# Patient Record
Sex: MALE | Race: BLACK OR AFRICAN AMERICAN | Employment: FULL TIME | URBAN - METROPOLITAN AREA
[De-identification: names, ages, dates, MRNs, and addresses within clinical notes are randomized per-mention and may not be internally consistent; named-entity substitution may affect disease eponyms.]

---

## 2017-01-05 ENCOUNTER — OFFICE VISIT (OUTPATIENT)
Dept: INTERNAL MEDICINE CLINIC | Age: 52
End: 2017-01-05

## 2017-01-05 VITALS
HEIGHT: 67 IN | WEIGHT: 263.8 LBS | BODY MASS INDEX: 41.4 KG/M2 | DIASTOLIC BLOOD PRESSURE: 82 MMHG | HEART RATE: 98 BPM | SYSTOLIC BLOOD PRESSURE: 132 MMHG | OXYGEN SATURATION: 98 % | TEMPERATURE: 99 F

## 2017-01-05 DIAGNOSIS — G47.9 SLEEP DISORDER: ICD-10-CM

## 2017-01-05 DIAGNOSIS — E11.9 TYPE 2 DIABETES MELLITUS WITHOUT COMPLICATION, WITHOUT LONG-TERM CURRENT USE OF INSULIN (HCC): Primary | ICD-10-CM

## 2017-01-05 DIAGNOSIS — I10 ESSENTIAL HYPERTENSION: ICD-10-CM

## 2017-01-05 DIAGNOSIS — E78.00 PURE HYPERCHOLESTEROLEMIA: ICD-10-CM

## 2017-01-05 LAB
ALBUMIN SERPL-MCNC: 4.3 G/DL (ref 3.4–5)
ANION GAP SERPL CALCULATED.3IONS-SCNC: 15 MMOL/L (ref 3–16)
BILIRUBIN URINE: NEGATIVE
BLOOD, URINE: NEGATIVE
BUN BLDV-MCNC: 13 MG/DL (ref 7–20)
CALCIUM SERPL-MCNC: 9.5 MG/DL (ref 8.3–10.6)
CHLORIDE BLD-SCNC: 98 MMOL/L (ref 99–110)
CLARITY: CLEAR
CO2: 23 MMOL/L (ref 21–32)
COLOR: YELLOW
CREAT SERPL-MCNC: 0.9 MG/DL (ref 0.9–1.3)
GFR AFRICAN AMERICAN: >60
GFR NON-AFRICAN AMERICAN: >60
GLUCOSE BLD-MCNC: 97 MG/DL (ref 70–99)
GLUCOSE URINE: >=1000 MG/DL
KETONES, URINE: NEGATIVE MG/DL
LEUKOCYTE ESTERASE, URINE: NEGATIVE
MICROSCOPIC EXAMINATION: ABNORMAL
NITRITE, URINE: NEGATIVE
PH UA: 6
PHOSPHORUS: 4.5 MG/DL (ref 2.5–4.9)
POTASSIUM SERPL-SCNC: 4.4 MMOL/L (ref 3.5–5.1)
PROTEIN UA: NEGATIVE MG/DL
SODIUM BLD-SCNC: 136 MMOL/L (ref 136–145)
SPECIFIC GRAVITY UA: >1.03
URINE TYPE: ABNORMAL
UROBILINOGEN, URINE: 0.2 E.U./DL

## 2017-01-05 PROCEDURE — 99214 OFFICE O/P EST MOD 30 MIN: CPT | Performed by: INTERNAL MEDICINE

## 2017-01-05 RX ORDER — AMLODIPINE BESYLATE 10 MG/1
10 TABLET ORAL DAILY
Qty: 30 TABLET | Refills: 5 | Status: SHIPPED | OUTPATIENT
Start: 2017-01-05 | End: 2017-09-10 | Stop reason: SDUPTHER

## 2017-01-05 RX ORDER — SIMVASTATIN 20 MG
20 TABLET ORAL EVERY EVENING
Qty: 30 TABLET | Refills: 5 | Status: SHIPPED | OUTPATIENT
Start: 2017-01-05 | End: 2020-04-09 | Stop reason: CLARIF

## 2017-01-05 RX ORDER — LOSARTAN POTASSIUM 100 MG/1
100 TABLET ORAL DAILY
Qty: 30 TABLET | Refills: 5 | Status: SHIPPED | OUTPATIENT
Start: 2017-01-05 | End: 2017-09-23 | Stop reason: SDUPTHER

## 2017-01-05 ASSESSMENT — ENCOUNTER SYMPTOMS
RESPIRATORY NEGATIVE: 1
GASTROINTESTINAL NEGATIVE: 1
EYES NEGATIVE: 1

## 2017-01-06 LAB
ESTIMATED AVERAGE GLUCOSE: 188.6 MG/DL
HBA1C MFR BLD: 8.2 %

## 2017-02-28 RX ORDER — CITALOPRAM 20 MG/1
TABLET ORAL
Qty: 30 TABLET | Refills: 2 | Status: SHIPPED | OUTPATIENT
Start: 2017-02-28 | End: 2021-01-08

## 2017-03-06 ENCOUNTER — OFFICE VISIT (OUTPATIENT)
Dept: INTERNAL MEDICINE CLINIC | Age: 52
End: 2017-03-06

## 2017-03-06 VITALS
BODY MASS INDEX: 42.38 KG/M2 | TEMPERATURE: 98.7 F | SYSTOLIC BLOOD PRESSURE: 112 MMHG | WEIGHT: 270 LBS | HEART RATE: 93 BPM | OXYGEN SATURATION: 98 % | HEIGHT: 67 IN | DIASTOLIC BLOOD PRESSURE: 70 MMHG

## 2017-03-06 DIAGNOSIS — E78.00 PURE HYPERCHOLESTEROLEMIA: ICD-10-CM

## 2017-03-06 DIAGNOSIS — I10 ESSENTIAL HYPERTENSION: Primary | ICD-10-CM

## 2017-03-06 DIAGNOSIS — E11.9 TYPE 2 DIABETES MELLITUS WITHOUT COMPLICATION, WITHOUT LONG-TERM CURRENT USE OF INSULIN (HCC): ICD-10-CM

## 2017-03-06 LAB
BILIRUBIN URINE: NEGATIVE
BLOOD, URINE: NEGATIVE
CLARITY: CLEAR
COLOR: YELLOW
GLUCOSE BLD-MCNC: 138 MG/DL
GLUCOSE URINE: >=1000 MG/DL
HBA1C MFR BLD: 7.4 %
KETONES, URINE: NEGATIVE MG/DL
LEUKOCYTE ESTERASE, URINE: NEGATIVE
MICROSCOPIC EXAMINATION: ABNORMAL
NITRITE, URINE: NEGATIVE
PH UA: 6.5
PROTEIN UA: NEGATIVE MG/DL
SPECIFIC GRAVITY UA: >1.03
URINE TYPE: ABNORMAL
UROBILINOGEN, URINE: 0.2 E.U./DL

## 2017-03-06 PROCEDURE — 99214 OFFICE O/P EST MOD 30 MIN: CPT | Performed by: INTERNAL MEDICINE

## 2017-03-06 PROCEDURE — 83036 HEMOGLOBIN GLYCOSYLATED A1C: CPT | Performed by: INTERNAL MEDICINE

## 2017-03-06 PROCEDURE — 82962 GLUCOSE BLOOD TEST: CPT | Performed by: INTERNAL MEDICINE

## 2017-03-06 ASSESSMENT — ENCOUNTER SYMPTOMS
GASTROINTESTINAL NEGATIVE: 1
EYES NEGATIVE: 1
RESPIRATORY NEGATIVE: 1

## 2017-03-07 LAB
A/G RATIO: 1.3 (ref 1.1–2.2)
ALBUMIN SERPL-MCNC: 4.4 G/DL (ref 3.4–5)
ALP BLD-CCNC: 121 U/L (ref 40–129)
ALT SERPL-CCNC: 29 U/L (ref 10–40)
ANION GAP SERPL CALCULATED.3IONS-SCNC: 16 MMOL/L (ref 3–16)
AST SERPL-CCNC: 21 U/L (ref 15–37)
BILIRUB SERPL-MCNC: 0.3 MG/DL (ref 0–1)
BUN BLDV-MCNC: 13 MG/DL (ref 7–20)
CALCIUM SERPL-MCNC: 9.1 MG/DL (ref 8.3–10.6)
CHLORIDE BLD-SCNC: 97 MMOL/L (ref 99–110)
CO2: 23 MMOL/L (ref 21–32)
CREAT SERPL-MCNC: 0.8 MG/DL (ref 0.9–1.3)
ESTIMATED AVERAGE GLUCOSE: 177.2 MG/DL
GFR AFRICAN AMERICAN: >60
GFR NON-AFRICAN AMERICAN: >60
GLOBULIN: 3.5 G/DL
GLUCOSE BLD-MCNC: 128 MG/DL (ref 70–99)
HBA1C MFR BLD: 7.8 %
POTASSIUM SERPL-SCNC: 4.5 MMOL/L (ref 3.5–5.1)
SODIUM BLD-SCNC: 136 MMOL/L (ref 136–145)
TOTAL PROTEIN: 7.9 G/DL (ref 6.4–8.2)

## 2017-05-04 ENCOUNTER — OFFICE VISIT (OUTPATIENT)
Dept: INTERNAL MEDICINE CLINIC | Age: 52
End: 2017-05-04

## 2017-05-04 VITALS
HEIGHT: 68 IN | OXYGEN SATURATION: 98 % | TEMPERATURE: 98.4 F | HEART RATE: 89 BPM | WEIGHT: 267.7 LBS | SYSTOLIC BLOOD PRESSURE: 140 MMHG | BODY MASS INDEX: 40.57 KG/M2 | DIASTOLIC BLOOD PRESSURE: 88 MMHG

## 2017-05-04 DIAGNOSIS — I10 ESSENTIAL HYPERTENSION: Primary | ICD-10-CM

## 2017-05-04 DIAGNOSIS — E11.9 TYPE 2 DIABETES MELLITUS WITHOUT COMPLICATION, WITHOUT LONG-TERM CURRENT USE OF INSULIN (HCC): ICD-10-CM

## 2017-05-04 DIAGNOSIS — E78.00 PURE HYPERCHOLESTEROLEMIA: ICD-10-CM

## 2017-05-04 LAB
BILIRUBIN URINE: NEGATIVE
BLOOD, URINE: NEGATIVE
CLARITY: CLEAR
COLOR: YELLOW
GLUCOSE BLD-MCNC: 108 MG/DL
GLUCOSE URINE: >=1000 MG/DL
KETONES, URINE: NEGATIVE MG/DL
LEUKOCYTE ESTERASE, URINE: NEGATIVE
MICROSCOPIC EXAMINATION: ABNORMAL
NITRITE, URINE: NEGATIVE
PH UA: 6
PROTEIN UA: NEGATIVE MG/DL
SPECIFIC GRAVITY UA: >1.03
URINE TYPE: ABNORMAL
UROBILINOGEN, URINE: 1 E.U./DL

## 2017-05-04 PROCEDURE — 99214 OFFICE O/P EST MOD 30 MIN: CPT | Performed by: INTERNAL MEDICINE

## 2017-05-04 PROCEDURE — 82962 GLUCOSE BLOOD TEST: CPT | Performed by: INTERNAL MEDICINE

## 2017-05-04 ASSESSMENT — PATIENT HEALTH QUESTIONNAIRE - PHQ9
SUM OF ALL RESPONSES TO PHQ9 QUESTIONS 1 & 2: 0
SUM OF ALL RESPONSES TO PHQ QUESTIONS 1-9: 0
1. LITTLE INTEREST OR PLEASURE IN DOING THINGS: 0
2. FEELING DOWN, DEPRESSED OR HOPELESS: 0

## 2017-05-05 LAB
ALBUMIN SERPL-MCNC: 4.6 G/DL (ref 3.4–5)
ANION GAP SERPL CALCULATED.3IONS-SCNC: 15 MMOL/L (ref 3–16)
BUN BLDV-MCNC: 11 MG/DL (ref 7–20)
CALCIUM SERPL-MCNC: 9.2 MG/DL (ref 8.3–10.6)
CHLORIDE BLD-SCNC: 99 MMOL/L (ref 99–110)
CO2: 25 MMOL/L (ref 21–32)
CREAT SERPL-MCNC: 0.9 MG/DL (ref 0.9–1.3)
ESTIMATED AVERAGE GLUCOSE: 180 MG/DL
GFR AFRICAN AMERICAN: >60
GFR NON-AFRICAN AMERICAN: >60
GLUCOSE BLD-MCNC: 80 MG/DL (ref 70–99)
HBA1C MFR BLD: 7.9 %
PHOSPHORUS: 4.1 MG/DL (ref 2.5–4.9)
POTASSIUM SERPL-SCNC: 4.4 MMOL/L (ref 3.5–5.1)
SODIUM BLD-SCNC: 139 MMOL/L (ref 136–145)

## 2017-08-14 ENCOUNTER — OFFICE VISIT (OUTPATIENT)
Dept: INTERNAL MEDICINE CLINIC | Age: 52
End: 2017-08-14

## 2017-08-14 VITALS
BODY MASS INDEX: 38.95 KG/M2 | DIASTOLIC BLOOD PRESSURE: 82 MMHG | HEART RATE: 81 BPM | OXYGEN SATURATION: 98 % | HEIGHT: 68 IN | WEIGHT: 257 LBS | SYSTOLIC BLOOD PRESSURE: 138 MMHG | TEMPERATURE: 98.6 F

## 2017-08-14 DIAGNOSIS — E11.9 TYPE 2 DIABETES MELLITUS WITHOUT COMPLICATION, WITHOUT LONG-TERM CURRENT USE OF INSULIN (HCC): Primary | ICD-10-CM

## 2017-08-14 DIAGNOSIS — J06.9 URTI (ACUTE UPPER RESPIRATORY INFECTION): ICD-10-CM

## 2017-08-14 DIAGNOSIS — I10 ESSENTIAL HYPERTENSION: ICD-10-CM

## 2017-08-14 DIAGNOSIS — E78.00 PURE HYPERCHOLESTEROLEMIA: ICD-10-CM

## 2017-08-14 LAB — GLUCOSE BLD-MCNC: 81 MG/DL

## 2017-08-14 PROCEDURE — 82962 GLUCOSE BLOOD TEST: CPT | Performed by: INTERNAL MEDICINE

## 2017-08-14 PROCEDURE — 99214 OFFICE O/P EST MOD 30 MIN: CPT | Performed by: INTERNAL MEDICINE

## 2017-08-14 RX ORDER — AZITHROMYCIN 250 MG/1
TABLET, FILM COATED ORAL
Qty: 1 PACKET | Refills: 0 | Status: SHIPPED | OUTPATIENT
Start: 2017-08-14 | End: 2017-08-24

## 2017-08-14 ASSESSMENT — PATIENT HEALTH QUESTIONNAIRE - PHQ9
2. FEELING DOWN, DEPRESSED OR HOPELESS: 0
1. LITTLE INTEREST OR PLEASURE IN DOING THINGS: 0
SUM OF ALL RESPONSES TO PHQ9 QUESTIONS 1 & 2: 0
SUM OF ALL RESPONSES TO PHQ QUESTIONS 1-9: 0

## 2017-08-14 ASSESSMENT — ENCOUNTER SYMPTOMS
RESPIRATORY NEGATIVE: 1
EYES NEGATIVE: 1
GASTROINTESTINAL NEGATIVE: 1

## 2017-08-15 LAB
ALBUMIN SERPL-MCNC: 4.6 G/DL (ref 3.4–5)
ANION GAP SERPL CALCULATED.3IONS-SCNC: 17 MMOL/L (ref 3–16)
BILIRUBIN URINE: NEGATIVE
BLOOD, URINE: ABNORMAL
BUN BLDV-MCNC: 10 MG/DL (ref 7–20)
CALCIUM SERPL-MCNC: 9.5 MG/DL (ref 8.3–10.6)
CHLORIDE BLD-SCNC: 97 MMOL/L (ref 99–110)
CLARITY: CLEAR
CO2: 25 MMOL/L (ref 21–32)
COLOR: YELLOW
CREAT SERPL-MCNC: 0.9 MG/DL (ref 0.9–1.3)
EPITHELIAL CELLS, UA: 0 /HPF (ref 0–5)
ESTIMATED AVERAGE GLUCOSE: 139.9 MG/DL
GFR AFRICAN AMERICAN: >60
GFR NON-AFRICAN AMERICAN: >60
GLUCOSE BLD-MCNC: 67 MG/DL (ref 70–99)
GLUCOSE URINE: >=1000 MG/DL
HBA1C MFR BLD: 6.5 %
HYALINE CASTS: 0 /LPF (ref 0–8)
KETONES, URINE: NEGATIVE MG/DL
LEUKOCYTE ESTERASE, URINE: NEGATIVE
MICROSCOPIC EXAMINATION: YES
NITRITE, URINE: NEGATIVE
PH UA: 6
PHOSPHORUS: 4.3 MG/DL (ref 2.5–4.9)
POTASSIUM SERPL-SCNC: 4.4 MMOL/L (ref 3.5–5.1)
PROTEIN UA: NEGATIVE MG/DL
RBC UA: 4 /HPF (ref 0–4)
SODIUM BLD-SCNC: 139 MMOL/L (ref 136–145)
SPECIFIC GRAVITY UA: 1.02
URINE TYPE: ABNORMAL
UROBILINOGEN, URINE: 0.2 E.U./DL
WBC UA: 1 /HPF (ref 0–5)

## 2017-09-13 RX ORDER — AMLODIPINE BESYLATE 10 MG/1
TABLET ORAL
Qty: 30 TABLET | Refills: 5 | Status: SHIPPED | OUTPATIENT
Start: 2017-09-13 | End: 2018-06-10 | Stop reason: SDUPTHER

## 2017-09-25 RX ORDER — LOSARTAN POTASSIUM 100 MG/1
TABLET ORAL
Qty: 30 TABLET | Refills: 5 | Status: SHIPPED | OUTPATIENT
Start: 2017-09-25 | End: 2017-10-20 | Stop reason: SDUPTHER

## 2017-10-17 RX ORDER — CANAGLIFLOZIN AND METFORMIN HYDROCHLORIDE 150; 500 MG/1; MG/1
TABLET, FILM COATED ORAL
Qty: 60 TABLET | Refills: 3 | Status: SHIPPED | OUTPATIENT
Start: 2017-10-17 | End: 2018-02-09 | Stop reason: SDUPTHER

## 2017-10-20 RX ORDER — LOSARTAN POTASSIUM 100 MG/1
TABLET ORAL
Qty: 90 TABLET | Refills: 1 | Status: SHIPPED | OUTPATIENT
Start: 2017-10-20 | End: 2018-07-24

## 2017-11-14 ENCOUNTER — OFFICE VISIT (OUTPATIENT)
Dept: INTERNAL MEDICINE CLINIC | Age: 52
End: 2017-11-14

## 2017-11-14 VITALS
HEART RATE: 96 BPM | HEIGHT: 68 IN | SYSTOLIC BLOOD PRESSURE: 104 MMHG | OXYGEN SATURATION: 97 % | DIASTOLIC BLOOD PRESSURE: 80 MMHG | BODY MASS INDEX: 40.32 KG/M2 | WEIGHT: 266 LBS

## 2017-11-14 DIAGNOSIS — E78.00 PURE HYPERCHOLESTEROLEMIA: ICD-10-CM

## 2017-11-14 DIAGNOSIS — R53.82 CHRONIC FATIGUE: ICD-10-CM

## 2017-11-14 DIAGNOSIS — I10 ESSENTIAL HYPERTENSION: ICD-10-CM

## 2017-11-14 DIAGNOSIS — R40.0 DAYTIME SLEEPINESS: ICD-10-CM

## 2017-11-14 DIAGNOSIS — Z83.3 FAMILY HISTORY OF DIABETES MELLITUS: ICD-10-CM

## 2017-11-14 DIAGNOSIS — E11.9 TYPE 2 DIABETES MELLITUS WITHOUT COMPLICATION, WITHOUT LONG-TERM CURRENT USE OF INSULIN (HCC): Primary | ICD-10-CM

## 2017-11-14 LAB
GLUCOSE BLD-MCNC: 105 MG/DL
HBA1C MFR BLD: 6.9 %

## 2017-11-14 PROCEDURE — 83036 HEMOGLOBIN GLYCOSYLATED A1C: CPT | Performed by: INTERNAL MEDICINE

## 2017-11-14 PROCEDURE — 82962 GLUCOSE BLOOD TEST: CPT | Performed by: INTERNAL MEDICINE

## 2017-11-14 PROCEDURE — 99214 OFFICE O/P EST MOD 30 MIN: CPT | Performed by: INTERNAL MEDICINE

## 2017-11-14 ASSESSMENT — ENCOUNTER SYMPTOMS
GASTROINTESTINAL NEGATIVE: 1
RESPIRATORY NEGATIVE: 1
EYES NEGATIVE: 1

## 2017-11-14 NOTE — PROGRESS NOTES
Subjective:      Patient ID: Rachael Finn is a 47 y.o. male. Here for diabetes, hypertension, hyperlipidemia. Takes meds daily        Review of Systems   Constitutional: Negative. HENT: Negative. Eyes: Negative. Respiratory: Negative. Cardiovascular: Negative. Gastrointestinal: Negative. Genitourinary: Negative. Musculoskeletal: Negative. Skin: Negative. Neurological: Negative. Psychiatric/Behavioral: Negative. Objective:   Physical Exam   Constitutional: He is oriented to person, place, and time. He appears well-developed and well-nourished. HENT:   Head: Normocephalic and atraumatic. Left Ear: External ear normal.   Eyes: Conjunctivae and EOM are normal. Pupils are equal, round, and reactive to light. Neck: Normal range of motion. Neck supple. No thyromegaly present. Cardiovascular: Normal rate, regular rhythm and normal heart sounds. No murmur heard. Pulmonary/Chest: Effort normal and breath sounds normal. No respiratory distress. He has no wheezes. He has no rales. Abdominal: Soft. Bowel sounds are normal.   Musculoskeletal: Normal range of motion. Neurological: He is alert and oriented to person, place, and time. Skin: Skin is warm and dry. Psychiatric: He has a normal mood and affect.        Assessment:     diabetes improved    Hypertension  Stable    Hyperlipidemia  On diet    sleep disorder persists    Daytime sleepiness   Plan:     continue meds    Refills    Needs sleep study    Refills    Return in 2 months

## 2018-01-22 ENCOUNTER — OFFICE VISIT (OUTPATIENT)
Dept: INTERNAL MEDICINE CLINIC | Age: 53
End: 2018-01-22

## 2018-01-22 VITALS
DIASTOLIC BLOOD PRESSURE: 86 MMHG | WEIGHT: 268 LBS | OXYGEN SATURATION: 98 % | HEIGHT: 68 IN | BODY MASS INDEX: 40.62 KG/M2 | SYSTOLIC BLOOD PRESSURE: 120 MMHG | HEART RATE: 90 BPM

## 2018-01-22 DIAGNOSIS — I10 ESSENTIAL HYPERTENSION: ICD-10-CM

## 2018-01-22 DIAGNOSIS — E78.00 PURE HYPERCHOLESTEROLEMIA: ICD-10-CM

## 2018-01-22 DIAGNOSIS — E11.9 TYPE 2 DIABETES MELLITUS WITHOUT COMPLICATION, WITHOUT LONG-TERM CURRENT USE OF INSULIN (HCC): Primary | ICD-10-CM

## 2018-01-22 LAB
ALBUMIN SERPL-MCNC: 4.6 G/DL (ref 3.4–5)
ANION GAP SERPL CALCULATED.3IONS-SCNC: 16 MMOL/L (ref 3–16)
BUN BLDV-MCNC: 14 MG/DL (ref 7–20)
CALCIUM SERPL-MCNC: 9.4 MG/DL (ref 8.3–10.6)
CHLORIDE BLD-SCNC: 98 MMOL/L (ref 99–110)
CO2: 22 MMOL/L (ref 21–32)
CREAT SERPL-MCNC: 0.9 MG/DL (ref 0.9–1.3)
EPITHELIAL CELLS, UA: 0 /HPF (ref 0–5)
GFR AFRICAN AMERICAN: >60
GFR NON-AFRICAN AMERICAN: >60
GLUCOSE BLD-MCNC: 101 MG/DL (ref 70–99)
GLUCOSE BLD-MCNC: 122 MG/DL
HYALINE CASTS: 0 /LPF (ref 0–8)
PHOSPHORUS: 4.2 MG/DL (ref 2.5–4.9)
POTASSIUM SERPL-SCNC: 4.5 MMOL/L (ref 3.5–5.1)
RBC UA: 3 /HPF (ref 0–4)
SODIUM BLD-SCNC: 136 MMOL/L (ref 136–145)
WBC UA: 0 /HPF (ref 0–5)

## 2018-01-22 PROCEDURE — 99214 OFFICE O/P EST MOD 30 MIN: CPT | Performed by: INTERNAL MEDICINE

## 2018-01-22 PROCEDURE — 82962 GLUCOSE BLOOD TEST: CPT | Performed by: INTERNAL MEDICINE

## 2018-01-22 ASSESSMENT — ENCOUNTER SYMPTOMS
GASTROINTESTINAL NEGATIVE: 1
EYES NEGATIVE: 1
RESPIRATORY NEGATIVE: 1

## 2018-01-23 LAB
ESTIMATED AVERAGE GLUCOSE: 168.6 MG/DL
HBA1C MFR BLD: 7.5 %

## 2018-02-13 RX ORDER — CANAGLIFLOZIN AND METFORMIN HYDROCHLORIDE 150; 500 MG/1; MG/1
TABLET, FILM COATED ORAL
Qty: 60 TABLET | Refills: 3 | Status: SHIPPED | OUTPATIENT
Start: 2018-02-13 | End: 2018-04-23 | Stop reason: SDUPTHER

## 2018-03-05 RX ORDER — LOSARTAN POTASSIUM 100 MG/1
TABLET ORAL
Qty: 30 TABLET | Refills: 5 | Status: SHIPPED | OUTPATIENT
Start: 2018-03-05 | End: 2018-09-28 | Stop reason: SDUPTHER

## 2018-04-17 ENCOUNTER — TELEPHONE (OUTPATIENT)
Dept: INTERNAL MEDICINE CLINIC | Age: 53
End: 2018-04-17

## 2018-04-23 ENCOUNTER — OFFICE VISIT (OUTPATIENT)
Dept: INTERNAL MEDICINE CLINIC | Age: 53
End: 2018-04-23

## 2018-04-23 VITALS
OXYGEN SATURATION: 98 % | WEIGHT: 262 LBS | DIASTOLIC BLOOD PRESSURE: 80 MMHG | HEIGHT: 68 IN | HEART RATE: 93 BPM | BODY MASS INDEX: 39.71 KG/M2 | SYSTOLIC BLOOD PRESSURE: 120 MMHG

## 2018-04-23 DIAGNOSIS — E11.9 TYPE 2 DIABETES MELLITUS WITHOUT COMPLICATION, WITHOUT LONG-TERM CURRENT USE OF INSULIN (HCC): Primary | ICD-10-CM

## 2018-04-23 DIAGNOSIS — Z13.220 SCREENING FOR HYPERLIPIDEMIA: ICD-10-CM

## 2018-04-23 DIAGNOSIS — M79.674 PAIN OF TOE OF RIGHT FOOT: ICD-10-CM

## 2018-04-23 DIAGNOSIS — E78.00 PURE HYPERCHOLESTEROLEMIA: ICD-10-CM

## 2018-04-23 DIAGNOSIS — I10 ESSENTIAL HYPERTENSION: ICD-10-CM

## 2018-04-23 LAB — GLUCOSE BLD-MCNC: 94 MG/DL

## 2018-04-23 PROCEDURE — 96372 THER/PROPH/DIAG INJ SC/IM: CPT | Performed by: INTERNAL MEDICINE

## 2018-04-23 PROCEDURE — 99214 OFFICE O/P EST MOD 30 MIN: CPT | Performed by: INTERNAL MEDICINE

## 2018-04-23 PROCEDURE — 82962 GLUCOSE BLOOD TEST: CPT | Performed by: INTERNAL MEDICINE

## 2018-04-23 RX ORDER — GABAPENTIN 300 MG/1
300 CAPSULE ORAL 3 TIMES DAILY
Qty: 90 CAPSULE | Refills: 3 | Status: SHIPPED | OUTPATIENT
Start: 2018-04-23 | End: 2018-06-27 | Stop reason: SDUPTHER

## 2018-04-23 ASSESSMENT — ENCOUNTER SYMPTOMS
EYES NEGATIVE: 1
GASTROINTESTINAL NEGATIVE: 1
RESPIRATORY NEGATIVE: 1

## 2018-04-24 LAB
ALBUMIN SERPL-MCNC: 4.5 G/DL (ref 3.4–5)
ANION GAP SERPL CALCULATED.3IONS-SCNC: 18 MMOL/L (ref 3–16)
BUN BLDV-MCNC: 15 MG/DL (ref 7–20)
CALCIUM SERPL-MCNC: 9.5 MG/DL (ref 8.3–10.6)
CHLORIDE BLD-SCNC: 99 MMOL/L (ref 99–110)
CHOLESTEROL, TOTAL: 216 MG/DL (ref 0–199)
CO2: 22 MMOL/L (ref 21–32)
CREAT SERPL-MCNC: 0.8 MG/DL (ref 0.9–1.3)
CREATININE URINE: 73.5 MG/DL (ref 39–259)
ESTIMATED AVERAGE GLUCOSE: 165.7 MG/DL
GFR AFRICAN AMERICAN: >60
GFR NON-AFRICAN AMERICAN: >60
GLUCOSE BLD-MCNC: 92 MG/DL (ref 70–99)
HBA1C MFR BLD: 7.4 %
HDLC SERPL-MCNC: 46 MG/DL (ref 40–60)
LDL CHOLESTEROL CALCULATED: 151 MG/DL
MICROALBUMIN UR-MCNC: <1.2 MG/DL
MICROALBUMIN/CREAT UR-RTO: NORMAL MG/G (ref 0–30)
PHOSPHORUS: 3.8 MG/DL (ref 2.5–4.9)
POTASSIUM SERPL-SCNC: 4.7 MMOL/L (ref 3.5–5.1)
SODIUM BLD-SCNC: 139 MMOL/L (ref 136–145)
TRIGL SERPL-MCNC: 94 MG/DL (ref 0–150)
URIC ACID, SERUM: 4.4 MG/DL (ref 3.5–7.2)
VLDLC SERPL CALC-MCNC: 19 MG/DL

## 2018-05-24 ENCOUNTER — TELEPHONE (OUTPATIENT)
Dept: INTERNAL MEDICINE CLINIC | Age: 53
End: 2018-05-24

## 2018-06-12 RX ORDER — AMLODIPINE BESYLATE 10 MG/1
TABLET ORAL
Qty: 30 TABLET | Refills: 5 | Status: SHIPPED | OUTPATIENT
Start: 2018-06-12 | End: 2018-12-22 | Stop reason: SDUPTHER

## 2018-06-28 RX ORDER — GABAPENTIN 300 MG/1
300 CAPSULE ORAL 3 TIMES DAILY
Qty: 90 CAPSULE | Refills: 5 | Status: SHIPPED | OUTPATIENT
Start: 2018-06-28 | End: 2018-09-18

## 2018-07-24 ENCOUNTER — OFFICE VISIT (OUTPATIENT)
Dept: INTERNAL MEDICINE CLINIC | Age: 53
End: 2018-07-24

## 2018-07-24 VITALS
DIASTOLIC BLOOD PRESSURE: 80 MMHG | WEIGHT: 260 LBS | HEART RATE: 103 BPM | SYSTOLIC BLOOD PRESSURE: 120 MMHG | BODY MASS INDEX: 39.4 KG/M2 | HEIGHT: 68 IN | OXYGEN SATURATION: 99 %

## 2018-07-24 DIAGNOSIS — E11.9 TYPE 2 DIABETES MELLITUS WITHOUT COMPLICATION, WITHOUT LONG-TERM CURRENT USE OF INSULIN (HCC): Primary | ICD-10-CM

## 2018-07-24 DIAGNOSIS — I10 ESSENTIAL HYPERTENSION: ICD-10-CM

## 2018-07-24 DIAGNOSIS — E78.00 PURE HYPERCHOLESTEROLEMIA: ICD-10-CM

## 2018-07-24 LAB — GLUCOSE BLD-MCNC: 225 MG/DL

## 2018-07-24 PROCEDURE — 99214 OFFICE O/P EST MOD 30 MIN: CPT | Performed by: INTERNAL MEDICINE

## 2018-07-24 PROCEDURE — 82962 GLUCOSE BLOOD TEST: CPT | Performed by: INTERNAL MEDICINE

## 2018-07-24 ASSESSMENT — PATIENT HEALTH QUESTIONNAIRE - PHQ9
SUM OF ALL RESPONSES TO PHQ QUESTIONS 1-9: 0
2. FEELING DOWN, DEPRESSED OR HOPELESS: 0
SUM OF ALL RESPONSES TO PHQ9 QUESTIONS 1 & 2: 0
1. LITTLE INTEREST OR PLEASURE IN DOING THINGS: 0

## 2018-07-24 ASSESSMENT — ENCOUNTER SYMPTOMS
RESPIRATORY NEGATIVE: 1
GASTROINTESTINAL NEGATIVE: 1
EYES NEGATIVE: 1

## 2018-07-24 NOTE — PROGRESS NOTES
Ear: External ear normal.   Eyes: Conjunctivae and EOM are normal. Pupils are equal, round, and reactive to light. Neck: Normal range of motion. Neck supple. No thyromegaly present. Cardiovascular: Normal rate, regular rhythm and normal heart sounds. No murmur heard. Pulmonary/Chest: Effort normal and breath sounds normal. No respiratory distress. He has no wheezes. He has no rales. Abdominal: Soft. Bowel sounds are normal.   Musculoskeletal: Normal range of motion. Neurological: He is alert and oriented to person, place, and time. Skin: Skin is warm and dry. Psychiatric: He has a normal mood and affect. Current Outpatient Prescriptions on File Prior to Visit   Medication Sig Dispense Refill    gabapentin (NEURONTIN) 300 MG capsule Take 1 capsule by mouth 3 times daily for 30 days. . 90 capsule 5    amLODIPine (NORVASC) 10 MG tablet TAKE 1 TABLET BY MOUTH EVERY DAY 30 tablet 5    canagliflozin-metformin HCl (INVOKAMET) 150-500 MG Take 1 tablet by mouth every morning (before breakfast) 60 tablet 3    losartan (COZAAR) 100 MG tablet TAKE 1 TABLET BY MOUTH DAILY 30 tablet 5    canagliflozin-metformin HCl (INVOKAMET) 150-500 MG Take 1 tablet by mouth 2 times daily (with meals) 60 tablet 3    canagliflozin-metformin HCl (INVOKAMET) 150-500 MG Take 1 tablet by mouth 2 times daily (with meals) 60 tablet 3    sitaGLIPtin-metFORMIN (JANUMET XR)  MG TB24 per extended release tablet Take 1 tablet by mouth daily 30 tablet 5    citalopram (CELEXA) 20 MG tablet TAKE 1 TABLET BY MOUTH DAILY 30 tablet 2    simvastatin (ZOCOR) 20 MG tablet Take 1 tablet by mouth every evening 30 tablet 5    canagliflozin-metformin HCl (INVOKAMET) 150-500 MG Take 1 tablet by mouth 2 times daily (with meals) 60 tablet 3     No current facility-administered medications on file prior to visit.         Assessment:      Diabetes  Stable     Hypertension  Stable    Hyperlipidemia  On diet      Plan:      Continue meds

## 2018-09-18 ENCOUNTER — OFFICE VISIT (OUTPATIENT)
Dept: INTERNAL MEDICINE CLINIC | Age: 53
End: 2018-09-18

## 2018-09-18 VITALS
WEIGHT: 261 LBS | DIASTOLIC BLOOD PRESSURE: 74 MMHG | HEART RATE: 101 BPM | OXYGEN SATURATION: 98 % | SYSTOLIC BLOOD PRESSURE: 120 MMHG | BODY MASS INDEX: 39.56 KG/M2 | HEIGHT: 68 IN

## 2018-09-18 DIAGNOSIS — E78.00 PURE HYPERCHOLESTEROLEMIA: ICD-10-CM

## 2018-09-18 DIAGNOSIS — I10 ESSENTIAL HYPERTENSION: ICD-10-CM

## 2018-09-18 DIAGNOSIS — Z12.5 PROSTATE CANCER SCREENING: ICD-10-CM

## 2018-09-18 DIAGNOSIS — E11.9 TYPE 2 DIABETES MELLITUS WITHOUT COMPLICATION, WITHOUT LONG-TERM CURRENT USE OF INSULIN (HCC): Primary | ICD-10-CM

## 2018-09-18 LAB
GLUCOSE BLD-MCNC: 113 MG/DL
HBA1C MFR BLD: 6.8 %

## 2018-09-18 PROCEDURE — 83036 HEMOGLOBIN GLYCOSYLATED A1C: CPT | Performed by: INTERNAL MEDICINE

## 2018-09-18 PROCEDURE — 99214 OFFICE O/P EST MOD 30 MIN: CPT | Performed by: INTERNAL MEDICINE

## 2018-09-18 PROCEDURE — 82962 GLUCOSE BLOOD TEST: CPT | Performed by: INTERNAL MEDICINE

## 2018-09-18 RX ORDER — GABAPENTIN 300 MG/1
300 CAPSULE ORAL NIGHTLY
COMMUNITY
Start: 2018-09-12 | End: 2020-09-29 | Stop reason: ALTCHOICE

## 2018-09-18 ASSESSMENT — PATIENT HEALTH QUESTIONNAIRE - PHQ9
1. LITTLE INTEREST OR PLEASURE IN DOING THINGS: 0
SUM OF ALL RESPONSES TO PHQ QUESTIONS 1-9: 0
SUM OF ALL RESPONSES TO PHQ9 QUESTIONS 1 & 2: 0
2. FEELING DOWN, DEPRESSED OR HOPELESS: 0
SUM OF ALL RESPONSES TO PHQ QUESTIONS 1-9: 0

## 2018-09-25 ASSESSMENT — ENCOUNTER SYMPTOMS
GASTROINTESTINAL NEGATIVE: 1
RESPIRATORY NEGATIVE: 1
EYES NEGATIVE: 1

## 2018-09-28 RX ORDER — LOSARTAN POTASSIUM 100 MG/1
TABLET ORAL
Qty: 30 TABLET | Refills: 5 | Status: SHIPPED | OUTPATIENT
Start: 2018-09-28 | End: 2019-04-10 | Stop reason: SDUPTHER

## 2018-10-10 RX ORDER — CANAGLIFLOZIN AND METFORMIN HYDROCHLORIDE 150; 500 MG/1; MG/1
TABLET, FILM COATED ORAL
Qty: 60 TABLET | Refills: 3 | Status: SHIPPED | OUTPATIENT
Start: 2018-10-10 | End: 2020-04-09 | Stop reason: CLARIF

## 2018-12-26 RX ORDER — AMLODIPINE BESYLATE 10 MG/1
TABLET ORAL
Qty: 30 TABLET | Refills: 5 | Status: SHIPPED | OUTPATIENT
Start: 2018-12-26 | End: 2019-06-20 | Stop reason: SDUPTHER

## 2019-04-10 RX ORDER — LOSARTAN POTASSIUM 100 MG/1
TABLET ORAL
Qty: 30 TABLET | Refills: 5 | Status: SHIPPED | OUTPATIENT
Start: 2019-04-10 | End: 2019-10-12 | Stop reason: SDUPTHER

## 2019-06-20 RX ORDER — AMLODIPINE BESYLATE 10 MG/1
TABLET ORAL
Qty: 30 TABLET | Refills: 4 | Status: SHIPPED | OUTPATIENT
Start: 2019-06-20 | End: 2019-09-16 | Stop reason: SDUPTHER

## 2019-06-20 NOTE — TELEPHONE ENCOUNTER
Last OV: 09/18/18 no upcoming  Last refill: metformin-01/25/19                   Amlodipine-12/26/18    meds pended    Requested Prescriptions     Pending Prescriptions Disp Refills    metFORMIN (GLUCOPHAGE) 500 MG tablet [Pharmacy Med Name: METFORMIN  MG TABLET] 60 tablet 3     Sig: TAKE 1 TABLET BY MOUTH TWICE A DAY WITH MEALS    amLODIPine (NORVASC) 10 MG tablet [Pharmacy Med Name: AMLODIPINE BESYLATE 10 MG TAB] 30 tablet 4     Sig: TAKE 1 TABLET BY MOUTH EVERY DAY

## 2019-09-17 RX ORDER — AMLODIPINE BESYLATE 10 MG/1
TABLET ORAL
Qty: 90 TABLET | Refills: 3 | Status: SHIPPED | OUTPATIENT
Start: 2019-09-17 | End: 2020-09-24

## 2019-10-15 RX ORDER — LOSARTAN POTASSIUM 100 MG/1
TABLET ORAL
Qty: 90 TABLET | Refills: 1 | Status: SHIPPED | OUTPATIENT
Start: 2019-10-15 | End: 2020-09-23

## 2020-03-31 ENCOUNTER — TELEPHONE (OUTPATIENT)
Dept: PRIMARY CARE CLINIC | Age: 55
End: 2020-03-31

## 2020-03-31 NOTE — TELEPHONE ENCOUNTER
I CALLED PT, HAD TO LEAVE A MESSAGE,  READ HIM DR CARTWRIGHT'S RESPONSE BELOW. OFFERED HIM OPTIONS.   WILL AWAIT HIS RETURN CALL

## 2020-04-09 ENCOUNTER — VIRTUAL VISIT (OUTPATIENT)
Dept: PRIMARY CARE CLINIC | Age: 55
End: 2020-04-09

## 2020-04-09 PROCEDURE — 99441 PR PHYS/QHP TELEPHONE EVALUATION 5-10 MIN: CPT | Performed by: FAMILY MEDICINE

## 2020-04-09 SDOH — HEALTH STABILITY: MENTAL HEALTH: HOW MANY STANDARD DRINKS CONTAINING ALCOHOL DO YOU HAVE ON A TYPICAL DAY?: 1 OR 2

## 2020-04-09 SDOH — HEALTH STABILITY: MENTAL HEALTH: HOW OFTEN DO YOU HAVE A DRINK CONTAINING ALCOHOL?: MONTHLY OR LESS

## 2020-04-25 DIAGNOSIS — E11.9 TYPE 2 DIABETES MELLITUS WITHOUT COMPLICATION, WITHOUT LONG-TERM CURRENT USE OF INSULIN (HCC): ICD-10-CM

## 2020-04-25 DIAGNOSIS — E78.00 PURE HYPERCHOLESTEROLEMIA: ICD-10-CM

## 2020-04-25 LAB
A/G RATIO: 1 (ref 1.1–2.2)
ALBUMIN SERPL-MCNC: 4.2 G/DL (ref 3.4–5)
ALP BLD-CCNC: 131 U/L (ref 40–129)
ALT SERPL-CCNC: 25 U/L (ref 10–40)
ANION GAP SERPL CALCULATED.3IONS-SCNC: 12 MMOL/L (ref 3–16)
AST SERPL-CCNC: 20 U/L (ref 15–37)
BASOPHILS ABSOLUTE: 0.1 K/UL (ref 0–0.2)
BASOPHILS RELATIVE PERCENT: 1.1 %
BILIRUB SERPL-MCNC: 0.4 MG/DL (ref 0–1)
BUN BLDV-MCNC: 9 MG/DL (ref 7–20)
CALCIUM SERPL-MCNC: 9.6 MG/DL (ref 8.3–10.6)
CHLORIDE BLD-SCNC: 99 MMOL/L (ref 99–110)
CHOLESTEROL, TOTAL: 195 MG/DL (ref 0–199)
CO2: 24 MMOL/L (ref 21–32)
CREAT SERPL-MCNC: 0.8 MG/DL (ref 0.9–1.3)
CREATININE URINE: 133.6 MG/DL (ref 39–259)
EOSINOPHILS ABSOLUTE: 0.1 K/UL (ref 0–0.6)
EOSINOPHILS RELATIVE PERCENT: 1.7 %
GFR AFRICAN AMERICAN: >60
GFR NON-AFRICAN AMERICAN: >60
GLOBULIN: 4.2 G/DL
GLUCOSE BLD-MCNC: 331 MG/DL (ref 70–99)
HCT VFR BLD CALC: 42.8 % (ref 40.5–52.5)
HDLC SERPL-MCNC: 46 MG/DL (ref 40–60)
HEMOGLOBIN: 13.8 G/DL (ref 13.5–17.5)
LDL CHOLESTEROL CALCULATED: 134 MG/DL
LYMPHOCYTES ABSOLUTE: 2.5 K/UL (ref 1–5.1)
LYMPHOCYTES RELATIVE PERCENT: 30.6 %
MCH RBC QN AUTO: 25.6 PG (ref 26–34)
MCHC RBC AUTO-ENTMCNC: 32.3 G/DL (ref 31–36)
MCV RBC AUTO: 79.3 FL (ref 80–100)
MICROALBUMIN UR-MCNC: 1.6 MG/DL
MICROALBUMIN/CREAT UR-RTO: 12 MG/G (ref 0–30)
MONOCYTES ABSOLUTE: 0.7 K/UL (ref 0–1.3)
MONOCYTES RELATIVE PERCENT: 9 %
NEUTROPHILS ABSOLUTE: 4.7 K/UL (ref 1.7–7.7)
NEUTROPHILS RELATIVE PERCENT: 57.6 %
PDW BLD-RTO: 14.8 % (ref 12.4–15.4)
PLATELET # BLD: 352 K/UL (ref 135–450)
PMV BLD AUTO: 9.9 FL (ref 5–10.5)
POTASSIUM SERPL-SCNC: 4.6 MMOL/L (ref 3.5–5.1)
RBC # BLD: 5.4 M/UL (ref 4.2–5.9)
SODIUM BLD-SCNC: 135 MMOL/L (ref 136–145)
TOTAL PROTEIN: 8.4 G/DL (ref 6.4–8.2)
TRIGL SERPL-MCNC: 73 MG/DL (ref 0–150)
VLDLC SERPL CALC-MCNC: 15 MG/DL
WBC # BLD: 8.1 K/UL (ref 4–11)

## 2020-04-26 LAB
ESTIMATED AVERAGE GLUCOSE: 292 MG/DL
HBA1C MFR BLD: 11.8 %

## 2020-04-28 RX ORDER — PRAVASTATIN SODIUM 20 MG
20 TABLET ORAL DAILY
Qty: 90 TABLET | Refills: 1 | Status: SHIPPED | OUTPATIENT
Start: 2020-04-28 | End: 2020-09-29

## 2020-04-28 RX ORDER — GLYBURIDE 5 MG/1
5 TABLET ORAL
Qty: 90 TABLET | Refills: 1 | Status: SHIPPED | OUTPATIENT
Start: 2020-04-28 | End: 2020-09-29

## 2020-04-28 NOTE — TELEPHONE ENCOUNTER
Patient wants to know if should keep taking the amlodipine and losartan with other medication. Please advise patient on the matter.

## 2020-05-14 ENCOUNTER — TELEPHONE (OUTPATIENT)
Dept: PRIMARY CARE CLINIC | Age: 55
End: 2020-05-14

## 2020-09-23 ENCOUNTER — NURSE TRIAGE (OUTPATIENT)
Dept: OTHER | Facility: CLINIC | Age: 55
End: 2020-09-23

## 2020-09-23 ENCOUNTER — TELEPHONE (OUTPATIENT)
Dept: PRIMARY CARE CLINIC | Age: 55
End: 2020-09-23

## 2020-09-23 RX ORDER — LOSARTAN POTASSIUM 100 MG/1
TABLET ORAL
Qty: 90 TABLET | Refills: 1 | Status: SHIPPED | OUTPATIENT
Start: 2020-09-23 | End: 2021-03-17

## 2020-09-23 NOTE — TELEPHONE ENCOUNTER
----- Message from Vy Farheen sent at 9/23/2020  9:32 AM EDT -----  Subject: Message to Provider    QUESTIONS  Information for Provider? Patient called about numbness   severe tingling   and right shoulder pains. When I tried to connect him to RN Triage the   call disconnected because he was working. He wants to be seen as soon as   possible.  ---------------------------------------------------------------------------  --------------  CALL BACK INFO  What is the best way for the office to contact you? OK to leave message on   voicemail  Preferred Call Back Phone Number? [phone]  ---------------------------------------------------------------------------  --------------  SCRIPT ANSWERS  Relationship to Patient?  Self

## 2020-09-23 NOTE — TELEPHONE ENCOUNTER
Reason for Disposition   Numbness in a leg or foot (i.e., loss of sensation)    Answer Assessment - Initial Assessment Questions  1. ONSET: \"When did the pain start? \"       Month ago  2. LOCATION: \"Where is the pain located? \"       Both legs and right big toe  3. PAIN: \"How bad is the pain? \"    (Scale 1-10; or mild, moderate, severe)    -  MILD (1-3): doesn't interfere with normal activities     -  MODERATE (4-7): interferes with normal activities (e.g., work or school) or awakens from sleep, limping     -  SEVERE (8-10): excruciating pain, unable to do any normal activities, unable to walk      moderate  4. WORK OR EXERCISE: \"Has there been any recent work or exercise that involved this part of the body? \"       no  5. CAUSE: \"What do you think is causing the leg pain? \"      Unsure, diabetic  6. OTHER SYMPTOMS: \"Do you have any other symptoms? \" (e.g., chest pain, back pain, breathing difficulty, swelling, rash, fever, numbness, weakness)      no  7. PREGNANCY: \"Is there any chance you are pregnant? \" \"When was your last menstrual period? \"      na    Protocols used: LEG PAIN-ADULT-[place]    Patient called pre-service center [facility] with red flag complaint. Brief description of triage: leg numbness, tingling when squatting. Triage indicates for patient to be seen today in office    Care advice provided, patient verbalizes understanding; denies any other questions or concerns; instructed to call back for any new or worsening symptoms. Writer provided warm transfer to [facility] for appointment scheduling. Please do not respond to the triage nurse through this encounter. Any subsequent communication should be directly with the patient.

## 2020-09-24 RX ORDER — AMLODIPINE BESYLATE 10 MG/1
TABLET ORAL
Qty: 90 TABLET | Refills: 3 | Status: SHIPPED | OUTPATIENT
Start: 2020-09-24 | End: 2021-06-14 | Stop reason: SDUPTHER

## 2020-09-29 ENCOUNTER — OFFICE VISIT (OUTPATIENT)
Dept: PRIMARY CARE CLINIC | Age: 55
End: 2020-09-29
Payer: COMMERCIAL

## 2020-09-29 VITALS
OXYGEN SATURATION: 99 % | WEIGHT: 253.8 LBS | BODY MASS INDEX: 39.83 KG/M2 | HEART RATE: 95 BPM | SYSTOLIC BLOOD PRESSURE: 124 MMHG | TEMPERATURE: 97.2 F | DIASTOLIC BLOOD PRESSURE: 70 MMHG | HEIGHT: 67 IN

## 2020-09-29 LAB — HBA1C MFR BLD: 8.7 %

## 2020-09-29 PROCEDURE — 83036 HEMOGLOBIN GLYCOSYLATED A1C: CPT | Performed by: FAMILY MEDICINE

## 2020-09-29 PROCEDURE — 99214 OFFICE O/P EST MOD 30 MIN: CPT | Performed by: FAMILY MEDICINE

## 2020-09-29 RX ORDER — PRAVASTATIN SODIUM 20 MG
20 TABLET ORAL EVERY OTHER DAY
Qty: 45 TABLET | Refills: 1 | Status: SHIPPED | OUTPATIENT
Start: 2020-09-29 | End: 2020-10-20

## 2020-09-29 RX ORDER — GLYBURIDE 5 MG/1
5 TABLET ORAL 2 TIMES DAILY WITH MEALS
Qty: 180 TABLET | Refills: 1 | Status: SHIPPED | OUTPATIENT
Start: 2020-09-29 | End: 2020-10-20

## 2020-09-29 RX ORDER — NAPROXEN 250 MG/1
250 TABLET ORAL 2 TIMES DAILY WITH MEALS
Qty: 60 TABLET | Refills: 1 | Status: SHIPPED | OUTPATIENT
Start: 2020-09-29 | End: 2020-12-18

## 2020-09-29 ASSESSMENT — ENCOUNTER SYMPTOMS
ABDOMINAL PAIN: 0
SHORTNESS OF BREATH: 0
DIARRHEA: 0
NAUSEA: 0
VOMITING: 0
COUGH: 0

## 2020-09-29 NOTE — PROGRESS NOTES
[address] [facility] [address] PRIMARY CARE  [address] [place] [address]  Dept: [phone]  Dept Fax: [phone]     9/29/2020      Nicoalysha Arleth   1965     Chief Complaint   Patient presents with    Numbness     right foot,  x 1-2 months       HPI  Pt comes in today for numbness right foot, primarily the big toe. States it comes and goes. At times, he has no feeling whatsoever. No wounds/sores. Started about a month ago. No injuries. No change since onset. Worse with walking, some pain associated. Improved with warm water soaks. Has some pain in both legs, no weakness. Has been on gabapentin in 2018, but states he no longer takes it. Is unsure of when he stopped it. States he is concerned the leg pains are related to the statin he is on. Started in July. Has only been seen once before for virtual visit in April. A1c at that time was 11%. Metformin was increased and glyburide was added. He reports compliance. Does not check BS. Has been working on diet, lost about 15-18 lbs. Also c/o right shoulder pain/stiffness. No injury. Started about a month ago. 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 Last colonoscopy done 10 years ago. Due for repeat. Prior to Visit Medications    Medication Sig Taking?  Authorizing Provider   amLODIPine (NORVASC) 10 MG tablet TAKE 1 TABLET BY MOUTH EVERY DAY Yes Tiffany García, DO   losartan (COZAAR) 100 MG tablet TAKE 1 TABLET BY MOUTH EVERY DAY Yes Beth Ross, DO   metFORMIN (GLUCOPHAGE) 1000 MG tablet Take 1 tablet by mouth 2 times daily (with meals) Yes Tiffany García, DO   pravastatin (PRAVACHOL) 20 MG tablet Take 1 tablet by mouth daily Yes Tiffany García, DO   glyBURIDE (DIABETA) 5 MG tablet Take 1 tablet by mouth daily (with breakfast) Yes Tiffany García, DO   alprostadil, Vasodilator, (CAVERJECT) 20 MCG SOLR intracavernosal injection 20 mcg by Intracavernosal route Yes Historical Provider, MD   citalopram (CELEXA) 20 MG tablet TAKE 1 TABLET BY MOUTH DAILY Yes FLAVIO Renteria MD        Past Medical History:   Diagnosis Date    Depression     Erectile dysfunction     HLD (hyperlipidemia)     Hypertension     [facility]         Social History     Tobacco Use    Smoking status: Never Smoker    Smokeless tobacco: Never Used   Substance Use Topics    Alcohol use: Yes     Frequency: Monthly or less     Drinks per session: 1 or 2     Binge frequency: Never    Drug use: No        Past Surgical History:   Procedure Laterality Date    MOUTH SURGERY          Allergies   Allergen Reactions    Fish-Derived Products Hives and Shortness Of Breath    Other Hives and Shortness Of Breath        Family History   Problem Relation Age of Onset    Heart Failure Mother     Heart Disease Father         Patient's past medical history, surgical history, family history, medications, and allergies  were all reviewed and updated as appropriate today. Review of Systems   Constitutional: Negative for chills, fever and unexpected weight change. Respiratory: Negative for cough and shortness of breath. Cardiovascular: Negative for chest pain, palpitations and leg swelling. Gastrointestinal: Negative for abdominal pain, diarrhea, nausea and vomiting. Musculoskeletal: Positive for arthralgias and myalgias. Neurological: Positive for numbness. Negative for dizziness, syncope, weakness and headaches. /70 (Cuff Size: Medium Adult)   Pulse 95   Temp 97.2 °F (36.2 °C) (Oral)   Ht 5' 7\" (1.702 m)   Wt 253 lb 12.8 oz (115.1 kg)   SpO2 99% Comment: room air  BMI 39.75 kg/m²      Physical Exam  Vitals signs reviewed. Constitutional:       General: He is not in acute distress. Appearance: He is well-developed. HENT:      Head: Normocephalic and atraumatic. Eyes:      Pupils: Pupils are equal, round, and reactive to light.    Neck: Musculoskeletal: Neck supple. Thyroid: No thyromegaly. Cardiovascular:      Rate and Rhythm: Normal rate and regular rhythm. Heart sounds: No murmur. Pulmonary:      Effort: Pulmonary effort is normal. No respiratory distress. Breath sounds: Normal breath sounds. Abdominal:      General: Bowel sounds are normal. There is no distension. Palpations: Abdomen is soft. Tenderness: There is no abdominal tenderness. Musculoskeletal:      Comments: Right shoulder: no deformity on exam, NTTP, + globally reduced ROM ~ 50%   Feet:      Comments: BL DM foot exam: Diminished sensation to right hallux, otherwise sensation normal bilaterally, pulses 2+, nails normal in appearance, no wounds, + dry skin without skin breakdown or significant callus  Lymphadenopathy:      Cervical: No cervical adenopathy. Skin:     General: Skin is warm and dry. Capillary Refill: Capillary refill takes less than 2 seconds. Neurological:      Mental Status: He is alert and oriented to person, place, and time. Assessment:  Encounter Diagnoses   Name Primary?  Type 2 diabetes mellitus without complication, without long-term current use of insulin (HCC) Yes    Essential hypertension     Pure hypercholesterolemia     Myalgia due to statin     Adhesive capsulitis of right shoulder     Screening for colon cancer        Plan:    - A1c improved to 8.7% from >11%. Doing much better with this. I suspect the numbness in right foot is 2/2 diabetic neuropathy. Declines going back on the gabapentin. Will continue to work on sugar control. Glyburide increased to BID.   - Decreased dose of pravastatin to QOD. Monitor response with respect to the myalgias. - Right shoulder exercises provided. Start naproxen BID with meals. PT referral if no improvement. - Colonoscopy referral.   - Flu vaccine declined.    - Encouraged to schedule eye exam.     New Prescriptions    NAPROXEN (NAPROSYN) 250 MG TABLET    Take 1 tablet by mouth 2 times daily (with meals)        Orders Placed This Encounter   Procedures   Karel Glass MD, Gastroenterology, [facility] DIABETES FOOT EXAM        Return in about 3 months (around 12/29/2020) for Follow up DMII.           [address], DO

## 2020-09-29 NOTE — LETTER
[address] [address]  Phone: [phone]  Fax: [phone] [place], [place]        September 29, 2020     Patient: Veriot Lynn   YOB: 1965   Date of Visit: 9/29/2020       To Whom It May Concern:      Verito Lynn was in my office today for his medical appointment. If you have any questions or concerns, please don't hesitate to call.     Sincerely,        [address], DO

## 2020-10-20 RX ORDER — PRAVASTATIN SODIUM 20 MG
TABLET ORAL
Qty: 30 TABLET | Refills: 5 | Status: SHIPPED | OUTPATIENT
Start: 2020-10-20 | End: 2021-03-02 | Stop reason: DRUGHIGH

## 2020-10-20 RX ORDER — GLYBURIDE 5 MG/1
TABLET ORAL
Qty: 30 TABLET | Refills: 5 | Status: SHIPPED | OUTPATIENT
Start: 2020-10-20 | End: 2021-02-04

## 2020-12-18 RX ORDER — NAPROXEN 250 MG/1
TABLET ORAL
Qty: 60 TABLET | Refills: 1 | Status: SHIPPED | OUTPATIENT
Start: 2020-12-18 | End: 2021-02-15

## 2021-01-08 ENCOUNTER — OFFICE VISIT (OUTPATIENT)
Dept: PRIMARY CARE CLINIC | Age: 56
End: 2021-01-08
Payer: COMMERCIAL

## 2021-01-08 VITALS
OXYGEN SATURATION: 99 % | DIASTOLIC BLOOD PRESSURE: 72 MMHG | BODY MASS INDEX: 40.12 KG/M2 | WEIGHT: 255.6 LBS | TEMPERATURE: 97.3 F | SYSTOLIC BLOOD PRESSURE: 138 MMHG | HEIGHT: 67 IN | HEART RATE: 103 BPM

## 2021-01-08 DIAGNOSIS — Z13.31 POSITIVE DEPRESSION SCREENING: ICD-10-CM

## 2021-01-08 DIAGNOSIS — I10 ESSENTIAL HYPERTENSION: ICD-10-CM

## 2021-01-08 DIAGNOSIS — F32.A DEPRESSIVE DISORDER: ICD-10-CM

## 2021-01-08 DIAGNOSIS — E11.9 TYPE 2 DIABETES MELLITUS WITHOUT COMPLICATION, WITHOUT LONG-TERM CURRENT USE OF INSULIN (HCC): Primary | ICD-10-CM

## 2021-01-08 DIAGNOSIS — Z12.11 SCREENING FOR COLON CANCER: ICD-10-CM

## 2021-01-08 DIAGNOSIS — E78.00 PURE HYPERCHOLESTEROLEMIA: ICD-10-CM

## 2021-01-08 DIAGNOSIS — G89.29 CHRONIC RIGHT SHOULDER PAIN: ICD-10-CM

## 2021-01-08 DIAGNOSIS — M25.511 CHRONIC RIGHT SHOULDER PAIN: ICD-10-CM

## 2021-01-08 DIAGNOSIS — L73.9 FOLLICULITIS OF RIGHT AXILLA: ICD-10-CM

## 2021-01-08 PROCEDURE — G8431 POS CLIN DEPRES SCRN F/U DOC: HCPCS | Performed by: FAMILY MEDICINE

## 2021-01-08 PROCEDURE — 99214 OFFICE O/P EST MOD 30 MIN: CPT | Performed by: FAMILY MEDICINE

## 2021-01-08 RX ORDER — CITALOPRAM 10 MG/1
TABLET ORAL
Qty: 30 TABLET | Refills: 3 | Status: SHIPPED | OUTPATIENT
Start: 2021-01-08 | End: 2021-04-27

## 2021-01-08 ASSESSMENT — PATIENT HEALTH QUESTIONNAIRE - PHQ9
5. POOR APPETITE OR OVEREATING: 0
SUM OF ALL RESPONSES TO PHQ9 QUESTIONS 1 & 2: 5
6. FEELING BAD ABOUT YOURSELF - OR THAT YOU ARE A FAILURE OR HAVE LET YOURSELF OR YOUR FAMILY DOWN: 1
SUM OF ALL RESPONSES TO PHQ QUESTIONS 1-9: 12
7. TROUBLE CONCENTRATING ON THINGS, SUCH AS READING THE NEWSPAPER OR WATCHING TELEVISION: 0
3. TROUBLE FALLING OR STAYING ASLEEP: 3
4. FEELING TIRED OR HAVING LITTLE ENERGY: 3
9. THOUGHTS THAT YOU WOULD BE BETTER OFF DEAD, OR OF HURTING YOURSELF: 0

## 2021-01-08 ASSESSMENT — COLUMBIA-SUICIDE SEVERITY RATING SCALE - C-SSRS
1. WITHIN THE PAST MONTH, HAVE YOU WISHED YOU WERE DEAD OR WISHED YOU COULD GO TO SLEEP AND NOT WAKE UP?: NO
6. HAVE YOU EVER DONE ANYTHING, STARTED TO DO ANYTHING, OR PREPARED TO DO ANYTHING TO END YOUR LIFE?: NO

## 2021-01-08 NOTE — PROGRESS NOTES
[address] [facility] [address] PRIMARY CARE  [address] [place] [address]  Dept: [phone]  Dept Fax: [phone]     1/8/2021      Mari Gasca   1965     Chief Complaint   Patient presents with    Diabetes     lumps under arms       HPI  Pt comes in today for follow up DMII, HTN, HLD. Last a1c = 8.7% which was improved. He is currently on metformin BID and glyburide 5mg daily. Does not check home BS. Following regular diet, trying to watch sugar/carb intake. Statin decreased to QOD after last visit. He has done well with this. Some neuropathy symptoms, but states this is improved. Declines flu vaccine. Colonoscopy has been delayed due to COVID-19 at [address] States he hasn't even been able to get thru to them. Last one done at age 38. H/o depression. States this is long standing. Was previously on citalopram with Dr. Rachna Curtis. States he had told him to take it PRN. He is not currently on it. Has had a lot of stress at work. Would like to start back on this. No suicidal thoughts. Reports right shoulder is still bothering him. Limited mobility/ROM. Difficulty with lifting boxes. Still very stiff. Has not had any lifting. C/o bumps under right armpit. Possible happened after shaving. May have nicked the area. PHQ-9 Total Score: 12 (1/8/2021 10:56 AM)  Thoughts that you would be better off dead, or of hurting yourself in some way: 0 (1/8/2021 10:56 AM)       Prior to Visit Medications    Medication Sig Taking?  Authorizing Provider   naproxen (NAPROSYN) 250 MG tablet TAKE 1 TABLET BY MOUTH TWICE A DAY WITH MEALS Yes Tiffany García, DO   glyBURIDE (DIABETA) 5 MG tablet TAKE 1 TABLET BY MOUTH EVERY DAY WITH BREAKFAST Yes Tiffany García, DO   metFORMIN (GLUCOPHAGE) 1000 MG tablet TAKE 1 TABLET BY MOUTH TWICE A DAY WITH MEALS Yes Tiffany García, DO   pravastatin (PRAVACHOL) 20 MG tablet TAKE 1 TABLET BY MOUTH Joel Mcgill 238, DO   amLODIPine (NORVASC) 10 MG tablet TAKE 1 TABLET BY MOUTH EVERY DAY Yes Tiffany García,    losartan (COZAAR) 100 MG tablet TAKE 1 TABLET BY MOUTH EVERY DAY Yes Tiffany García DO   alprostadil, Vasodilator, (CAVERJECT) 20 MCG SOLR intracavernosal injection 20 mcg by Intracavernosal route Yes Historical Provider, MD   citalopram (CELEXA) 20 MG tablet TAKE 1 TABLET BY MOUTH DAILY  Patient not taking: Reported on 1/8/2021  FLAVIO Hardy MD        Past Medical History:   Diagnosis Date    Depression     Erectile dysfunction     HLD (hyperlipidemia)     Hypertension     Type II diabetes mellitus ([facility]         Social History     Tobacco Use    Smoking status: Never Smoker    Smokeless tobacco: Never Used   Substance Use Topics    Alcohol use: Yes     Frequency: Monthly or less     Drinks per session: 1 or 2     Binge frequency: Never    Drug use: No        Past Surgical History:   Procedure Laterality Date    MOUTH SURGERY          Allergies   Allergen Reactions    Fish-Derived Products Hives and Shortness Of Breath    Other Hives and Shortness Of Breath        Family History   Problem Relation Age of Onset    Heart Failure Mother     Heart Disease Father         Patient's past medical history, surgical history, family history, medications, and allergies  were all reviewed and updated as appropriate today. Review of Systems   Constitutional: Negative for fever. Respiratory: Negative for cough. Cardiovascular: Negative for chest pain. Musculoskeletal: Positive for arthralgias. Skin: Positive for rash. Neurological: Negative for dizziness, syncope and weakness. Psychiatric/Behavioral: Positive for dysphoric mood. Negative for sleep disturbance. The patient is not nervous/anxious.         /72   Pulse 103   Temp 97.3 °F (36.3 °C) (Temporal)   Ht 5' 7\" (1.702 m)   Wt 255 lb 9.6 oz (115.9 kg)   SpO2 99% Comment: room air BMI 40.03 kg/m²      Physical Exam  Vitals signs reviewed. Constitutional:       General: He is not in acute distress. Appearance: He is well-developed. HENT:      Head: Normocephalic and atraumatic. Right Ear: Tympanic membrane normal.      Left Ear: Tympanic membrane normal.   Eyes:      General: No scleral icterus. Conjunctiva/sclera: Conjunctivae normal.      Pupils: Pupils are equal, round, and reactive to light. Neck:      Musculoskeletal: Neck supple. Thyroid: No thyromegaly. Cardiovascular:      Rate and Rhythm: Normal rate and regular rhythm. Heart sounds: No murmur. Pulmonary:      Effort: Pulmonary effort is normal. No respiratory distress. Breath sounds: Normal breath sounds. Abdominal:      General: Bowel sounds are normal. There is no distension. Palpations: Abdomen is soft. Tenderness: There is no abdominal tenderness. Musculoskeletal:      Comments: Right shoulder: no deformity on exam, NTTP, + globally reduced ROM ~ 50%   Feet:      Comments: BL DM foot exam: Diminished sensation to right hallux, otherwise sensation normal bilaterally, pulses 2+, nails normal in appearance, no wounds, + dry skin without skin breakdown or significant callus  Lymphadenopathy:      Cervical: No cervical adenopathy. Skin:     General: Skin is warm and dry. Capillary Refill: Capillary refill takes less than 2 seconds. Comments: Right axilla with 2-3 small, raised, erythematous bumps, minimal erythema, no fluctuance   Neurological:      General: No focal deficit present. Mental Status: He is alert and oriented to person, place, and time. Psychiatric:         Mood and Affect: Mood normal.         Assessment:  Encounter Diagnoses   Name Primary?     Type 2 diabetes mellitus without complication, without long-term current use of insulin (HCC) Yes    Essential hypertension     Depressive disorder     Pure hypercholesterolemia     Chronic right shoulder pain     Folliculitis of right axilla     Positive depression screening     Screening for colon cancer        Plan:    - Fasting labs. - New referral for colonoscopy. - XR right shoulder.   - Warm compresses to right axilla. Rx keflex. - Start back on citalopram.     New Prescriptions    No medications on file   These medications were prescribed by a provider not a part of this encounter    CEPHALEXIN (KEFLEX) 500 MG CAPSULE    Take 1 capsule by mouth 2 times daily for 7 days        Orders Placed This Encounter   Procedures    XR SHOULDER RIGHT (MIN 2 VIEWS)    HEMOGLOBIN A1C    BASIC METABOLIC PANEL    LIPID PANEL    MICROALBUMIN / Michael Henning MD (Colonoscopy), General Surgery, [facility]    Positive Screen for Clinical Depression with a Documented Follow-up Plan         Return in about 6 months (around 7/8/2021) for Physical - non-fasting.           [address], DO

## 2021-01-08 NOTE — LETTER
[address] [place] [address]  Phone: [phone]  Fax: [phone]    Janny Montanez DO        January 8, 2021     Patient: Jessica Gaston   YOB: 1965   Date of Visit: 1/8/2021       To Whom It May Concern:    Silvia Blas was in my office today, (1/8/21) for his medical appointment. If you have any questions or concerns, please don't hesitate to call.     Sincerely,        DO Mik

## 2021-01-11 ENCOUNTER — TELEPHONE (OUTPATIENT)
Dept: PRIMARY CARE CLINIC | Age: 56
End: 2021-01-11

## 2021-01-11 RX ORDER — CEPHALEXIN 500 MG/1
500 CAPSULE ORAL 2 TIMES DAILY
Qty: 14 CAPSULE | Refills: 0 | Status: SHIPPED | OUTPATIENT
Start: 2021-01-11 | End: 2021-01-18

## 2021-01-11 ASSESSMENT — ENCOUNTER SYMPTOMS: COUGH: 0

## 2021-01-11 NOTE — TELEPHONE ENCOUNTER
----- Message from Beth Guzman sent at 1/11/2021  1:42 PM EST -----  Subject: Message to Provider    QUESTIONS  Information for Provider? Patient saw Dr. Roderick Glez on January 8 and was   prescribed an antibiotic for an underarm problem he is having. The   pharmacy   [facility] [phone] says it wasn't called in to them. Please advise.  ---------------------------------------------------------------------------  --------------  CALL BACK INFO  What is the best way for the office to contact you? OK to leave message on   voicemail  Preferred Call Back Phone Number? [phone]  ---------------------------------------------------------------------------  --------------  SCRIPT ANSWERS  Relationship to Patient?  Self

## 2021-01-29 ENCOUNTER — HOSPITAL ENCOUNTER (OUTPATIENT)
Age: 56
Discharge: HOME | End: 2021-01-29
Payer: COMMERCIAL

## 2021-01-29 ENCOUNTER — HOSPITAL ENCOUNTER (OUTPATIENT)
Dept: GENERAL RADIOLOGY | Age: 56
Discharge: HOME | End: 2021-01-29
Payer: COMMERCIAL

## 2021-01-29 DIAGNOSIS — E78.00 PURE HYPERCHOLESTEROLEMIA: ICD-10-CM

## 2021-01-29 DIAGNOSIS — M25.511 CHRONIC RIGHT SHOULDER PAIN: ICD-10-CM

## 2021-01-29 DIAGNOSIS — E11.9 TYPE 2 DIABETES MELLITUS WITHOUT COMPLICATION, WITHOUT LONG-TERM CURRENT USE OF INSULIN (HCC): ICD-10-CM

## 2021-01-29 DIAGNOSIS — G89.29 CHRONIC RIGHT SHOULDER PAIN: ICD-10-CM

## 2021-01-29 PROCEDURE — 73030 X-RAY EXAM OF SHOULDER: CPT

## 2021-01-30 LAB
ANION GAP SERPL CALCULATED.3IONS-SCNC: 12 MMOL/L (ref 3–16)
BUN BLDV-MCNC: 10 MG/DL (ref 7–20)
CALCIUM SERPL-MCNC: 9.8 MG/DL (ref 8.3–10.6)
CHLORIDE BLD-SCNC: 97 MMOL/L (ref 99–110)
CHOLESTEROL, TOTAL: 156 MG/DL (ref 0–199)
CO2: 27 MMOL/L (ref 21–32)
CREAT SERPL-MCNC: 0.9 MG/DL (ref 0.9–1.3)
CREATININE URINE: 199 MG/DL (ref 39–259)
ESTIMATED AVERAGE GLUCOSE: 292 MG/DL
GFR AFRICAN AMERICAN: >60
GFR NON-AFRICAN AMERICAN: >60
GLUCOSE BLD-MCNC: 251 MG/DL (ref 70–99)
HBA1C MFR BLD: 11.8 %
HDLC SERPL-MCNC: 44 MG/DL (ref 40–60)
LDL CHOLESTEROL CALCULATED: 95 MG/DL
MICROALBUMIN UR-MCNC: 1.3 MG/DL
MICROALBUMIN/CREAT UR-RTO: 6.5 MG/G (ref 0–30)
POTASSIUM SERPL-SCNC: 4.6 MMOL/L (ref 3.5–5.1)
SODIUM BLD-SCNC: 136 MMOL/L (ref 136–145)
TRIGL SERPL-MCNC: 84 MG/DL (ref 0–150)
VLDLC SERPL CALC-MCNC: 17 MG/DL

## 2021-02-04 ENCOUNTER — TELEPHONE (OUTPATIENT)
Dept: PHARMACY | Age: 56
End: 2021-02-04

## 2021-02-04 ENCOUNTER — VIRTUAL VISIT (OUTPATIENT)
Dept: PRIMARY CARE CLINIC | Age: 56
End: 2021-02-04
Payer: COMMERCIAL

## 2021-02-04 DIAGNOSIS — E11.9 TYPE 2 DIABETES MELLITUS WITHOUT COMPLICATION, WITHOUT LONG-TERM CURRENT USE OF INSULIN (HCC): Primary | ICD-10-CM

## 2021-02-04 PROCEDURE — 99213 OFFICE O/P EST LOW 20 MIN: CPT | Performed by: FAMILY MEDICINE

## 2021-02-04 RX ORDER — GLYBURIDE 5 MG/1
TABLET ORAL
Qty: 180 TABLET | Refills: 1 | Status: SHIPPED | OUTPATIENT
Start: 2021-02-04 | End: 2021-05-19 | Stop reason: CLARIF

## 2021-02-04 ASSESSMENT — ENCOUNTER SYMPTOMS: SHORTNESS OF BREATH: 0

## 2021-02-04 NOTE — TELEPHONE ENCOUNTER
[facility] from Dr. George Espinal for diabetes. Call placed to patient to schedule initial visit. Left message with instructions for patient to call the clinic back.      Marisol Mar, JimD, [facility]  Ph: [phone]

## 2021-02-04 NOTE — PROGRESS NOTES
[address] PRIMARY CARE  [address]   [address] [facility] [address]  Dept: [phone]  Dept Fax: [phone]     2/4/2021      Radha Jones   1965     Chief Complaint   Patient presents with    Diabetes Mellitus       HPI    Pt encounter today completed virtual visit using doxy. me platform. Services were provided through a video synchronous discussion virtually to substitute for in-person clinic visit. Patient and provider were located at their individual homes. Patient seen today to discuss recent lab results. H/o T2DM, HTN, HLD, depression. A1c increased from 8.7 to 11.8% since September. He denies any recent changes in his diet. He is very compliant with current medications, denies missed doses. Taking Metformin 1000mg BID and Glyburide 5mg QD. Previously under the care of Dr. Joaquin Castillo, who had prescribed janumet, invokanamet and trulicity at various times, but reports financial barriers with these (although not sure exactly which ones were not covered). Prior to Visit Medications    Medication Sig Taking?  Authorizing Provider   citalopram (CELEXA) 10 MG tablet TAKE 1 TABLET BY MOUTH DAILY Yes Tiffany García DO   naproxen (NAPROSYN) 250 MG tablet TAKE 1 TABLET BY MOUTH TWICE A DAY WITH MEALS Yes Tiffany García DO   glyBURIDE (DIABETA) 5 MG tablet TAKE 1 TABLET BY MOUTH EVERY DAY WITH BREAKFAST Yes Tiffany García DO   metFORMIN (GLUCOPHAGE) 1000 MG tablet TAKE 1 TABLET BY MOUTH TWICE A DAY WITH MEALS Yes Tiffany Garcaí DO   pravastatin (PRAVACHOL) 20 MG tablet TAKE 1 TABLET BY MOUTH EVERY DAY Yes Tiffany García DO   amLODIPine (NORVASC) 10 MG tablet TAKE 1 TABLET BY MOUTH EVERY DAY Yes Tiffany García DO   losartan (COZAAR) 100 MG tablet TAKE 1 TABLET BY MOUTH EVERY DAY Yes Prakash Joya, DO alprostadil, Vasodilator, (CAVERJECT) 20 MCG SOLR intracavernosal injection 20 mcg by Intracavernosal route Yes Historical Provider, MD       Past Medical History:   Diagnosis Date    Depression     Erectile dysfunction     HLD (hyperlipidemia)     Hypertension     Type II diabetes mellitus ([facility] [address]         Social History     Tobacco Use    Smoking status: Never Smoker    Smokeless tobacco: Never Used   Substance Use Topics    Alcohol use: Yes     Frequency: Monthly or less     Drinks per session: 1 or 2     Binge frequency: Never    Drug use: No        Past Surgical History:   Procedure Laterality Date    MOUTH SURGERY          Allergies   Allergen Reactions    Fish-Derived Products Hives and Shortness Of Breath    Other Hives and Shortness Of Breath        Family History   Problem Relation Age of Onset    Heart Failure Mother     Heart Disease Father         Patient's past medical history, surgical history, family history, medications, and allergies  were all reviewed and updated as appropriate today. Review of Systems   Constitutional: Negative for fever. Respiratory: Negative for shortness of breath. Cardiovascular: Negative for chest pain. Vitals unable to obtain and physical exam is limited 2/2 virtual visit nature of this encounter. Physical Exam  Vitals signs reviewed. Constitutional:       General: He is not in acute distress. Appearance: Normal appearance. He is not ill-appearing. Eyes:      Conjunctiva/sclera: Conjunctivae normal.   Neck:      Musculoskeletal: Neck supple. Pulmonary:      Effort: Pulmonary effort is normal.   Neurological:      Mental Status: He is alert. Psychiatric:         Mood and Affect: Mood normal.         Assessment:  Encounter Diagnosis   Name Primary?     Type 2 diabetes mellitus without complication, without long-term current use of insulin ([facility] [address] Yes       Plan: - DMII - uncontrolled. We discussed some dietary modifications, including stopping his morning hot chocolate habit. He is motivated to work on his diet and get his numbers under better control. I have increased his glyburide to BID and sent referral to Eluterio Pulse PharmD to assist with med mgmt and diabetic education. Would like to add back GLP-1 or SGLT2 inhibitor - Will coordinate with Med Mgmt once established. New Prescriptions    No medications on file        Orders Placed This Encounter   Procedures   [address]  Medication Mgmt (Diabetes - Clinical Pharmacy) - [facility]        Return in about 3 months (around 5/4/2021). 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 A caregiver was present when appropriate. Due to this being a TeleHealth encounter (During [facility]- public health emergency), evaluation of the following organ systems was limited: Vitals/Constitutional/EENT/Resp/CV/GI//MS/Neuro/Skin/Heme-Lymph-Imm. Pursuant to the emergency declaration under the [address], [address] [place] [address] authority and the [employer] Act, this Virtual Visit was conducted with patient's (and/or legal guardian's) consent, to reduce the patient's risk of exposure to COVID-19 and provide necessary medical care. The patient (and/or legal guardian) has also been advised to contact this office for worsening conditions or problems, and seek emergency medical treatment and/or call 911 if deemed necessary.

## 2021-02-04 NOTE — TELEPHONE ENCOUNTER
----- Message from [facility], DO sent at 2/4/2021  8:48 AM EST -----  Regarding: Patient referral  Good morning! I recently saw Mr. Nathan Live for diabetes follow up. In September, his a1c was 8.7%. He felt that he could likely get this down with dietary modifications but unfortunately his a1c is now 11.8%. He is very compliant with her current medications but has some significant room for improvement in his diet. He is motivated to make the necessary changes to get this down, which is great! He is currently tolerating Metformin 1000mg BID and Glyburide 5mg daily. He was a previous patient of Dr. Chelsea Julian that I inherited last year. It looks like he has been tried on invokanamet, trulicity and janumet at various times. States he typically has issues with insurance authorizing these. For today, I recommended he increase his glyburide 5mg to BID and he agrees to cut out his morning hot chocolate habit :)    I placed a referral to you so you can hopefully assist with coordinating some additional med mgmt and diet education. Let me know if you have any questions!     Teo Alvarez

## 2021-02-08 NOTE — TELEPHONE ENCOUNTER
LVM#2 to schedule initial visit.      Charles Story, PharmD, [facility]  Medication Management Clinic   [address] Ph: [phone]  [facility] Ph: [phone]  2/8/2021 4:00 PM

## 2021-02-14 DIAGNOSIS — M75.01 ADHESIVE CAPSULITIS OF RIGHT SHOULDER: ICD-10-CM

## 2021-02-15 RX ORDER — NAPROXEN 250 MG/1
TABLET ORAL
Qty: 60 TABLET | Refills: 1 | Status: SHIPPED | OUTPATIENT
Start: 2021-02-15 | End: 2021-04-20

## 2021-02-16 NOTE — TELEPHONE ENCOUNTER
LVM #3 to schedule initial visit.      Hilary Mcgraw, PharmD, [facility]  Medication Management Clinic   [address] [facility] Ph: [phone]  Nolvia Perez Ph: [phone]  2/16/2021 4:23 PM

## 2021-02-22 NOTE — TELEPHONE ENCOUNTER
Spoke with patient. He states he has called her back and left voice mail. I gave him the [employer] number to try.  He will try again at his lunch today

## 2021-02-22 NOTE — TELEPHONE ENCOUNTER
Pt has been scheduled for 2/24/21.      Royer Cates, PharmD, [facility]  Medication Management Clinic   [facility] [address] Ph: [phone]  [facility] Ph: [phone]  2/22/2021 1:14 PM

## 2021-02-22 NOTE — TELEPHONE ENCOUNTER
Zoe Duverney, can you try to reach patient to schedule with Diabetes Clinic? We had discussed this at his last visit and he was eager to participate.

## 2021-02-24 ENCOUNTER — VIRTUAL VISIT (OUTPATIENT)
Dept: PHARMACY | Age: 56
End: 2021-02-24
Payer: COMMERCIAL

## 2021-02-24 DIAGNOSIS — E78.00 PURE HYPERCHOLESTEROLEMIA: ICD-10-CM

## 2021-02-24 DIAGNOSIS — I10 ESSENTIAL HYPERTENSION: ICD-10-CM

## 2021-02-24 DIAGNOSIS — E11.9 TYPE 2 DIABETES MELLITUS WITHOUT COMPLICATION, WITHOUT LONG-TERM CURRENT USE OF INSULIN (HCC): Primary | ICD-10-CM

## 2021-02-24 PROCEDURE — 99211 OFF/OP EST MAY X REQ PHY/QHP: CPT

## 2021-02-24 RX ORDER — BLOOD-GLUCOSE METER
EACH MISCELLANEOUS
Qty: 1 KIT | Refills: 0 | Status: SHIPPED | OUTPATIENT
Start: 2021-02-24

## 2021-02-24 RX ORDER — LANCETS 30 GAUGE
EACH MISCELLANEOUS
Qty: 200 EACH | Refills: 5 | Status: SHIPPED | OUTPATIENT
Start: 2021-02-24 | End: 2021-06-14 | Stop reason: SDUPTHER

## 2021-02-24 RX ORDER — BLOOD SUGAR DIAGNOSTIC
STRIP MISCELLANEOUS
Qty: 200 EACH | Refills: 5 | Status: SHIPPED | OUTPATIENT
Start: 2021-02-24 | End: 2021-06-14 | Stop reason: SDUPTHER

## 2021-02-24 NOTE — Clinical Note
Dr Alyce Ybarra, Pt is motivated to make lifestyle changes as you mentioned; he is going to try cutting out the morning donut next. A couple suggestions for meds:   1) Add SGLT2 if affordable  2) He occasionally (avg1x per week) misses PM doses of metformin and glyburide and then doubles up the next AM. I told him not to do double up the next day. Would you want to try switching him to ER formulation (if covered by insurance)? I'm thinking we can leave alone for now since it's only on occasion, but consider in the future if compliance not improved. 3) possibly increase statin intensity and add asa 81 mg. I did not bring up at this visit, but might be worth discussing in future visits. Thanks!  [address]bie [address]

## 2021-02-24 NOTE — PROGRESS NOTES
Disclaimer for Virtual Visits: We want to confirm that, for purposes of billing, this is a virtual visit with your provider for which we will submit a claim for reimbursement with your insurance company. You may be responsible for any copays, coinsurance amounts or other amounts not covered by your insurance company. If you do not accept this, unfortunately we will not be able to schedule a virtual visit with the provider. Do you accept? Yes. CLINICAL PHARMACY NOTEDiabetes    Chelly Joseph is a 63 y.o. male with PMHX significant for HTN, HLD and Type 2 Diabetes referred by Dr Mihai Nuñez for diabetes counseling and medication management. Past Medical History:   Diagnosis Date    Depression     Erectile dysfunction     HLD (hyperlipidemia)     Hypertension     Type II diabetes mellitus (HCC)      Treatment Adherence:  Current diet: Hot chocolate and glazed donut qAM; switched from hot chocolate to hot tea with 2 peppermints (doesn't like coffee). Only eats when hungry. Eats donut to just to have something in stomach before work. Has lunch at work and light dinner. Doesn't like to waste food. Loves raw veggies and spinach salads, but doesn't eat often. Used to juice with beets, kale, spinach, carrots, apple, but someone told him it was high in sugar. Eats large bakery pretzel once per week. Trying to cut out fried foods. Drinks Red Bull 1x per month. Likes sweet tea, but does not drink often. Doesn't eat \"sugary stuff. \" Water \"unlimited\" Yeti 2-3 per day. Metamucil 1x/week  Current exercise: job is strenuous, cannot go to gym 2/2 COVID. Used to [place] at the gym\" and enjoys working out  Weight trend: slowly decreasing per pt  Barriers: cost of meds, COVID restrictions    Type 2 Diabetes Mellitus  Year diagnosed pre-2015. Related hospitalizations/ED visits include: none  Current symptoms/problems include neuropathy, polyuria and polydipsia.   Episodes of hypoglycemia? no  Eye exam current (within one year): no 2019, lost job 2020, but plans to get checked soon. Tobacco history: He  reports that he has never smoked. He has never used smokeless tobacco.   Current diabetes medications: Metformin 1000mg BID and Glyburide 5mg BID   Statin: pravastatin 20, ACE/ARB: losartan 100, no Aspirin  Compliance: compliant most of the time. Misses 0-2 evening doses per week. When he misses metformin and glyburide in PM, he doubles up the next AM    Side effects: none  Previous treatment regimens / response: Invokanamet, Trulicity (1x- no side effects, but prefers to avoid injection for now) and Janumet, all d/c due to cost   Home blood sugar records: rarely, uses late mother's old glucometer, but ran out of supplies. Hypertension:    Patient denies chest pain, shortness of breath, headache, lightheadedness, blurred vision, peripheral edema, palpitations, dry cough and fatigue. He does not usually add salt to foods. Uses Mrs Eloisa Adams. Does not eat out. Tries to eat fresh   On ARB  Medication side effects: no medication side effects noted. Use of agents associated with hypertension: NSAIDS. Caffeine use: 1c tea qAM, red bull 1x per month  Home blood pressure monitoring: No.      Hyperlipidemia:  He is adherent to a low saturated fat/cholesterol diet  On low intensity statin  No new myalgias or GI upset on pravastatin (Pravachol). Cardiovascular risk factors: advanced age (older than 54 for men, 72 for women), diabetes mellitus, dyslipidemia, family history of premature cardiovascular disease, hypertension, male gender and obesity (BMI >= 30 kg/m2)    Social History     Tobacco Use    Smoking status: Never Smoker    Smokeless tobacco: Never Used   Substance Use Topics    Alcohol use: Yes     Frequency: Monthly or less     Drinks per session: 1 or 2     Binge frequency: Never        Physical Exam  There were no vitals taken for this visit. There is no height or weight on file to calculate BMI.   Wt Readings from Last 3 Encounters:   01/08/21 255 lb 9.6 oz (115.9 kg)   09/29/20 253 lb 12.8 oz (115.1 kg)   09/18/18 261 lb (118.4 kg)      BP Readings from Last 3 Encounters:   01/08/21 138/72   09/29/20 124/70   09/18/18 120/74        Pertinent Labs:     Lab Results   Component Value Date    LABA1C 11.8 01/29/2021    LABA1C 8.7 09/29/2020    LABA1C 11.8 04/25/2020      Lab Results   Component Value Date    MALBCR 6.5 01/29/2021    LABMICR 1.30 01/29/2021     No results found for: CRCLEARANCE   Lab Results   Component Value Date    LDLCALC 95 01/29/2021    CHOL 156 01/29/2021    TRIG 84 01/29/2021    HDL 44 01/29/2021     Lab Results   Component Value Date    CREATININE 0.9 01/29/2021    BUN 10 01/29/2021     01/29/2021    K 4.6 01/29/2021    CL 97 (L) 01/29/2021    CO2 27 01/29/2021     Lab Results   Component Value Date    AST 20 04/25/2020    ALT 25 04/25/2020    BILITOT 0.4 04/25/2020    ALKPHOS 131 (H) 04/25/2020     No components found for: VITB12  No results found for: TSH     Allergies   Allergen Reactions    Fish-Derived Products Hives and Shortness Of Breath    Other Hives and Shortness Of Breath     Immunization History   Administered Date(s) Administered    Tdap (Boostrix, Adacel) 10/20/2015     Current Outpatient Medications on File Prior to Visit   Medication Sig Dispense Refill    naproxen (NAPROSYN) 250 MG tablet TAKE 1 TABLET BY MOUTH TWICE A DAY WITH MEALS 60 tablet 1    glyBURIDE (DIABETA) 5 MG tablet Take 1 tablet by mouth twice daily with a meal 180 tablet 1    citalopram (CELEXA) 10 MG tablet TAKE 1 TABLET BY MOUTH DAILY 30 tablet 3    metFORMIN (GLUCOPHAGE) 1000 MG tablet TAKE 1 TABLET BY MOUTH TWICE A DAY WITH MEALS 60 tablet 5    pravastatin (PRAVACHOL) 20 MG tablet TAKE 1 TABLET BY MOUTH EVERY DAY 30 tablet 5    amLODIPine (NORVASC) 10 MG tablet TAKE 1 TABLET BY MOUTH EVERY DAY 90 tablet 3    losartan (COZAAR) 100 MG tablet TAKE 1 TABLET BY MOUTH EVERY DAY 90 tablet 1    alprostadil, Vasodilator, (CAVERJECT) 20 MCG SOLR intracavernosal injection 20 mcg by Intracavernosal route       No current facility-administered medications on file prior to visit. Medication list reviewed and updated. The 10-year ASCVD risk score (Minetta Riedel., et al., 2013) is: 22.7%    Values used to calculate the score:      Age: 63 years      Sex: Male      Is Non- : Yes      Diabetic: Yes      Tobacco smoker: No      Systolic Blood Pressure: 919 mmHg      Is BP treated: Yes      HDL Cholesterol: 44 mg/dL      Total Cholesterol: 156 mg/dL    Assessment/Plan:   1. Type 2 diabetes mellitus without complication, without long-term current use of insulin (HCC)  A1c above goal with significant variations in the past  Pt motivated to make lifestyle changes, but will still likely need additional pharmacotherapy. Discussed options. Pt tolerated GLP1 and SGLT2 in the past, but d/c due to formulary changes/cost. He should be able to use copay card for these now. GLP1 preferred for weight loss, a1c reduction, and CV benefit, but pt prefer to avoid injection for now. Pt agreeable to SGLT2, which has similar benefits. --Start Farxiga 5 mg once daily (preferred SGLT2 acc to [facility]). $15 copay. May use copay card. --Pt instructed to NOT double up on metformin and glyburide in the AM if he missed PM dose. If compliance does not improve, consider change to ER so he can take all meds in AM.   --RX sent for glucometer. Pt to keep SMBG log of FBG.   --Diet/lifestlye goals: switch donut for eggs, oatmeal, raw veggies, or low sugar kale/spinach smoothie in the morning. Increase veggies throughout the day. --Due for eye exam, counseled  --Due for pna vaccine, will address at future visit. 2. Essential hypertension  Last BP slightly above goal <130/80, but appears to normally be at goal in office. SGTL2 will help reduce BP  No albuminuria  On ARB     3.  Pure hypercholesterolemia  On low intensity

## 2021-02-24 NOTE — Clinical Note
Sounds good! I am hopeful that with his new insurance and use of copay card, he can afford sglt2. It appears that Porfirio Elizondo is preferred SGLT2, but I can certainly try Invokana if you prefer. Next visit 3/18/21.

## 2021-03-02 RX ORDER — PRAVASTATIN SODIUM 40 MG
40 TABLET ORAL DAILY
Qty: 90 TABLET | Refills: 1 | Status: SHIPPED | OUTPATIENT
Start: 2021-03-02 | End: 2021-06-14 | Stop reason: SDUPTHER

## 2021-03-17 RX ORDER — LOSARTAN POTASSIUM 100 MG/1
TABLET ORAL
Qty: 30 TABLET | Refills: 5 | Status: SHIPPED | OUTPATIENT
Start: 2021-03-17 | End: 2021-06-14 | Stop reason: SDUPTHER

## 2021-03-30 ENCOUNTER — VIRTUAL VISIT (OUTPATIENT)
Dept: PHARMACY | Age: 56
End: 2021-03-30
Payer: COMMERCIAL

## 2021-03-30 DIAGNOSIS — E11.9 TYPE 2 DIABETES MELLITUS WITHOUT COMPLICATION, WITHOUT LONG-TERM CURRENT USE OF INSULIN (HCC): Primary | ICD-10-CM

## 2021-03-30 PROCEDURE — 99212 OFFICE O/P EST SF 10 MIN: CPT

## 2021-03-30 NOTE — PROGRESS NOTES
Disclaimer for Virtual Visits: We want to confirm that, for purposes of billing, this is a virtual visit with your provider for which we will submit a claim for reimbursement with your insurance company. You may be responsible for any copays, coinsurance amounts or other amounts not covered by your insurance company. If you do not accept this, unfortunately we will not be able to schedule a virtual visit with the provider. Do you accept? Yes. CLINICAL PHARMACY NOTEDiabetes    Memo Fung is a 63 y.o. male with PMHX significant for HTN, HLD and Type 2 Diabetes referred by Dr Cece Tam for diabetes counseling and medication management. Past Medical History:   Diagnosis Date    Depression     Erectile dysfunction     HLD (hyperlipidemia)     Hypertension     Type II diabetes mellitus (HCC)      Treatment Adherence:  Current diet: Hot chocolate and glazed donut qAM; switched from hot chocolate to hot tea with 2 peppermints (doesn't like coffee). Only eats when hungry. Eats donut to just to have something in stomach before work. Has lunch at work and light dinner. Doesn't like to waste food. Loves raw veggies and spinach salads, but doesn't eat often. Used to juice with beets, kale, spinach, carrots, apple, but someone told him it was high in sugar. Eats large bakery pretzel once per week. Trying to cut out fried foods. Drinks Red Bull 1x per month. Likes sweet tea, but does not drink often. Doesn't eat \"sugary stuff. \" Water \"unlimited\" Yeti 2-3 per day. Metamucil 1x/week   3/30/21: stopped Red Bull and hot chocolate with donut, replaced with black tea with 2 peppermints and rice cake; reports 'no sugar in the house.' Drowsy from new antidepressant so sometimes sleeps through dinner. Still hydrating with lots of water every day. Current exercise: job is strenuous and works long hours in [employer] (looking for different job), cannot go to gym 2/2 [facility].  Used to [place] at the gym\" and enjoys working out  Weight trend: slowly decreasing per pt  Barriers: cost of meds, COVID restrictions    Type 2 Diabetes Mellitus  Year diagnosed pre-2015. Related hospitalizations/ED visits include: none  Current symptoms/problems include neuropathy, polyuria and polydipsia. Episodes of hypoglycemia? no  Eye exam current (within one year): no 2019, lost job 2020, but plans to get checked soon. Tobacco history: He  reports that he has never smoked. He has never used smokeless tobacco.   Current diabetes medications: Metformin 1000mg BID, Glyburide 5mg BID, Farxiga 5 mg daily  Statin: pravastatin 40, ACE/ARB: losartan 100, no Aspirin  Compliance: compliant most of the time. Missed ~1 dose since last visit. Reports no longer doubling doses the morning after missing PM metformin and glyburide. Side effects: none  Previous treatment regimens / response: Invokanamet, Trulicity (1x- no side effects, but prefers to avoid injection for now) and Janumet, all d/c due to cost   Home blood sugar records: received glucometer prescribed at last visit; reports checking FBG a couple of times a week. Did not have log but reports readings in the mid 100s. Hypertension:    Patient denies chest pain, shortness of breath, headache, lightheadedness, blurred vision, peripheral edema, palpitations, dry cough and fatigue. He does not usually add salt to foods. Uses Mrs Kal Wang. Does not eat out. Tries to eat fresh   On ARB  Medication side effects: no medication side effects noted. Use of agents associated with hypertension: NSAIDS. Caffeine use: 1c tea qAM  Home blood pressure monitoring: No.      Hyperlipidemia:  He is adherent to a low saturated fat/cholesterol diet  On moderate intensity statin  No new myalgias or GI upset on pravastatin (Pravachol).     Cardiovascular risk factors: advanced age (older than 54 for men, 72 for women), diabetes mellitus, dyslipidemia, family history of premature cardiovascular disease, hypertension, male gender and obesity (BMI >= 30 kg/m2)     Depression:  Patient reports significant depression related to job stresses. Current medication: Celexa 10 mg daily (start 01/2021)  Compliance: Reports using as needed. Discussed need to take daily x4-6 weeks to see benefit. Side effects: Drowsiness (limits adherence)      Social History     Tobacco Use    Smoking status: Never Smoker    Smokeless tobacco: Never Used   Substance Use Topics    Alcohol use: Yes     Frequency: Monthly or less     Drinks per session: 1 or 2     Binge frequency: Never        Physical Exam  There were no vitals taken for this visit. There is no height or weight on file to calculate BMI.   Wt Readings from Last 3 Encounters:   01/08/21 255 lb 9.6 oz (115.9 kg)   09/29/20 253 lb 12.8 oz (115.1 kg)   09/18/18 261 lb (118.4 kg)      BP Readings from Last 3 Encounters:   01/08/21 138/72   09/29/20 124/70   09/18/18 120/74        Pertinent Labs:     Lab Results   Component Value Date    LABA1C 11.8 01/29/2021    LABA1C 8.7 09/29/2020    LABA1C 11.8 04/25/2020      Lab Results   Component Value Date    MALBCR 6.5 01/29/2021    LABMICR 1.30 01/29/2021     No results found for: CRCLEARANCE   Lab Results   Component Value Date    LDLCALC 95 01/29/2021    CHOL 156 01/29/2021    TRIG 84 01/29/2021    HDL 44 01/29/2021     Lab Results   Component Value Date    CREATININE 0.9 01/29/2021    BUN 10 01/29/2021     01/29/2021    K 4.6 01/29/2021    CL 97 (L) 01/29/2021    CO2 27 01/29/2021     Lab Results   Component Value Date    AST 20 04/25/2020    ALT 25 04/25/2020    BILITOT 0.4 04/25/2020    ALKPHOS 131 (H) 04/25/2020     No components found for: VITB12  No results found for: TSH     Allergies   Allergen Reactions    Fish-Derived Products Hives and Shortness Of Breath    Other Hives and Shortness Of Breath     Immunization History   Administered Date(s) Administered    Tdap (Boostrix, Adacel) 10/20/2015     Current Outpatient Medications on File Prior to Visit   Medication Sig Dispense Refill    FARXIGA 5 MG tablet TAKE 1 TABLET BY MOUTH EVERY MORNING 30 tablet 5    losartan (COZAAR) 100 MG tablet TAKE 1 TABLET BY MOUTH EVERY DAY 30 tablet 5    pravastatin (PRAVACHOL) 40 MG tablet Take 1 tablet by mouth daily 90 tablet 1    Blood Glucose Monitoring Suppl (ONETOUCH VERIO) w/Device KIT Use to check blood sugar BID before meals or as needed. 1 kit 0    blood glucose test strips (ONETOUCH VERIO) strip Use to check blood sugar BID before meals or as needed. 200 each 5    Lancets MISC Use to check blood sugar BID before meals or as needed. 200 each 5    naproxen (NAPROSYN) 250 MG tablet TAKE 1 TABLET BY MOUTH TWICE A DAY WITH MEALS 60 tablet 1    glyBURIDE (DIABETA) 5 MG tablet Take 1 tablet by mouth twice daily with a meal 180 tablet 1    citalopram (CELEXA) 10 MG tablet TAKE 1 TABLET BY MOUTH DAILY 30 tablet 3    metFORMIN (GLUCOPHAGE) 1000 MG tablet TAKE 1 TABLET BY MOUTH TWICE A DAY WITH MEALS 60 tablet 5    amLODIPine (NORVASC) 10 MG tablet TAKE 1 TABLET BY MOUTH EVERY DAY 90 tablet 3    alprostadil, Vasodilator, (CAVERJECT) 20 MCG SOLR intracavernosal injection 20 mcg by Intracavernosal route       No current facility-administered medications on file prior to visit. Medication list reviewed and updated. The 10-year ASCVD risk score (Zeina Bejarano, et al., 2013) is: 22.7%    Values used to calculate the score:      Age: 63 years      Sex: Male      Is Non- : Yes      Diabetic: Yes      Tobacco smoker: No      Systolic Blood Pressure: 368 mmHg      Is BP treated: Yes      HDL Cholesterol: 44 mg/dL      Total Cholesterol: 156 mg/dL    Assessment/Plan:   1.  Type 2 diabetes mellitus without complication, without long-term current use of insulin (HCC)  A1c above goal with significant variations in the past  Pt motivated to make lifestyle changes, but will still likely need additional pharmacotherapy. Discussed options. Pt tolerated GLP1 and SGLT2 in the past, but d/c due to formulary changes/cost. He should be able to use copay card for these now. GLP1 preferred for weight loss, a1c reduction, and CV benefit, but pt prefer to avoid injection for now. Pt agreeable to SGLT2, which has similar benefits. --Order sent to lab to evaluate kidney function and electrolytes after starting Magali Carney (patient tolerating well). $15 copay. May use copay card. Will plan to increase dose to 10 mg if results are WNL.  --Patient to keep SMBG log of FBG and have to review at next visit  --Diet/lifestlye goals: Increase veggies throughout the day. --Due for eye exam, continue to monitor  --Due for pna vaccine, will address at future visit. 2. Essential hypertension  Last BP slightly above goal <130/80, but appears to normally be at goal in office. SGTL2 will help reduce BP  No albuminuria   On ARB     3. Pure hypercholesterolemia  On moderate intensity statin. Increased to pravastatin 40 mg at last visit, continue. Based on 10-yr ASCVD >20%, could consider high-intensity statin therapy as well. Consider addition of asa 81 mg after risk/benefit discussion, will address at future visit. .  Repeat FLP: annually    4. Depression  Prescribed Celexa this past January  Instructed patient to take Celexa daily x4-6 weeks and if he is still experiencing drowsiness discuss switching to another SSRI with PCP.       Health Maintenance Due   Topic Date Due    Hepatitis C screen  Never done    Pneumococcal 0-64 years Vaccine (1 of 1 - PPSV23) Never done    Diabetic retinal exam  Never done    HIV screen  Never done    COVID-19 Vaccine (1) Never done    Hepatitis B vaccine (1 of 3 - Risk 3-dose series) Never done    Shingles Vaccine (1 of 2) Never done    Colon cancer screen colonoscopy  Never done     Education provided:  Reviewed A1c and blood sugar goals with patient  Reviewed meter instruction technique and importance of checking BG twice a day before breakfast and 2 hr after meals. Provided BG log and instructed patient to bring to f/u appointment. Discussed symptoms and management of hypoglycemic episodes. Encouraged weight loss  Encouraged aerobic exercise   Educated on diet (low carb/sugar, DASH, low cholesterol)    Follow up: 2 wk    Discussed with patient the Pharmacist Collaborative Practice Agreement. Patient provided verbal and/or electronic (ex. mychart) consent to participate in the collaborative practice agreement between the pharmacist and referred patient. This is in lieu of paper consent due to COVID-19 precautions and the use of remote/virtual visits.      Time spent with patient: 30 min    Miguel Ángel Lucero PharmD  PGY1 Pharmacy Resident  [facility] ([facility]) Medication Management  [address]: [phone]    CLINICAL PHARMACY CONSULT: MED RECONCILIATION/REVIEW ADDENDUM    For Pharmacy Admin Tracking Only    PHSO: Yes  Total # of Interventions Recommended: 0  - Maintenance Safety Lab Monitoring #: 1 - lab work to assess ability to increase Farxiga  Total Interventions Accepted: 0  Time Spent (min): 30

## 2021-04-15 ENCOUNTER — VIRTUAL VISIT (OUTPATIENT)
Dept: PHARMACY | Age: 56
End: 2021-04-15
Payer: COMMERCIAL

## 2021-04-15 DIAGNOSIS — I10 ESSENTIAL HYPERTENSION: ICD-10-CM

## 2021-04-15 DIAGNOSIS — E78.00 PURE HYPERCHOLESTEROLEMIA: ICD-10-CM

## 2021-04-15 DIAGNOSIS — E11.9 TYPE 2 DIABETES MELLITUS WITHOUT COMPLICATION, WITHOUT LONG-TERM CURRENT USE OF INSULIN (HCC): Primary | ICD-10-CM

## 2021-04-15 PROCEDURE — 99212 OFFICE O/P EST SF 10 MIN: CPT

## 2021-04-15 NOTE — Clinical Note
FYI-  Sugar look great past week and he lost 15 lbs since Jan!   Talked a lot about his mood today (depressed due to current employment). Discussed coping mechanisms. He is willing to give celexa a 4 wk trial (was only taking it prn before). He asked about Sunosi because he was told he has sleep apnea in the past.  Discussed that he should ask you about a sleep study 1st (he's never had one).

## 2021-04-15 NOTE — PROGRESS NOTES
Disclaimer for Virtual Visits: We want to confirm that, for purposes of billing, this is a virtual visit with your provider for which we will submit a claim for reimbursement with your insurance company. You may be responsible for any copays, coinsurance amounts or other amounts not covered by your insurance company. If you do not accept this, unfortunately we will not be able to schedule a virtual visit with the provider. Do you accept? Yes. CLINICAL PHARMACY NOTEDiabetes    Michael Bartlett is a 63 y.o. male with PMHX significant for HTN, HLD and Type 2 Diabetes referred by Dr Trev España for diabetes counseling and medication management. Past Medical History:   Diagnosis Date    Depression     Erectile dysfunction     HLD (hyperlipidemia)     Hypertension     Type II diabetes mellitus (HCC)      Treatment Adherence:  Diet: Hot chocolate and glazed donut qAM; switched from hot chocolate to hot tea with 2 peppermints (doesn't like coffee). Only eats when hungry. Eats donut to just to have something in stomach before work. Has lunch at work and light dinner. Doesn't like to waste food. Loves raw veggies and spinach salads, but doesn't eat often. Used to juice with beets, kale, spinach, carrots, apple, but someone told him it was high in sugar. Eats large bakery pretzel once per week. Trying to cut out fried foods. Drinks Red Bull 1x per month. Likes sweet tea, but does not drink often. Doesn't eat \"sugary stuff. \" Water \"unlimited\" Yeti 2-3 per day. Metamucil 1x/week   3/30/21: stopped Red Bull and hot chocolate with donut, replaced with black tea with 2 peppermints and rice cake; reports 'no sugar in the house.' Drowsy from new antidepressant so sometimes sleeps through dinner. Still hydrating with lots of water every day.   4/15/21: drank only 1 pop in 2 weeks, a lot of water, black tea qAM with peppermint, ice cream and cake over the weekend for brother's bday, but otherwise continues with positive lifestyle changes. Only eats when he is hungry. Tries to eat earlier dinner, not right before bed. 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 Used to [place] at the gym\" and enjoys working out  Weight trend: 4/15/21 home weight is 240 lbs! Lost ~15 lbs since Jan    Barriers: cost of meds, COVID restrictions    Type 2 Diabetes Mellitus  Year diagnosed pre-2015. Related hospitalizations/ED visits include: none  Current symptoms/problems include neuropathy, polyuria and polydipsia. Episodes of hypoglycemia? no  Eye exam current (within one year): no 2019, lost job 2020, but plans to get checked soon. Tobacco history: He  reports that he has never smoked. He has never used smokeless tobacco.   Current diabetes medications: Metformin 1000mg BID, Glyburide 5mg BID, Farxiga 5 mg daily  Compliance: compliant most of the time. Reports no longer doubling doses the morning after missing PM metformin and glyburide. Side effects: none  Previous treatment regimens / response: Invokanamet, Trulicity (1x- no side effects, but prefers to avoid injection for now) and Janumet, all d/c due to cost     Home blood sugar records:   4/12/21 FBG 75, bedtime 115  4/13/21 FBG 78, bedtime 120  4/14/21 FBG 77, bedtime 127  4/15/21 FBG 79    Hypertension:    Patient denies chest pain, shortness of breath, headache, lightheadedness, blurred vision, peripheral edema, palpitations, dry cough and fatigue. He does not usually add salt to foods. Uses Mrs Virl Solders. Does not eat out. Tries to eat fresh   On ARB--losartan 100  Medication side effects: no medication side effects noted. Use of agents associated with hypertension: NSAIDS.    Caffeine use: 1c tea qAM  Home blood pressure monitoring: No.      Hyperlipidemia:  He is adherent to a low saturated fat/cholesterol diet  On moderate intensity statin-- pravastatin 40  no Aspirin  No new myalgias or GI upset on pravastatin (Pravachol). Cardiovascular risk factors: advanced age (older than 54 for men, 72 for women), diabetes mellitus, dyslipidemia, family history of premature cardiovascular disease, hypertension, male gender and obesity (BMI >= 30 kg/m2)     Depression:  Patient reports significant depression related to job. Looking for new job. Current medication: Celexa 10 mg daily started 01/2021, but started taking daily as directed on 3/30/21 and has not seen much benefit. Not motivated to do things for himself like go to [employer] or see his friends. Just wants to sleep all day when he is off work. Compliance: Reports using as needed. Discussed need to take daily x4-6 weeks to see benefit. Side effects: Drowsiness  Pt reports dx of sleep apnea, but hasn't had sleep study. States this was not covered by insurance in the past. He wants to know if Sunosi is a medication option for him. Social History     Tobacco Use    Smoking status: Never Smoker    Smokeless tobacco: Never Used   Substance Use Topics    Alcohol use: Yes     Frequency: Monthly or less     Drinks per session: 1 or 2     Binge frequency: Never        Physical Exam  There were no vitals taken for this visit. There is no height or weight on file to calculate BMI.   Wt Readings from Last 3 Encounters:   01/08/21 255 lb 9.6 oz (115.9 kg)   09/29/20 253 lb 12.8 oz (115.1 kg)   09/18/18 261 lb (118.4 kg)      BP Readings from Last 3 Encounters:   01/08/21 138/72   09/29/20 124/70   09/18/18 120/74        Pertinent Labs:     Lab Results   Component Value Date    LABA1C 11.8 01/29/2021    LABA1C 8.7 09/29/2020    LABA1C 11.8 04/25/2020      Lab Results   Component Value Date    MALBCR 6.5 01/29/2021    LABMICR 1.30 01/29/2021     No results found for: CRCLEARANCE   Lab Results   Component Value Date    LDLCALC 95 01/29/2021    CHOL 156 01/29/2021    TRIG 84 01/29/2021    HDL 44 01/29/2021     Lab Results ASCVD risk score (Marisa Loyd, et al., 2013) is: 22.7%    Values used to calculate the score:      Age: 63 years      Sex: Male      Is Non- : Yes      Diabetic: Yes      Tobacco smoker: No      Systolic Blood Pressure: 493 mmHg      Is BP treated: Yes      HDL Cholesterol: 44 mg/dL      Total Cholesterol: 156 mg/dL    Assessment/Plan:   1. Type 2 diabetes mellitus without complication, without long-term current use of insulin (HCC)  Last A1c above goal, due for repeat. Current blood sugar levels are at goal.   Congratulated pt on weight loss and lifestyle changes. --A1c, BMP  --Next options: consider increasing Farxiga to 10 mg or adding GLP1 in lieu of glyburide. --Diet/lifestlye goals: Increase veggies throughout the day. --Due for eye exam  --Due for pna vaccine, will address at future visit. 2. Essential hypertension  Last BP slightly above goal <130/80, but appears to normally be at goal in office. SGTL2 will help reduce BP  No albuminuria   On ARB     3. Pure hypercholesterolemia  On moderate intensity statin. Increased to pravastatin 40 mg at last visit, continue. Based on 10-yr ASCVD >20%, could consider high-intensity statin therapy as well. Consider addition of asa 81 mg after risk/benefit discussion, will address at future visit. .  Repeat FLP: annually    4. Depression/  Started taking Celexa regularly ~2 wks ago. Patient willing to give it a 4-6 week trial.   Recommend asking PCP about sleep study referral.   Discussed that Sunosi does not treat the cause of ANGEL or replace CPAP.   Goals: Dedicate at least 1 hour each week to doing something you enjoy (S[employer]ism, time with friends)     Health Maintenance Due   Topic Date Due    Hepatitis C screen  Never done    Pneumococcal 0-64 years Vaccine (1 of 1 - PPSV23) Never done    Diabetic retinal exam  Never done    HIV screen  Never done    COVID-19 Vaccine (1) Never done    Hepatitis B vaccine (1 of 3 - Risk 3-dose series) Never done    Shingles Vaccine (1 of 2) Never done    Colon cancer screen colonoscopy  Never done    A1C test (Diabetic or Prediabetic)  04/29/2021     Education provided:  Reviewed A1c and blood sugar goals with patient  Reviewed meter instruction technique and importance of checking BG twice a day before breakfast and 2 hr after meals. Provided BG log and instructed patient to bring to f/u appointment. Discussed symptoms and management of hypoglycemic episodes. Encouraged weight loss  Encouraged aerobic exercise   Educated on diet (low carb/sugar, DASH, low cholesterol)    Follow up: 2 wk    Discussed with patient the Pharmacist Collaborative Practice Agreement. Patient provided verbal and/or electronic (ex. Laserlikehart) consent to participate in the collaborative practice agreement between the pharmacist and referred patient. This is in lieu of paper consent due to COVID-19 precautions and the use of remote/virtual visits.      Time spent with patient: 39 min    Vitaliy Suarez, PharmD, [facility]  Medication Management [facility]   [address] Ph: [phone]  [address] Ph: [phone]  4/15/2021 10:29 AM      CLINICAL PHARMACY CONSULT: MED RECONCILIATION/REVIEW ADDENDUM    For Pharmacy Admin Tracking Only    PHSO: Yes  Total # of Interventions Recommended: 0  - Maintenance Safety Lab Monitoring #: 1   Total Interventions Accepted: 0  Time Spent (min): 45

## 2021-04-20 DIAGNOSIS — M75.01 ADHESIVE CAPSULITIS OF RIGHT SHOULDER: ICD-10-CM

## 2021-04-20 DIAGNOSIS — E11.9 TYPE 2 DIABETES MELLITUS WITHOUT COMPLICATION, WITHOUT LONG-TERM CURRENT USE OF INSULIN (HCC): ICD-10-CM

## 2021-04-21 RX ORDER — NAPROXEN 250 MG/1
TABLET ORAL
Qty: 60 TABLET | Refills: 5 | Status: SHIPPED | OUTPATIENT
Start: 2021-04-21 | End: 2021-06-14 | Stop reason: SDUPTHER

## 2021-04-27 DIAGNOSIS — F32.A DEPRESSIVE DISORDER: ICD-10-CM

## 2021-04-27 DIAGNOSIS — Z13.31 POSITIVE DEPRESSION SCREENING: ICD-10-CM

## 2021-04-29 RX ORDER — CITALOPRAM 10 MG/1
TABLET ORAL
Qty: 30 TABLET | Refills: 3 | Status: SHIPPED | OUTPATIENT
Start: 2021-04-29 | End: 2021-06-16 | Stop reason: SDUPTHER

## 2021-05-13 ENCOUNTER — VIRTUAL VISIT (OUTPATIENT)
Dept: PHARMACY | Age: 56
End: 2021-05-13
Payer: COMMERCIAL

## 2021-05-13 DIAGNOSIS — I10 ESSENTIAL HYPERTENSION: ICD-10-CM

## 2021-05-13 DIAGNOSIS — E11.9 TYPE 2 DIABETES MELLITUS WITHOUT COMPLICATION, WITHOUT LONG-TERM CURRENT USE OF INSULIN (HCC): Primary | ICD-10-CM

## 2021-05-13 DIAGNOSIS — E78.00 PURE HYPERCHOLESTEROLEMIA: ICD-10-CM

## 2021-05-13 PROCEDURE — 99213 OFFICE O/P EST LOW 20 MIN: CPT

## 2021-05-13 RX ORDER — DULAGLUTIDE 0.75 MG/.5ML
0.75 INJECTION, SOLUTION SUBCUTANEOUS WEEKLY
Qty: 4 PEN | Refills: 0 | Status: SHIPPED | OUTPATIENT
Start: 2021-05-13 | End: 2021-06-08 | Stop reason: SDUPTHER

## 2021-05-13 NOTE — Clinical Note
BGs look great! Would like to sub Trulicity instead of glyburide for wt loss and CV benefit if you agree? He can use copay card.

## 2021-05-13 NOTE — PROGRESS NOTES
Disclaimer for Virtual Visits: We want to confirm that, for purposes of billing, this is a virtual visit with your provider for which we will submit a claim for reimbursement with your insurance company. You may be responsible for any copays, coinsurance amounts or other amounts not covered by your insurance company. If you do not accept this, unfortunately we will not be able to schedule a virtual visit with the provider. Do you accept? Yes. CLINICAL PHARMACY NOTEDiabetes    Mercedez Long is a 63 y.o. male with PMHX significant for HTN, HLD and Type 2 Diabetes referred by Dr Mahnaz Rosenthal for diabetes counseling and medication management. Past Medical History:   Diagnosis Date    Depression     Erectile dysfunction     HLD (hyperlipidemia)     Hypertension     Type II diabetes mellitus (HCC)      Treatment Adherence:  Diet: Hot chocolate and glazed donut qAM; switched from hot chocolate to hot tea with 2 peppermints (doesn't like coffee). Only eats when hungry. Eats donut to just to have something in stomach before work. Has lunch at work and light dinner. Doesn't like to waste food. Loves raw veggies and spinach salads, but doesn't eat often. Used to juice with beets, kale, spinach, carrots, apple, but someone told him it was high in sugar. Eats large bakery pretzel once per week. Trying to cut out fried foods. Drinks Red Bull 1x per month. Likes sweet tea, but does not drink often. Doesn't eat \"sugary stuff. \" Water \"unlimited\" Yeti 2-3 per day. Metamucil 1x/week   3/30/21: Stopped Red Bull and hot chocolate with donut, replaced with black tea with 2 peppermints and rice cake; reports 'no sugar in the house.' Drowsy from new antidepressant so sometimes sleeps through dinner. Still hydrating with lots of water every day.   4/15/21: Drank only 1 pop in 2 weeks, a lot of water, black tea qAM with peppermint, ice cream and cake over the weekend for brother's bday, but otherwise continues with positive lifestyle changes. Only eats when he is hungry. Tries to eat earlier dinner, not right before bed.    5/13/21: Hot chocolate only once b/c he was given some for free. Otherwise he has been really consistent with lifestyle changes. Treated himself to one drink on Mother's day with his sister. Exercise: job is strenuous- works long hours in [employer] and is affecting his mental health (looking for different job), cannot go to gym 2/2 COVID. Used to [place] at the gym\" and enjoys working out    Weight trend: 4/15/21 home weight is 240 lbs! Lost ~15 lbs since Jan; hasn't weighed since April    Barriers: cost of meds, COVID restrictions; reports the pharmacy told him that his Gordan Dial was $500 this month. He has ~1 week supply left, but cannot afford going forward. Not eligible for AZandME. Willing to try GLP1 if unable to get [place]. Did not go to lab last weekend because he overslept, but plans to go to [facility] [place] for A1c. Type 2 Diabetes Mellitus  Year diagnosed pre-2015. Related hospitalizations/ED visits include: none  Current symptoms/problems include neuropathy, polyuria and polydipsia. Episodes of hypoglycemia? no  Eye exam current (within one year): no 2019, lost job 2020, but plans to get checked soon. Tobacco history: He  reports that he has never smoked. He has never used smokeless tobacco.   Current diabetes medications: Metformin 1000mg BID, Glyburide 5mg BID, Farxiga 5 mg daily  Compliance: compliant most of the time. Reports no longer doubling doses the morning after missing PM metformin and glyburide. Side effects: none  Previous treatment regimens / response:  Invokanamet, Trulicity (1x- no side effects, but prefers to avoid injection for now) and Janumet, all d/c due to cost     Home blood sugar records:   4/15/21 FBG 75-79, bedtime 115-127  5/13/21 reports FBG consistently in 70s,  bedtime 115-120    Hypertension:    Patient denies chest pain, shortness of breath, headache, lightheadedness, blurred vision, peripheral edema, palpitations, dry cough and fatigue. He does not usually add salt to foods. Uses Mrs Timmy Lyon. Does not eat out. Tries to eat fresh   On ARB--losartan 100  Medication side effects: no medication side effects noted. Use of agents associated with hypertension: NSAIDS. Caffeine use: 1c tea qAM  Home blood pressure monitoring: No.      Hyperlipidemia:  He is adherent to a low saturated fat/cholesterol diet  On moderate intensity statin-- pravastatin 40  No Aspirin  No new myalgias or GI upset on pravastatin (Pravachol). Cardiovascular risk factors: advanced age (older than 54 for men, 72 for women), diabetes mellitus, dyslipidemia, family history of premature cardiovascular disease, hypertension, male gender and obesity (BMI >= 30 kg/m2)     Depression:   Patient reports depression related to job. Looking for new job. Hasn't made it to [employer] yet--too tired. Not motivated to do things for himself like go to [employer] or see his friends. Just wants to sleep all day when he is off work. Feels okay on Saturdays (catches up on sleep), but very depressed on Sundays, as he knows he will be going back to work on Monday. 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 Bought himself a video game. Had one drink and then had water. Felt really good all day. He thinks citalopram is working better, but still makes him drowsy. He will continue for now and talk to PCP in July if he wants to try an alternative. Current medication: Celexa 10 mg daily started taking daily as directed on 3/30/21. Compliance: good, previously was only taking prn  Side effects: Drowsiness (takes at bedtime)   Pt reports dx of sleep apnea, but hasn't had sleep study. States this was not covered by insurance in the past. He wants to know if Sunosi is a medication option for him.      Social History     Tobacco Use    Smoking status: Never Smoker    Smokeless tobacco: Never Used   Substance Use Topics    Alcohol use: Yes     Frequency: Monthly or less     Drinks per session: 1 or 2     Binge frequency: Never        Physical Exam  There were no vitals taken for this visit. There is no height or weight on file to calculate BMI.   Wt Readings from Last 3 Encounters:   01/08/21 255 lb 9.6 oz (115.9 kg)   09/29/20 253 lb 12.8 oz (115.1 kg)   09/18/18 261 lb (118.4 kg)      BP Readings from Last 3 Encounters:   01/08/21 138/72   09/29/20 124/70   09/18/18 120/74        Pertinent Labs:     Lab Results   Component Value Date    LABA1C 11.8 01/29/2021    LABA1C 8.7 09/29/2020    LABA1C 11.8 04/25/2020      Lab Results   Component Value Date    MALBCR 6.5 01/29/2021    LABMICR 1.30 01/29/2021     No results found for: CRCLEARANCE   Lab Results   Component Value Date    LDLCALC 95 01/29/2021    CHOL 156 01/29/2021    TRIG 84 01/29/2021    HDL 44 01/29/2021     Lab Results   Component Value Date    CREATININE 0.9 01/29/2021    BUN 10 01/29/2021     01/29/2021    K 4.6 01/29/2021    CL 97 (L) 01/29/2021    CO2 27 01/29/2021     Lab Results   Component Value Date    AST 20 04/25/2020    ALT 25 04/25/2020    BILITOT 0.4 04/25/2020    ALKPHOS 131 (H) 04/25/2020     No components found for: VITB12  No results found for: TSH     Allergies   Allergen Reactions    Fish-Derived Products Hives and Shortness Of Breath    Other Hives and Shortness Of Breath     Immunization History   Administered Date(s) Administered    Tdap (Boostrix, Adacel) 10/20/2015     Current Outpatient Medications on File Prior to Visit   Medication Sig Dispense Refill    citalopram (CELEXA) 10 MG tablet TAKE 1 TABLET BY MOUTH EVERY DAY 30 tablet 3    naproxen (NAPROSYN) 250 MG tablet TAKE 1 TABLET BY MOUTH TWICE A DAY WITH MEALS 60 tablet 5    metFORMIN (GLUCOPHAGE) 1000 MG tablet TAKE 1 TABLET BY MOUTH TWICE A DAY WITH MEALS 60 tablet 5    FARXIGA 5 MG tablet TAKE 1 TABLET BY MOUTH EVERY MORNING 30 tablet 5    losartan (COZAAR) 100 MG tablet TAKE 1 TABLET BY MOUTH EVERY DAY 30 tablet 5    pravastatin (PRAVACHOL) 40 MG tablet Take 1 tablet by mouth daily 90 tablet 1    Blood Glucose Monitoring Suppl (ONETOUCH VERIO) w/Device KIT Use to check blood sugar BID before meals or as needed. 1 kit 0    blood glucose test strips (ONETOUCH VERIO) strip Use to check blood sugar BID before meals or as needed. 200 each 5    Lancets MISC Use to check blood sugar BID before meals or as needed. 200 each 5    glyBURIDE (DIABETA) 5 MG tablet Take 1 tablet by mouth twice daily with a meal 180 tablet 1    amLODIPine (NORVASC) 10 MG tablet TAKE 1 TABLET BY MOUTH EVERY DAY 90 tablet 3    alprostadil, Vasodilator, (CAVERJECT) 20 MCG SOLR intracavernosal injection 20 mcg by Intracavernosal route       No current facility-administered medications on file prior to visit. Medication list reviewed and updated. The 10-year ASCVD risk score (Rafi Golden., et al., 2013) is: 22.7%    Values used to calculate the score:      Age: 63 years      Sex: Male      Is Non- : Yes      Diabetic: Yes      Tobacco smoker: No      Systolic Blood Pressure: 510 mmHg      Is BP treated: Yes      HDL Cholesterol: 44 mg/dL      Total Cholesterol: 156 mg/dL    Assessment/Plan:   1. Type 2 diabetes mellitus without complication, without long-term current use of insulin (HCC)  Last A1c above goal, but current blood sugar levels are at goal.   -Continue metformin 1g BID, farixga 5 mg qd  -Stop glyburide  -Start Trulicity 0.97 mg once weekly (for wt loss and CV benefit, lower risk hypoglycemia). Pt counseled on medication, inj technique, dosing instructions, possible side effects. Will titrate as needed/tolerated. -Repeat A1c, BMP  -Spent ~45 min on phone with pharmacy and insurance company to determine why his cost jumped to ~$500 and to work out patient assistance.  Apparently his copay card was , and he is required to now get a 90 DS from only [facility] or [facility]. Sent new RX to [facility] for 90ds along with new copay card for $0. Also sent Trulicity RX and copay card for $25. Will send 90ds next time after possibly increasing dose. -Due for eye exam  -Due for pna vaccine, will address at future visit. 2. Essential hypertension  Last BP slightly above goal <130/80, but appears to normally be at goal in office. SGTL2 will help reduce BP  No albuminuria   On ARB     3. Pure hypercholesterolemia  On moderate intensity statin-- pravastatin 40 mg  Based on 10-yr ASCVD >20%, could consider high-intensity statin therapy   Consider addition of asa 81 mg after risk/benefit discussion, will address at future visit. .  Repeat FLP: annually    4. Depression  Compliant with Celexa for the past ~6 weeks, still suffers depression mostly as a result of his job. Pt thinks medication helps, but causes drowsiness. He would like to continue and will speak with PCP in July if he wants to try different SSRI. Recommend asking PCP about sleep study referral.   Goals: Dedicate at least 1 hour each week to doing something you enjoy ([employer], time with friends or sister)     Health Maintenance Due   Topic Date Due    Hepatitis C screen  Never done    Pneumococcal 0-64 years Vaccine (1 of 1 - PPSV23) Never done    Diabetic retinal exam  Never done    COVID-19 Vaccine (1) Never done    HIV screen  Never done    Hepatitis B vaccine (1 of 3 - Risk 3-dose series) Never done    Shingles Vaccine (1 of 2) Never done    Colon cancer screen colonoscopy  Never done    A1C test (Diabetic or Prediabetic)  2021     Education provided:  Reviewed A1c and blood sugar goals with patient  Reviewed meter instruction technique and importance of checking BG twice a day before breakfast and 2 hr after meals. Provided BG log and instructed patient to bring to f/u appointment.    Discussed symptoms and management of hypoglycemic episodes. Encouraged weight loss  Encouraged aerobic exercise   Educated on diet (low carb/sugar, DASH, low cholesterol)    Follow up: 1 wk    Discussed with patient the Pharmacist Collaborative Practice Agreement. Patient provided verbal and/or electronic (ex. mychart) consent to participate in the collaborative practice agreement between the pharmacist and referred patient. This is in lieu of paper consent due to COVID-19 precautions and the use of remote/virtual visits. Time spent with patient: Ignacio Crowe PharmD, [facility]   Jasmeet Floriangerson Garcia Bailey Ph: [phone]  Melanie Ayala Ph: [phone]  5/13/2021 8:05 AM    For Pharmacy Admin Tracking Only     CPA in place:   Yes   Recommendation Provided To: Patient/Caregiver: 4 via Virtual Visit and Pharmacy: 3   Intervention Detail: Device Training, Discontinued Rx: 1, reason: Unnecessary Med, Lab(s) Ordered, New Rx: 1, reason: Needs Additional Therapy, Patient Access Assistance/Sample Provided and Refill(s) Provided  Start trulicity, device training, refill Farxiga, stop glyburide, called 2 pharmacies and 1 insurance   Gap Closed?: No    Total # of Interventions Recommended: 7   Total # of Interventions Accepted: 7   Intervention Accepted By: Patient/Caregiver: 4 and Pharmacy: 3   Time Spent (min): 120

## 2021-05-18 ENCOUNTER — TELEPHONE (OUTPATIENT)
Dept: PHARMACY | Age: 56
End: 2021-05-18

## 2021-05-18 DIAGNOSIS — E11.9 TYPE 2 DIABETES MELLITUS WITHOUT COMPLICATION, WITHOUT LONG-TERM CURRENT USE OF INSULIN (HCC): ICD-10-CM

## 2021-05-18 LAB
ANION GAP SERPL CALCULATED.3IONS-SCNC: 10 MMOL/L (ref 3–16)
BUN BLDV-MCNC: 15 MG/DL (ref 7–20)
CALCIUM SERPL-MCNC: 9.2 MG/DL (ref 8.3–10.6)
CHLORIDE BLD-SCNC: 102 MMOL/L (ref 99–110)
CO2: 26 MMOL/L (ref 21–32)
CREAT SERPL-MCNC: 0.9 MG/DL (ref 0.9–1.3)
GFR AFRICAN AMERICAN: >60
GFR NON-AFRICAN AMERICAN: >60
GLUCOSE BLD-MCNC: 91 MG/DL (ref 70–99)
POTASSIUM SERPL-SCNC: 4.6 MMOL/L (ref 3.5–5.1)
SODIUM BLD-SCNC: 138 MMOL/L (ref 136–145)

## 2021-05-19 ENCOUNTER — VIRTUAL VISIT (OUTPATIENT)
Dept: PHARMACY | Age: 56
End: 2021-05-19
Payer: COMMERCIAL

## 2021-05-19 DIAGNOSIS — E11.9 TYPE 2 DIABETES MELLITUS WITHOUT COMPLICATION, WITHOUT LONG-TERM CURRENT USE OF INSULIN (HCC): Primary | ICD-10-CM

## 2021-05-19 LAB
ESTIMATED AVERAGE GLUCOSE: 182.9 MG/DL
HBA1C MFR BLD: 8 %

## 2021-05-19 PROCEDURE — 99211 OFF/OP EST MAY X REQ PHY/QHP: CPT | Performed by: PHARMACIST

## 2021-05-19 NOTE — PATIENT INSTRUCTIONS
- Limit fruit intake to 1 serving per day and maintain low carb diet. - Continue current diabetes medications.

## 2021-05-19 NOTE — PROGRESS NOTES
Disclaimer for Virtual Visits: We want to confirm that, for purposes of billing, this is a virtual visit with your provider for which we will submit a claim for reimbursement with your insurance company. You may be responsible for any copays, coinsurance amounts or other amounts not covered by your insurance company. If you do not accept this, unfortunately we will not be able to schedule a virtual visit with the provider. Do you accept? Yes. CLINICAL PHARMACY NOTEDiabetes    Hillary Eisenmenger is a 63 y.o. male with PMHX significant for HTN, HLD and Type 2 Diabetes referred by Dr Hever Snider for diabetes counseling and medication management. Past Medical History:   Diagnosis Date    Depression     Erectile dysfunction     HLD (hyperlipidemia)     Hypertension     Type II diabetes mellitus (HCC)      Treatment Adherence:  Diet: Hot chocolate and glazed donut qAM; switched from hot chocolate to hot tea with 2 peppermints (doesn't like coffee). Only eats when hungry. Eats donut to just to have something in stomach before work. Has lunch at work and light dinner. Doesn't like to waste food. Loves raw veggies and spinach salads, but doesn't eat often. Used to juice with beets, kale, spinach, carrots, apple, but someone told him it was high in sugar. Eats large bakery pretzel once per week. Trying to cut out fried foods. Drinks Red Bull 1x per month. Likes sweet tea, but does not drink often. Doesn't eat \"sugary stuff. \" Water \"unlimited\" Yeti 2-3 per day. Metamucil 1x/week   3/30/21: Stopped Red Bull and hot chocolate with donut, replaced with black tea with 2 peppermints and rice cake; reports 'no sugar in the house.' Drowsy from new antidepressant so sometimes sleeps through dinner. Still hydrating with lots of water every day.   4/15/21: Drank only 1 pop in 2 weeks, a lot of water, black tea qAM with peppermint, ice cream and cake over the weekend for brother's bday, but otherwise continues with positive lifestyle changes. Only eats when he is hungry. Tries to eat earlier dinner, not right before bed.    5/13/21: Hot chocolate only once b/c he was given some for free. Otherwise he has been really consistent with lifestyle changes. Treated himself to one drink on Mother's day with his sister. 5/19/21: Has been compliant with diet changes - no donuts / hot chocolate in past month. Eats a lot of fruit, mostly for breakfast. Tolerating Trulicity, states it is decreasing his appetite. Exercise: job is strenuous- works long hours in [employer] and is affecting his mental health (looking for different job), cannot go to gym 2/2 COVID. Used to [place] at the gym\" and enjoys working out. Weight trend:   4/15/21: home weight is 240 lbs! Lost ~15 lbs since Jan; hasn't weighed since April 5/19: weight now at 249 lbs    Barriers: cost of meds, COVID restrictions. He was able to get Trulicity and Lamar from pharmacy with no issues after long discussion with pharmacy/insurance last visit. Now has to get medications fro [facility] as they are the preferred pharmacy from his insurance. Type 2 Diabetes Mellitus  Year diagnosed pre-2015. Related hospitalizations/ED visits include: none  Current symptoms/problems include neuropathy, polyuria and polydipsia. Episodes of hypoglycemia? no  Eye exam current (within one year): no 2019, lost job 2020, but plans to get checked soon. Tobacco history: He  reports that he has never smoked. He has never used smokeless tobacco.   Current diabetes medications: Metformin 1000mg BID, Farxiga 5 mg daily, Trulicity 7.39UI weekly   Compliance: compliant most of the time. Side effects: none. Some decreased appetite since starting Trulicity. Previous treatment regimens / response: Invokanamet and Janumet, all d/c due to cost. Glyburide d/c'd and replaced with Trulicity.      Home blood sugar records:   4/15/21 FBG 75-79, bedtime 115-127  5/13/21 reports FBG consistently in 70s,  bedtime 115-120  5/19/21 reports FBG this AM as 77, bedtime was 120    Hypertension:    Patient denies chest pain, shortness of breath, headache, lightheadedness, blurred vision, peripheral edema, palpitations, dry cough and fatigue. He does not usually add salt to foods. Uses Mrs Cata Doshi. Does not eat out. Tries to eat fresh   On ARB--losartan 100  Medication side effects: no medication side effects noted. Use of agents associated with hypertension: NSAIDS. Caffeine use: 1c tea qAM  Home blood pressure monitoring: No.      Hyperlipidemia:  He is adherent to a low saturated fat/cholesterol diet  On moderate intensity statin-- pravastatin 40  No Aspirin  No new myalgias or GI upset on pravastatin (Pravachol). Cardiovascular risk factors: advanced age (older than 54 for men, 72 for women), diabetes mellitus, dyslipidemia, family history of premature cardiovascular disease, hypertension, male gender and obesity (BMI >= 30 kg/m2)     Depression:   Patient reports depression related to job. Still looking for new job. Hasn't made it to [employer] yet--too tired. Not motivated to do things for himself like go to [employer] or see his friends. Just wants to sleep all day when he is off work. Feels okay on Saturdays (catches up on sleep), but very depressed on Sundays, as he knows he will be going back to work on Monday. He thinks citalopram is working better, but still makes him drowsy. He will continue for now and talk to PCP in July if he wants to try an alternative. Current medication: Celexa 10 mg daily started taking daily as directed on 3/30/21. Compliance: good, previously was only taking prn  Side effects: Drowsiness (takes at bedtime)   Pt reports dx of sleep apnea, but hasn't had sleep study. States this was not covered by insurance in the past. He wants to know if Sunosi is a medication option for him.      Social History     Tobacco Use    Smoking status: Never Smoker    Smokeless tobacco: Never Used 5    losartan (COZAAR) 100 MG tablet TAKE 1 TABLET BY MOUTH EVERY DAY 30 tablet 5    pravastatin (PRAVACHOL) 40 MG tablet Take 1 tablet by mouth daily 90 tablet 1    Blood Glucose Monitoring Suppl (ONETOUCH VERIO) w/Device KIT Use to check blood sugar BID before meals or as needed. 1 kit 0    blood glucose test strips (ONETOUCH VERIO) strip Use to check blood sugar BID before meals or as needed. 200 each 5    Lancets MISC Use to check blood sugar BID before meals or as needed. 200 each 5    glyBURIDE (DIABETA) 5 MG tablet Take 1 tablet by mouth twice daily with a meal 180 tablet 1    amLODIPine (NORVASC) 10 MG tablet TAKE 1 TABLET BY MOUTH EVERY DAY 90 tablet 3    alprostadil, Vasodilator, (CAVERJECT) 20 MCG SOLR intracavernosal injection 20 mcg by Intracavernosal route       No current facility-administered medications on file prior to visit. Medication list reviewed and updated. The 10-year ASCVD risk score (Vidhi Morrison, et al., 2013) is: 22.7%    Values used to calculate the score:      Age: 63 years      Sex: Male      Is Non- : Yes      Diabetic: Yes      Tobacco smoker: No      Systolic Blood Pressure: 218 mmHg      Is BP treated: Yes      HDL Cholesterol: 44 mg/dL      Total Cholesterol: 156 mg/dL    Assessment/Plan:   1. Type 2 diabetes mellitus without complication, without long-term current use of insulin (HCC)  A1c decreased significantly since last check (11.8 1/29/21 -> 8.0 5/18/21). A1c remains above goal, but current blood sugar levels are at goal. Anticipate A1c will continue to decline with recent addition of Trulicity.   -Continue metformin 1g BID, farixga 5 mg qd, and Trulicity 5.75 mg weekly. Will titrate Trulicity as tolerated. Will send 47BF of Trulicity next appointment after possibly increasing dose. -Due for eye exam  -Due for pneumonia vaccine, will address at future visit.     2. Essential hypertension  Last BP slightly above goal <130/80, but appears to normally be at goal in office. SGTL2 will help reduce BP  No albuminuria   On ARB     3. Pure hypercholesterolemia  On moderate intensity statin-- pravastatin 40 mg  Based on 10-yr ASCVD >20%, could consider high-intensity statin therapy   Consider addition of asa 81 mg after risk/benefit discussion, will address at future visit. .  Repeat FLP: annually    4. Depression  Compliant with Celexa, still suffers depression mostly as a result of his job. Pt thinks medication helps, but causes drowsiness. He would like to continue and will speak with PCP in July if he wants to try different SSRI. Recommend asking PCP about sleep study referral.   Goals: Dedicate at least 1 hour each week to doing something you enjoy ([employer], time with friends or sister)     Health Maintenance Due   Topic Date Due    Hepatitis C screen  Never done    Pneumococcal 0-64 years Vaccine (1 of 2 - PPSV23) Never done    Diabetic retinal exam  Never done    COVID-19 Vaccine (1) Never done    HIV screen  Never done    Hepatitis B vaccine (1 of 3 - Risk 3-dose series) Never done    Shingles Vaccine (1 of 2) Never done    Colon cancer screen colonoscopy  Never done     Education provided:  Discussed general issues about diabetes pathophysiology and management. Reviewed A1c and blood sugar goals with patient  Counseled on changes to medication regimen and common side effects  Reviewed meter instruction technique and importance of checking BG twice a day before breakfast and 2 hr after meals. Provided BG log and instructed patient to bring to f/u appointment. Discussed symptoms and management of hypoglycemic episodes. Encouraged weight loss  Encouraged aerobic exercise   Educated on diet (low carb/sugar, DASH, low cholesterol)    Follow up: 3 wks    Discussed with patient the Pharmacist Collaborative Practice Agreement.   Patient provided verbal and/or electronic (ex. trippiecehart) consent to participate in the collaborative practice agreement between the pharmacist and referred patient. This is in lieu of paper consent due to COVID-19 precautions and the use of remote/virtual visits. Time spent with patient: [address], [place], BCPS  [facility]: [address]: [phone]  5/19/2021 8:50 AM   For Pharmacy Admin Tracking Only     CPA in place:   Yes   Gap Closed?: No    Total # of Interventions Recommended: 0   Total # of Interventions Accepted: 0   Time Spent (min): 30

## 2021-06-08 ENCOUNTER — VIRTUAL VISIT (OUTPATIENT)
Dept: PHARMACY | Age: 56
End: 2021-06-08
Payer: COMMERCIAL

## 2021-06-08 DIAGNOSIS — E11.9 TYPE 2 DIABETES MELLITUS WITHOUT COMPLICATION, WITHOUT LONG-TERM CURRENT USE OF INSULIN (HCC): Primary | ICD-10-CM

## 2021-06-08 PROCEDURE — 99211 OFF/OP EST MAY X REQ PHY/QHP: CPT

## 2021-06-08 PROCEDURE — 99212 OFFICE O/P EST SF 10 MIN: CPT

## 2021-06-08 RX ORDER — DULAGLUTIDE 0.75 MG/.5ML
0.75 INJECTION, SOLUTION SUBCUTANEOUS WEEKLY
Qty: 12 PEN | Refills: 1 | Status: SHIPPED | OUTPATIENT
Start: 2021-06-08 | End: 2021-11-22 | Stop reason: SDUPTHER

## 2021-06-08 NOTE — Clinical Note
Hello! Are you able to check POC a1c at his OV on 7/23/21? I'm hoping he's at goal based on his current BG readings, but will plan to increase Trulicity dose if not at goal by then. Thanks!

## 2021-06-08 NOTE — PROGRESS NOTES
Disclaimer for Virtual Visits: We want to confirm that, for purposes of billing, this is a virtual visit with your provider for which we will submit a claim for reimbursement with your insurance company. You may be responsible for any copays, coinsurance amounts or other amounts not covered by your insurance company. If you do not accept this, unfortunately we will not be able to schedule a virtual visit with the provider. Do you accept? Yes. CLINICAL PHARMACY NOTEDiabetes    Dominick Harvey is a 63 y.o. male with PMHX significant for HTN, HLD and Type 2 Diabetes referred by Dr Henrietta Rizvi for diabetes counseling and medication management. Past Medical History:   Diagnosis Date    Depression     Erectile dysfunction     HLD (hyperlipidemia)     Hypertension     Type II diabetes mellitus (HCC)      Treatment Adherence:  Diet: Hot chocolate and glazed donut qAM; switched from hot chocolate to hot tea with 2 peppermints (doesn't like coffee). Only eats when hungry. Eats donut to just to have something in stomach before work. Has lunch at work and light dinner. Doesn't like to waste food. Loves raw veggies and spinach salads, but doesn't eat often. Used to juice with beets, kale, spinach, carrots, apple, but someone told him it was high in sugar. Eats large bakery pretzel once per week. Trying to cut out fried foods. Drinks Red Bull 1x per month. Likes sweet tea, but does not drink often. Doesn't eat \"sugary stuff. \" Water \"unlimited\" Yeti 2-3 per day. Metamucil 1x/week   3/30/21: Stopped Red Bull and hot chocolate with donut, replaced with black tea with 2 peppermints and rice cake; reports 'no sugar in the house.' Drowsy from new antidepressant so sometimes sleeps through dinner. Still hydrating with lots of water every day.   4/15/21: Drank only 1 pop in 2 weeks, a lot of water, black tea qAM with peppermint, ice cream and cake over the weekend for brother's bday, but otherwise continues with positive lifestyle changes. Only eats when he is hungry. Tries to eat earlier dinner, not right before bed.    5/13/21: Hot chocolate only once b/c he was given some for free. Otherwise he has been really consistent with lifestyle changes. Treated himself to one drink on Mother's day with his sister. 5/19/21: Has been compliant with diet changes - no donuts / hot chocolate in past month. Eats a lot of fruit, mostly for breakfast. Tolerating Trulicity, states it is decreasing his appetite. 6/8/21: giant root beer float last week, otherwise compliant with diet. Exercise: job is strenuous- works long hours in [employer] and is affecting his mental health (looking for different job), cannot go to gym 2/2 COVID. Used to [place] at the gym\" and enjoys working out. His goal for this summer is to start working out at the gym again. Weight trend:   4/15/21: home weight is 240 lbs! Lost ~15 lbs since Jan; hasn't weighed since April 6/8/21: wt 249 lbs    Barriers: cost of meds, COVID restrictions. He was able to get Trulicity and Mazon from pharmacy with no issues after long discussion with pharmacy/insurance last visit. Now has to get medications fro [facility] as they are the preferred pharmacy for his insurance. Type 2 Diabetes Mellitus  Year diagnosed pre-2015. Related hospitalizations/ED visits include: none  Current symptoms/problems include neuropathy, polyuria and polydipsia. Episodes of hypoglycemia? no  Eye exam current (within one year): no 2019, lost job 2020, but plans to get checked soon. Tobacco history: He  reports that he has never smoked. He has never used smokeless tobacco.   Current diabetes medications: Metformin 1000mg BID, Farxiga 5 mg daily, Trulicity 7.63RG weekly   Compliance: compliant most of the time. Side effects: none. Some decreased appetite since starting Trulicity. Previous treatment regimens / response:  Invokanamet and Janumet, all d/c due to cost. Glyburide d/c'd and effects: Drowsiness (takes at bedtime)   Pt reports dx of sleep apnea, but hasn't had sleep study. States this was not covered by insurance in the past.     Social History     Tobacco Use    Smoking status: Never Smoker    Smokeless tobacco: Never Used   Substance Use Topics    Alcohol use: Yes        Physical Exam  There were no vitals taken for this visit. There is no height or weight on file to calculate BMI.   Wt Readings from Last 3 Encounters:   01/08/21 255 lb 9.6 oz (115.9 kg)   09/29/20 253 lb 12.8 oz (115.1 kg)   09/18/18 261 lb (118.4 kg)      BP Readings from Last 3 Encounters:   01/08/21 138/72   09/29/20 124/70   09/18/18 120/74        Pertinent Labs:     Lab Results   Component Value Date    LABA1C 8.0 05/18/2021    LABA1C 11.8 01/29/2021    LABA1C 8.7 09/29/2020      Lab Results   Component Value Date    MALBCR 6.5 01/29/2021    LABMICR 1.30 01/29/2021     No results found for: CRCLEARANCE   Lab Results   Component Value Date    LDLCALC 95 01/29/2021    CHOL 156 01/29/2021    TRIG 84 01/29/2021    HDL 44 01/29/2021     Lab Results   Component Value Date    CREATININE 0.9 05/18/2021    BUN 15 05/18/2021     05/18/2021    K 4.6 05/18/2021     05/18/2021    CO2 26 05/18/2021     Lab Results   Component Value Date    AST 20 04/25/2020    ALT 25 04/25/2020    BILITOT 0.4 04/25/2020    ALKPHOS 131 (H) 04/25/2020     No components found for: VITB12  No results found for: TSH     Allergies   Allergen Reactions    Fish-Derived Products Hives and Shortness Of Breath    Other Hives and Shortness Of Breath     Immunization History   Administered Date(s) Administered    Tdap (Boostrix, Adacel) 10/20/2015     Current Outpatient Medications on File Prior to Visit   Medication Sig Dispense Refill    dapagliflozin (FARXIGA) 5 MG tablet TAKE 1 TABLET BY MOUTH EVERY MORNING 90 tablet 1    Dulaglutide (TRULICITY) 4.03 IY/1.0ZB SOPN Inject 0.75 mg into the skin once a week 4 pen 0    citalopram (CELEXA) 10 MG tablet TAKE 1 TABLET BY MOUTH EVERY DAY 30 tablet 3    naproxen (NAPROSYN) 250 MG tablet TAKE 1 TABLET BY MOUTH TWICE A DAY WITH MEALS 60 tablet 5    metFORMIN (GLUCOPHAGE) 1000 MG tablet TAKE 1 TABLET BY MOUTH TWICE A DAY WITH MEALS 60 tablet 5    losartan (COZAAR) 100 MG tablet TAKE 1 TABLET BY MOUTH EVERY DAY 30 tablet 5    pravastatin (PRAVACHOL) 40 MG tablet Take 1 tablet by mouth daily 90 tablet 1    Blood Glucose Monitoring Suppl (ONETOUCH VERIO) w/Device KIT Use to check blood sugar BID before meals or as needed. 1 kit 0    blood glucose test strips (ONETOUCH VERIO) strip Use to check blood sugar BID before meals or as needed. 200 each 5    Lancets MISC Use to check blood sugar BID before meals or as needed. 200 each 5    amLODIPine (NORVASC) 10 MG tablet TAKE 1 TABLET BY MOUTH EVERY DAY 90 tablet 3    alprostadil, Vasodilator, (CAVERJECT) 20 MCG SOLR intracavernosal injection 20 mcg by Intracavernosal route       No current facility-administered medications on file prior to visit. Medication list reviewed and updated. The 10-year ASCVD risk score (Lesley Ortiz, et al., 2013) is: 22.7%    Values used to calculate the score:      Age: 63 years      Sex: Male      Is Non- : Yes      Diabetic: Yes      Tobacco smoker: No      Systolic Blood Pressure: 334 mmHg      Is BP treated: Yes      HDL Cholesterol: 44 mg/dL      Total Cholesterol: 156 mg/dL    Assessment/Plan:   1. Type 2 diabetes mellitus without complication, without long-term current use of insulin ([facility])  A1c trending down (11.8 1/29/21 -> 8.0 5/18/21). A1c remains above goal, but current blood sugar levels are at goal. Anticipate A1c will continue to decline with recent addition of Trulicity.   -Continue metformin 1g BID, farixga 5 mg qd, and Trulicity 1.30 mg weekly. -Repeat A1c at upcoming PCP visit 7/23.  Will increase Trulicity to 1.5 mg if next A1c remains above goal.   -Sent 90ds refill low cholesterol)    Follow up: 6 wks with pcp, 3 months in clinic    Discussed with patient the Pharmacist Collaborative Practice Agreement. Patient provided verbal and/or electronic (ex. mychart) consent to participate in the collaborative practice agreement between the pharmacist and referred patient. This is in lieu of paper consent due to COVID-19 precautions and the use of remote/virtual visits. Time spent with patient: 30 mins    Coral Jacome PharmD, [facility]  Medication Management Clinic   [address] Ph: [phone]  Jarrett Piña Ph: [phone]  6/8/2021 8:20 AM      For Pharmacy [id number] [place] [address] in place:   Yes   Recommendation Provided To: Patient/Caregiver: 2 via Virtual Visit   Intervention Detail: Lab(s) Ordered and Refill(s) Provided   Gap Closed?: No    Total # of Interventions Recommended: 2   Total # of Interventions Accepted: 2   Intervention Accepted By: Patient/Caregiver: 2   Time Spent (min): 30

## 2021-06-14 DIAGNOSIS — M75.01 ADHESIVE CAPSULITIS OF RIGHT SHOULDER: ICD-10-CM

## 2021-06-14 DIAGNOSIS — Z13.31 POSITIVE DEPRESSION SCREENING: ICD-10-CM

## 2021-06-14 DIAGNOSIS — E11.9 TYPE 2 DIABETES MELLITUS WITHOUT COMPLICATION, WITHOUT LONG-TERM CURRENT USE OF INSULIN (HCC): ICD-10-CM

## 2021-06-14 DIAGNOSIS — F32.A DEPRESSIVE DISORDER: ICD-10-CM

## 2021-06-14 NOTE — TELEPHONE ENCOUNTER
Pt called and needs to switch his medications to [facility] for insurance reasons. Ynesdionisio did contact [facility] to have RXs transferred, but they were told he has no active RXs on file. Pt already gets [facility] and [place] at [facility], and is requesting to transfer all remaining pended RXs.      Last PCP OV 2/4/21  Next PCP OV 7/16/21    Last Med Mgmt visit 6/8/21  Next Med Mgmt visit 11/9/21    Miah Crum, PharmD, [facility]  Medication Management Clinic   [facility] [address] Ph: [phone]  [facility] Ph: [phone]  6/14/2021 10:02 AM

## 2021-06-16 RX ORDER — PRAVASTATIN SODIUM 40 MG
40 TABLET ORAL DAILY
Qty: 90 TABLET | Refills: 1 | Status: SHIPPED | OUTPATIENT
Start: 2021-06-16 | End: 2021-11-28

## 2021-06-16 RX ORDER — NAPROXEN 250 MG/1
TABLET ORAL
Qty: 60 TABLET | Refills: 1 | Status: SHIPPED | OUTPATIENT
Start: 2021-06-16 | End: 2021-08-16

## 2021-06-16 RX ORDER — BLOOD SUGAR DIAGNOSTIC
STRIP MISCELLANEOUS
Qty: 200 EACH | Refills: 11 | Status: SHIPPED | OUTPATIENT
Start: 2021-06-16

## 2021-06-16 RX ORDER — AMLODIPINE BESYLATE 10 MG/1
TABLET ORAL
Qty: 90 TABLET | Refills: 1 | Status: SHIPPED | OUTPATIENT
Start: 2021-06-16 | End: 2021-11-28

## 2021-06-16 RX ORDER — LANCETS 30 GAUGE
EACH MISCELLANEOUS
Qty: 200 EACH | Refills: 11 | Status: SHIPPED | OUTPATIENT
Start: 2021-06-16

## 2021-06-16 RX ORDER — LOSARTAN POTASSIUM 100 MG/1
TABLET ORAL
Qty: 90 TABLET | Refills: 1 | Status: SHIPPED | OUTPATIENT
Start: 2021-06-16 | End: 2021-11-28

## 2021-06-16 RX ORDER — CITALOPRAM 10 MG/1
TABLET ORAL
Qty: 90 TABLET | Refills: 1 | Status: SHIPPED | OUTPATIENT
Start: 2021-06-16 | End: 2021-07-23 | Stop reason: SINTOL

## 2021-07-23 ENCOUNTER — OFFICE VISIT (OUTPATIENT)
Dept: PRIMARY CARE CLINIC | Age: 56
End: 2021-07-23
Payer: COMMERCIAL

## 2021-07-23 ENCOUNTER — TELEPHONE (OUTPATIENT)
Dept: PRIMARY CARE CLINIC | Age: 56
End: 2021-07-23

## 2021-07-23 VITALS
HEART RATE: 94 BPM | WEIGHT: 253.4 LBS | BODY MASS INDEX: 39.77 KG/M2 | SYSTOLIC BLOOD PRESSURE: 143 MMHG | OXYGEN SATURATION: 100 % | TEMPERATURE: 98.1 F | DIASTOLIC BLOOD PRESSURE: 76 MMHG | HEIGHT: 67 IN

## 2021-07-23 DIAGNOSIS — Z00.00 ROUTINE PHYSICAL EXAMINATION: Primary | ICD-10-CM

## 2021-07-23 DIAGNOSIS — F32.A DEPRESSIVE DISORDER: ICD-10-CM

## 2021-07-23 DIAGNOSIS — E78.2 MIXED HYPERLIPIDEMIA: ICD-10-CM

## 2021-07-23 DIAGNOSIS — E11.9 TYPE 2 DIABETES MELLITUS WITHOUT COMPLICATION, WITHOUT LONG-TERM CURRENT USE OF INSULIN (HCC): ICD-10-CM

## 2021-07-23 DIAGNOSIS — I10 ESSENTIAL HYPERTENSION: ICD-10-CM

## 2021-07-23 DIAGNOSIS — R40.0 DAYTIME SOMNOLENCE: ICD-10-CM

## 2021-07-23 LAB — HBA1C MFR BLD: 6.2 %

## 2021-07-23 PROCEDURE — 99396 PREV VISIT EST AGE 40-64: CPT | Performed by: FAMILY MEDICINE

## 2021-07-23 PROCEDURE — 83036 HEMOGLOBIN GLYCOSYLATED A1C: CPT | Performed by: FAMILY MEDICINE

## 2021-07-23 RX ORDER — SERTRALINE HYDROCHLORIDE 25 MG/1
25 TABLET, FILM COATED ORAL DAILY
Qty: 30 TABLET | Refills: 3 | Status: SHIPPED | OUTPATIENT
Start: 2021-07-23 | End: 2021-11-18 | Stop reason: SINTOL

## 2021-07-23 NOTE — PATIENT INSTRUCTIONS
SCHEDULE COLONOSCOPY WITH [facility]    REFERRAL PROVIDED TO MEET WITH SLEEP SPECIALIST TO DISCUSS SLEEP STUDY     STOP CITALOPRAM. START SERTRALINE (ZOLOFT) INSTEAD    SCHEDULE YEARLY EYE EXAM

## 2021-07-23 NOTE — TELEPHONE ENCOUNTER
ERROR PHYSICAL THERAPY HIP EVALUATION - INPATIENT     Room Number: 415/415-A  Evaluation Date: 1/24/2017  Type of Evaluation: Initial  Physician Order: PT Eval and Treat    Presenting Problem: left nila  Reason for Therapy: Mobility Dysfunction and Discharge Plan Normal  Dynamic Sitting: Normal  Static Standing: Good  Dynamic Standing: Good                                           AM-PAC '6-Clicks' INPATIENT SHORT FORM - BASIC MOBILITY  How much difficulty does the patient currently have. ..  -   Turning over in be impairments manifest themselves as functional limitations in impaired mobility. The patient is below her baseline and would benefit from skilled inpatient PT to address the above deficits to assist patient in returning to prior level of function.     [address] [place]

## 2021-07-23 NOTE — PROGRESS NOTES
[address] PRIMARY CARE  [address] [facility] [address]  Dept: [phone]  Dept Fax: [phone]     7/23/2021      Amanda Sutton   1965     Chief Complaint   Patient presents with    Annual Exam       HPI  Pt comes in today for routine physical.     T2DM: Has been seeing Dr. Joce Petty for Med mgmt, education. A1c improved from 11.8% to 8.0%. Requested POC a1c today. No hypoglycemia. LDL <100 with pravastatin 40mg. Patient has been on citalopram for depression. Reported at diabetic visit increase in sedation with this medication. Taking at night and finds it hard to wake up in the morning. Has been taking 1/2 tablet which has helped the somnolence, but depression is worse. Would like to discuss alternative med option. With daytime somnolence was recommended to have sleep study as well. Has been long suspected to have sleep apnea, but has previously been unable to get sleep study approved by insurance. Overdue for colonoscopy. Last done at age 38. Was supposed to see [address] but got cancelled due to [place]. States he forgot but is ready to reschedule and has the number to do so. Due for eye exam, plans to schedule soon. Declines pneumovax and COVID vaccine. Prior to Visit Medications    Medication Sig Taking?  Authorizing Provider   citalopram (CELEXA) 10 MG tablet TAKE 1 TABLET BY MOUTH EVERY DAY Yes Tiffany García, DO   naproxen (NAPROSYN) 250 MG tablet TAKE 1 TABLET BY MOUTH TWICE A DAY WITH MEALS Yes Tiffany García DO   metFORMIN (GLUCOPHAGE) 1000 MG tablet TAKE 1 TABLET BY MOUTH TWICE A DAY WITH MEALS Yes Tiffany García DO   losartan (COZAAR) 100 MG tablet TAKE 1 TABLET BY MOUTH EVERY DAY Yes Tiffany García DO   amLODIPine (NORVASC) 10 MG tablet TAKE 1 TABLET BY MOUTH EVERY DAY Yes Tiffany García DO   Dulaglutide (TRULICITY) 3.79 WK/3.5BL SOPN Inject 0.75 mg into the skin once a week Yes Tiffany García, DO   dapagliflozin (FARXIGA) 5 MG tablet TAKE 1 TABLET BY MOUTH EVERY MORNING Yes Tiffany García DO   alprostadil, Vasodilator, (CAVERJECT) 20 MCG SOLR intracavernosal injection 20 mcg by Intracavernosal route Yes Historical Provider, MD   pravastatin (PRAVACHOL) 40 MG tablet Take 1 tablet by mouth daily  [address], DO   blood glucose test strips (ONETOUCH VERIO) strip Use to check blood sugar BID before meals or as needed. Tiffany García, DO   Lancets MISC Use to check blood sugar BID before meals or as needed. [address], DO   Blood Glucose Monitoring Suppl (Keiko Ding) w/Device KIT Use to check blood sugar BID before meals or as needed. [address], DO        Past Medical History:   Diagnosis Date    Depression     Erectile dysfunction     HLD (hyperlipidemia)     Hypertension     Type II diabetes mellitus ([facility] [address]         Social History     Tobacco Use    Smoking status: Never Smoker    Smokeless tobacco: Never Used   Substance Use Topics    Alcohol use: Yes    Drug use: No        Past Surgical History:   Procedure Laterality Date    MOUTH SURGERY          Allergies   Allergen Reactions    Fish-Derived Products Hives and Shortness Of Breath    Other Hives and Shortness Of Breath        Family History   Problem Relation Age of Onset    Heart Failure Mother     Heart Disease Father         Patient's past medical history, surgical history, family history, medications, and allergies  were all reviewed and updated as appropriate today. Review of Systems   Constitutional: Negative for chills, fever and unexpected weight change. Respiratory: Negative for cough and shortness of breath. Cardiovascular: Negative for chest pain, palpitations and leg swelling. Gastrointestinal: Negative for abdominal pain, diarrhea, nausea and vomiting. Endocrine: Negative for polydipsia, polyphagia and polyuria.

## 2021-07-26 ASSESSMENT — ENCOUNTER SYMPTOMS
VOMITING: 0
DIARRHEA: 0
SHORTNESS OF BREATH: 0
COUGH: 0
ABDOMINAL PAIN: 0
NAUSEA: 0

## 2021-08-15 DIAGNOSIS — M75.01 ADHESIVE CAPSULITIS OF RIGHT SHOULDER: ICD-10-CM

## 2021-08-16 RX ORDER — NAPROXEN 250 MG/1
TABLET ORAL
Qty: 60 TABLET | Refills: 1 | Status: SHIPPED | OUTPATIENT
Start: 2021-08-16 | End: 2021-10-15

## 2021-10-15 ENCOUNTER — OFFICE VISIT (OUTPATIENT)
Dept: PRIMARY CARE CLINIC | Age: 56
End: 2021-10-15
Payer: COMMERCIAL

## 2021-10-15 VITALS
DIASTOLIC BLOOD PRESSURE: 72 MMHG | SYSTOLIC BLOOD PRESSURE: 138 MMHG | HEIGHT: 67 IN | BODY MASS INDEX: 38.61 KG/M2 | HEART RATE: 105 BPM | OXYGEN SATURATION: 100 % | TEMPERATURE: 96.6 F | WEIGHT: 246 LBS

## 2021-10-15 DIAGNOSIS — I10 PRIMARY HYPERTENSION: ICD-10-CM

## 2021-10-15 DIAGNOSIS — M25.511 CHRONIC RIGHT SHOULDER PAIN: ICD-10-CM

## 2021-10-15 DIAGNOSIS — G89.29 CHRONIC RIGHT SHOULDER PAIN: ICD-10-CM

## 2021-10-15 DIAGNOSIS — E11.9 TYPE 2 DIABETES MELLITUS WITHOUT COMPLICATION, WITHOUT LONG-TERM CURRENT USE OF INSULIN (HCC): Primary | ICD-10-CM

## 2021-10-15 DIAGNOSIS — E78.2 MIXED HYPERLIPIDEMIA: ICD-10-CM

## 2021-10-15 LAB — HBA1C MFR BLD: 6 %

## 2021-10-15 PROCEDURE — 83036 HEMOGLOBIN GLYCOSYLATED A1C: CPT | Performed by: FAMILY MEDICINE

## 2021-10-15 PROCEDURE — 99214 OFFICE O/P EST MOD 30 MIN: CPT | Performed by: FAMILY MEDICINE

## 2021-10-15 RX ORDER — GABAPENTIN 300 MG/1
300 CAPSULE ORAL NIGHTLY
Qty: 180 CAPSULE | Refills: 0 | Status: SHIPPED | OUTPATIENT
Start: 2021-10-15 | End: 2022-04-29 | Stop reason: SDUPTHER

## 2021-10-15 NOTE — PROGRESS NOTES
[address] PRIMARY CARE  [address] [place] [address]  Dept: [phone]  Dept Fax: [phone]     10/15/2021      Cammy Garnett   1965     Chief Complaint   Patient presents with    Diabetes       HPI  Pt comes in today for follow up DMII. Last a1c 7/23/21 at 6.2%. Doing much better. Has been watching his diet. Readings have stable. No hypoglycemia. Was told by insurance that Colonoscopy is not covered. Last colonoscopy age 38. Overdue. Unsure of what colon cancer screening is covered. Reports naproxen causing upset stomach. Was taking for arthritis pain. Has taken gabapentin in the past and would like to restart. Wt Readings from Last 5 Encounters:   10/15/21 246 lb (111.6 kg)   07/23/21 253 lb 6.4 oz (114.9 kg)   01/08/21 255 lb 9.6 oz (115.9 kg)   09/29/20 253 lb 12.8 oz (115.1 kg)   09/18/18 261 lb (118.4 kg)      BP Readings from Last 5 Encounters:   10/15/21 138/72   07/23/21 (!) 143/76   01/08/21 138/72   09/29/20 124/70   09/18/18 120/74      Hemoglobin A1C   Date Value Ref Range Status   10/15/2021 6.0 % Final       Prior to Visit Medications    Medication Sig Taking?  Authorizing Provider   sertraline (ZOLOFT) 25 MG tablet Take 1 tablet by mouth daily Yes Tiffany García DO   metFORMIN (GLUCOPHAGE) 1000 MG tablet TAKE 1 TABLET BY MOUTH TWICE A DAY WITH MEALS Yes Tiffany García DO   losartan (COZAAR) 100 MG tablet TAKE 1 TABLET BY MOUTH EVERY DAY Yes Tiffany García DO   pravastatin (PRAVACHOL) 40 MG tablet Take 1 tablet by mouth daily Yes Tiffany García DO   amLODIPine (NORVASC) 10 MG tablet TAKE 1 TABLET BY MOUTH EVERY DAY Yes Tiffany García DO   Dulaglutide (TRULICITY) 6.88 NR/6.9ZL SOPN Inject 0.75 mg into the skin once a week Yes Tiffany García DO   dapagliflozin (FARXIGA) 5 MG tablet TAKE 1 TABLET BY MOUTH EVERY MORNING Yes Tiffany García, DO   alprostadil, Vasodilator, (CAVERJECT) 20 MCG SOLR intracavernosal injection 20 mcg by Intracavernosal route Yes Historical Provider, MD   blood glucose test strips (ONETOUCH VERIO) strip Use to check blood sugar BID before meals or as needed. Tiffany García, DO   Lancets MISC Use to check blood sugar BID before meals or as needed. Willis Hinojosa DO   Blood Glucose Monitoring Suppl ([place] Number) w/Device KIT Use to check blood sugar BID before meals or as needed. Willis Hinojosa DO        Past Medical History:   Diagnosis Date    Depression     Erectile dysfunction     HLD (hyperlipidemia)     Hypertension     Type II diabetes mellitus ([facility] [address]         Social History     Tobacco Use    Smoking status: Never Smoker    Smokeless tobacco: Never Used   Substance Use Topics    Alcohol use: Yes    Drug use: No        Past Surgical History:   Procedure Laterality Date    MOUTH SURGERY          Allergies   Allergen Reactions    Fish-Derived Products Hives and Shortness Of Breath    Other Hives and Shortness Of Breath        Family History   Problem Relation Age of Onset    Heart Failure Mother     Heart Disease Father         Patient's past medical history, surgical history, family history, medications, and allergies  were all reviewed and updated as appropriate today. Review of Systems   Constitutional: Negative for chills, fever and unexpected weight change. Respiratory: Negative for cough and shortness of breath. Cardiovascular: Negative for chest pain, palpitations and leg swelling. Gastrointestinal: Negative for abdominal pain, diarrhea, nausea and vomiting. Musculoskeletal: Positive for arthralgias. Neurological: Negative for dizziness and syncope. Psychiatric/Behavioral: Negative for dysphoric mood.        /72 (Cuff Size: Large Adult)   Pulse 105   Temp 96.6 °F (35.9 °C) (Temporal)   Ht 5' 6.75\" (1.695 m)   Wt 246 lb (111.6 kg)   SpO2 100% Comment: room air BMI 38.82 kg/m²      Physical Exam  Vitals reviewed. Constitutional:       General: He is not in acute distress. Appearance: He is well-developed. HENT:      Head: Normocephalic and atraumatic. Eyes:      General: No scleral icterus. Conjunctiva/sclera: Conjunctivae normal.   Neck:      Thyroid: No thyromegaly. Cardiovascular:      Rate and Rhythm: Normal rate and regular rhythm. Heart sounds: No murmur heard. Pulmonary:      Effort: Pulmonary effort is normal. No respiratory distress. Breath sounds: Normal breath sounds. Abdominal:      General: Bowel sounds are normal. There is no distension. Palpations: Abdomen is soft. Tenderness: There is no abdominal tenderness. Musculoskeletal:      Cervical back: Neck supple. Right lower leg: No edema. Left lower leg: No edema. Lymphadenopathy:      Cervical: No cervical adenopathy. Skin:     General: Skin is warm and dry. Capillary Refill: Capillary refill takes less than 2 seconds. Neurological:      General: No focal deficit present. Mental Status: He is alert and oriented to person, place, and time. Psychiatric:         Mood and Affect: Mood normal.         Assessment:  Encounter Diagnoses   Name Primary?  Type 2 diabetes mellitus without complication, without long-term current use of insulin (HCC) Yes    Primary hypertension     Mixed hyperlipidemia     Chronic right shoulder pain        Plan:    - A1c = 6.0%. Doing exceptionally well with current management. Will continue close follow up in 3 months. - Stop Naproxen. Rx gabapentin. - Continue ADA diet + lifestyle modifications. - Declines vaccinations. - Due for colon cancer screening. Encouraged to talk with insurance to identify options as he was told colonoscopy is not covered. New Prescriptions    GABAPENTIN (NEURONTIN) 300 MG CAPSULE    Take 1 capsule by mouth nightly for 180 days.         Orders Placed This Encounter Procedures    POCT glycosylated hemoglobin (Hb A1C)        Return in about 3 months (around 1/15/2022) for Follow up DMII - fasting. 30 total minutes were spent in direct patient care including chart review, face-to-face consultation and documentation.      [address], DO

## 2021-10-18 ASSESSMENT — ENCOUNTER SYMPTOMS
COUGH: 0
VOMITING: 0
SHORTNESS OF BREATH: 0
ABDOMINAL PAIN: 0
NAUSEA: 0
DIARRHEA: 0

## 2021-11-08 NOTE — PROGRESS NOTES
Disclaimer for Virtual Visits: We want to confirm that, for purposes of billing, this is a virtual visit with your provider for which we will submit a claim for reimbursement with your insurance company. You may be responsible for any copays, coinsurance amounts or other amounts not covered by your insurance company. If you do not accept this, unfortunately we will not be able to schedule a virtual visit with the provider. Do you accept? Yes. CLINICAL PHARMACY NOTEDiabetes    Woo Stewart is a 61 y.o. male with PMHX significant for HTN, HLD and Type 2 Diabetes referred by Dr Shimon Palafox for diabetes counseling and medication management. Past Medical History:   Diagnosis Date    Depression     Erectile dysfunction     HLD (hyperlipidemia)     Hypertension     Type II diabetes mellitus (HCC)      Treatment Adherence:  Diet: Hot chocolate and glazed donut qAM; switched from hot chocolate to hot tea with 2 peppermints (doesn't like coffee). Only eats when hungry. Eats donut to just to have something in stomach before work. Has lunch at work and light dinner. Doesn't like to waste food. Loves raw veggies and spinach salads, but doesn't eat often. Used to juice with beets, kale, spinach, carrots, apple, but someone told him it was high in sugar. Eats large bakery pretzel once per week. Trying to cut out fried foods. Drinks Red Bull 1x per month. Likes sweet tea, but does not drink often. Doesn't eat \"sugary stuff. \" Water \"unlimited\" Yeti 2-3 per day. Metamucil 1x/week   3/30/21: Stopped Red Bull and hot chocolate with donut, replaced with black tea with 2 peppermints and rice cake; reports 'no sugar in the house.' Drowsy from new antidepressant so sometimes sleeps through dinner. Still hydrating with lots of water every day.   4/15/21: Drank only 1 pop in 2 weeks, a lot of water, black tea qAM with peppermint, ice cream and cake over the weekend for brother's bday, but otherwise continues with positive lifestyle changes. Only eats when he is hungry. Tries to eat earlier dinner, not right before bed.    5/13/21: Hot chocolate only once b/c he was given some for free. Otherwise he has been really consistent with lifestyle changes. Treated himself to one drink on Mother's day with his sister. 5/19/21: Has been compliant with diet changes - no donuts / hot chocolate in past month. Eats a lot of fruit, mostly for breakfast. Tolerating Trulicity, states it is decreasing his appetite. 6/8/21: giant root beer float last week, otherwise compliant with diet. 11/9/21: no major changes    Exercise: job is strenuous- works long hours in [employer] and is affecting his mental health (looking for different job), cannot go to gym 2/2 COVID. Used to [place] at the gym\" and enjoys working out. 11/9/21: Stopped gym due to depression/job, was going once per week. When he goes, he feels like he could run a marathon the next morning. Weight trend:   4/15/21: home weight is 240 lbs! Lost ~15 lbs since Jan; hasn't weighed since April 6/8/21: wt 249 lbs  11/9/21: wt 246 lbs    Barriers: cost of meds, COVID restrictions. He was able to get Trulicity and [place] from pharmacy with no issues after long discussion with pharmacy/insurance last visit. Now has to get medications fro [facility] as they are the preferred pharmacy for his insurance. Type 2 Diabetes Mellitus  Year diagnosed pre-2015. Related hospitalizations/ED visits include: none  Current symptoms/problems include neuropathy, polyuria and polydipsia. Episodes of hypoglycemia? no  Eye exam current (within one year): no 2019, lost job 2020, but plans to get checked soon. Tobacco history: He  reports that he has never smoked. He has never used smokeless tobacco.   Current diabetes medications: Metformin 1000mg BID, Farxiga 5 mg daily, Trulicity 2.34VE weekly   Compliance: compliant most of the time. Side effects: none.  Some decreased appetite since starting Trulicity. Previous treatment regimens / response: Invokanamet and Janumet, all d/c due to cost. Glyburide d/c'd and replaced with Trulicity. Home blood sugar records:   4/15/21 FBG 75-79, bedtime 115-127  5/13/21 reports FBG consistently in 70s,  bedtime 115-120  5/19/21 reports FBG this AM as 77, bedtime was 120  6/8/21 FBG 74,  Bedtime 117- 120s  11/9/21 not checked last week due to depression, but usually at goal    Hypertension:    Patient denies chest pain, shortness of breath, headache, lightheadedness, blurred vision, peripheral edema, palpitations, dry cough and fatigue. He does not usually add salt to foods. Uses Mrs Naresh Daniels. Does not eat out. Tries to eat fresh   On ARB--losartan 100  Medication side effects: no medication side effects noted. Use of agents associated with hypertension: NSAIDS. Caffeine use: 1c tea qAM  Home blood pressure monitoring: No.      Hyperlipidemia:  He is adherent to a low saturated fat/cholesterol diet  On moderate intensity statin-- pravastatin 40  No Aspirin  No new myalgias or GI upset on pravastatin (Pravachol). Cardiovascular risk factors: advanced age (older than 54 for men, 72 for women), diabetes mellitus, dyslipidemia, family history of premature cardiovascular disease, hypertension, male gender and obesity (BMI >= 30 kg/m2)     Depression:   Patient reports depression related to job. Still looking for new job. Went to [place] once and \"didn't feel right. \" Trying to find time for himself on the weekends, but just wants to sleep all day when he is off work. He took a spontenous road trip with his brother 3 weeks ago which was the most fun he's had in a long time. Sees brother again next week. Saw sister on memorial day. Feels okay on Saturdays (catches up on sleep), but very depressed on Sundays, as he knows he will be going back to work on Monday. 11/9/21: recently went to sister's bday party and had a lot of fun.  Was supposed to get new job at [employer], but didn't get it, which made him very depressed. Current medication: sertraline   Compliance: good, previously was only taking prn  Side effects: Drowsiness (takes at bedtime) - willing to try different drug class  Pt reports dx of sleep apnea, but hasn't had sleep study. States this was not covered by insurance in the past. =    Social History     Tobacco Use    Smoking status: Never Smoker    Smokeless tobacco: Never Used   Substance Use Topics    Alcohol use: Yes      Physical Exam  There were no vitals taken for this visit. There is no height or weight on file to calculate BMI.   Wt Readings from Last 3 Encounters:   10/15/21 246 lb (111.6 kg)   07/23/21 253 lb 6.4 oz (114.9 kg)   01/08/21 255 lb 9.6 oz (115.9 kg)      BP Readings from Last 3 Encounters:   10/15/21 138/72   07/23/21 (!) 143/76   01/08/21 138/72        Pertinent Labs:     Lab Results   Component Value Date    LABA1C 6.0 10/15/2021    LABA1C 6.2 07/23/2021    LABA1C 8.0 05/18/2021      Lab Results   Component Value Date    MALBCR 6.5 01/29/2021    LABMICR 1.30 01/29/2021     No results found for: CRCLEARANCE   Lab Results   Component Value Date    LDLCALC 95 01/29/2021    CHOL 156 01/29/2021    TRIG 84 01/29/2021    HDL 44 01/29/2021     Lab Results   Component Value Date    CREATININE 0.9 05/18/2021    BUN 15 05/18/2021     05/18/2021    K 4.6 05/18/2021     05/18/2021    CO2 26 05/18/2021     Lab Results   Component Value Date    AST 20 04/25/2020    ALT 25 04/25/2020    BILITOT 0.4 04/25/2020    ALKPHOS 131 (H) 04/25/2020     No components found for: VITB12  No results found for: TSH     Allergies   Allergen Reactions    Fish-Derived Products Hives and Shortness Of Breath    Other Hives and Shortness Of Breath     Immunization History   Administered Date(s) Administered    Tdap (Boostrix, Adacel) 10/20/2015     Current Outpatient Medications on File Prior to Visit   Medication Sig Dispense Refill    dapagliflozin (FARXIGA) 5 MG tablet TAKE 1 TABLET BY MOUTH EVERY MORNING 90 tablet 1    gabapentin (NEURONTIN) 300 MG capsule Take 1 capsule by mouth nightly for 180 days. 180 capsule 0    sertraline (ZOLOFT) 25 MG tablet Take 1 tablet by mouth daily 30 tablet 3    metFORMIN (GLUCOPHAGE) 1000 MG tablet TAKE 1 TABLET BY MOUTH TWICE A DAY WITH MEALS 180 tablet 3    losartan (COZAAR) 100 MG tablet TAKE 1 TABLET BY MOUTH EVERY DAY 90 tablet 1    pravastatin (PRAVACHOL) 40 MG tablet Take 1 tablet by mouth daily 90 tablet 1    amLODIPine (NORVASC) 10 MG tablet TAKE 1 TABLET BY MOUTH EVERY DAY 90 tablet 1    blood glucose test strips (ONETOUCH VERIO) strip Use to check blood sugar BID before meals or as needed. 200 each 11    Lancets MISC Use to check blood sugar BID before meals or as needed. 200 each 11    Dulaglutide (TRULICITY) 8.10 TX/5.2NG SOPN Inject 0.75 mg into the skin once a week 12 pen 1    Blood Glucose Monitoring Suppl (ONETOUCH VERIO) w/Device KIT Use to check blood sugar BID before meals or as needed. 1 kit 0    alprostadil, Vasodilator, (CAVERJECT) 20 MCG SOLR intracavernosal injection 20 mcg by Intracavernosal route       No current facility-administered medications on file prior to visit. Medication list reviewed and updated. The 10-year ASCVD risk score (Tyler Councilman., et al., 2013) is: 23.6%    Values used to calculate the score:      Age: 61 years      Sex: Male      Is Non- : Yes      Diabetic: Yes      Tobacco smoker: No      Systolic Blood Pressure: 072 mmHg      Is BP treated: Yes      HDL Cholesterol: 44 mg/dL      Total Cholesterol: 156 mg/dL    Assessment/Plan:   1. Type 2 diabetes mellitus without complication, without long-term current use of insulin ([facility])  A1c at goal (11.8 1/29/21 -> 8.0-->6.2-->6.0%). -Continue metformin 1g BID, farixga 5 mg qd, and Trulicity 0.39 mg weekly.    -Due for eye exam, counseled  -Due for pneumonia vaccine, counseled  -Encouraged pt pharmacist and referred patient. This is in lieu of paper consent due to COVID-19 precautions and the use of remote/virtual visits. Time spent with patient: 45 mins    Logan Meza, PharmD, [facility]  Medication Management Clinic   [address] Ph: [phone]  Jaquelyn Seip Ph: [phone]  11/8/2021 9:57 AM      For Pharmacy [address] in place:   Yes   Recommendation Provided To: Provider: 2 via Note to Provider and Patient/Caregiver: 1 via Virtual Visit   Intervention Detail: Discontinued Rx: 1, reason: LORETO, New Rx: 1, reason: Needs Additional Therapy and Scheduled Appointment   Gap Closed?: Yes    Intervention Accepted By: Provider: 2 and Patient/Caregiver: 1   Time Spent (min): 45

## 2021-11-09 ENCOUNTER — VIRTUAL VISIT (OUTPATIENT)
Dept: PHARMACY | Age: 56
End: 2021-11-09
Payer: COMMERCIAL

## 2021-11-09 ENCOUNTER — TELEPHONE (OUTPATIENT)
Dept: PHARMACY | Age: 56
End: 2021-11-09

## 2021-11-09 DIAGNOSIS — E11.9 TYPE 2 DIABETES MELLITUS WITHOUT COMPLICATION, WITHOUT LONG-TERM CURRENT USE OF INSULIN (HCC): Primary | ICD-10-CM

## 2021-11-09 DIAGNOSIS — E78.2 MIXED HYPERLIPIDEMIA: ICD-10-CM

## 2021-11-09 DIAGNOSIS — I10 PRIMARY HYPERTENSION: ICD-10-CM

## 2021-11-09 PROCEDURE — 99213 OFFICE O/P EST LOW 20 MIN: CPT

## 2021-11-09 NOTE — TELEPHONE ENCOUNTER
Met with pt today for diabetes f/u visit. He is doing great as far as his diabetes is concerned; however he still struggles with  depression. The sertraline still makes him too drowsy. He also felt that the citalopram made him drowsy. He is willing to try an alternative. Do you think venlafaxine, bupropion, or something from another drug class might be an option? I told him I would send you a message, but that you may need to see him in the office. He is willing to schedule an OV to discuss if necessary.      Last OV 10/15/21  Next OV 1/14/21    Zen Campos, PharmD, [facility]  Medication Management Clinic   [place] [address] Ph: [phone]  [facility] Ph: [phone]  11/9/2021 9:26 AM

## 2021-11-17 ENCOUNTER — VIRTUAL VISIT (OUTPATIENT)
Dept: PRIMARY CARE CLINIC | Age: 56
End: 2021-11-17
Payer: COMMERCIAL

## 2021-11-17 DIAGNOSIS — F32.A DEPRESSIVE DISORDER: Primary | ICD-10-CM

## 2021-11-17 PROCEDURE — 99213 OFFICE O/P EST LOW 20 MIN: CPT | Performed by: FAMILY MEDICINE

## 2021-11-18 RX ORDER — VENLAFAXINE HYDROCHLORIDE 37.5 MG/1
37.5 CAPSULE, EXTENDED RELEASE ORAL DAILY
Qty: 30 CAPSULE | Refills: 3 | Status: SHIPPED | OUTPATIENT
Start: 2021-11-18 | End: 2022-04-29 | Stop reason: SINTOL

## 2021-11-18 NOTE — PROGRESS NOTES
[address] [facility] PRIMARY CARE  [address] [facility] [address] [facility] [place] [facility] [address]  Dept: [phone]  Dept Fax: [phone]     11/17/2021      [facility]   1965     Chief Complaint   Patient presents with    Depression       HPI    Pt encounter today completed virtual visit using virtual platform. Services were provided through a video synchronous discussion virtually to substitute for in-person clinic visit. Patient and provider were located at their individual locations. Patient seen today for c/o depression/somnolence. Feels like he wants to sleep all the time. Major stress with current work situation. Has tried citalopram and sertraline with no major improvement. Feels like both just made him more tired. Prior to Visit Medications    Medication Sig Taking? Authorizing Provider   dapagliflozin (FARXIGA) 5 MG tablet TAKE 1 TABLET BY MOUTH EVERY MORNING Yes Tiffany García, DO   gabapentin (NEURONTIN) 300 MG capsule Take 1 capsule by mouth nightly for 180 days. Yes Rajan Coats DO   sertraline (ZOLOFT) 25 MG tablet Take 1 tablet by mouth daily Yes Tiffany García DO   metFORMIN (GLUCOPHAGE) 1000 MG tablet TAKE 1 TABLET BY MOUTH TWICE A DAY WITH MEALS Yes Tiffany García DO   losartan (COZAAR) 100 MG tablet TAKE 1 TABLET BY MOUTH EVERY DAY Yes Tiffany García DO   pravastatin (PRAVACHOL) 40 MG tablet Take 1 tablet by mouth daily Yes Rajan Coats DO   amLODIPine (NORVASC) 10 MG tablet TAKE 1 TABLET BY MOUTH EVERY DAY Yes Rajan Coats DO   blood glucose test strips (ONETOUCH VERIO) strip Use to check blood sugar BID before meals or as needed. Yes Rajan Coats DO   Lancets MISC Use to check blood sugar BID before meals or as needed.  Yes Tiffany García, DO   Dulaglutide (TRULICITY) 3.54 HV/9.4TX SOPN Inject 0.75 mg into the skin once a week Yes Rajan Coats, DO Blood Glucose Monitoring Suppl (ONETOUCH VERIO) w/Device KIT Use to check blood sugar BID before meals or as needed. Yes Tiffany García, DO   alprostadil, Vasodilator, (CAVERJECT) 20 MCG SOLR intracavernosal injection 20 mcg by Intracavernosal route Yes Historical Provider, MD       Past Medical History:   Diagnosis Date    Depression     Erectile dysfunction     HLD (hyperlipidemia)     Hypertension     Type II diabetes mellitus (HCC)         Social History     Tobacco Use    Smoking status: Never Smoker    Smokeless tobacco: Never Used   Substance Use Topics    Alcohol use: Yes    Drug use: No        Past Surgical History:   Procedure Laterality Date    MOUTH SURGERY          Allergies   Allergen Reactions    Fish-Derived Products Hives and Shortness Of Breath    Other Hives and Shortness Of Breath        Family History   Problem Relation Age of Onset    Heart Failure Mother     Heart Disease Father         Patient's past medical history, surgical history, family history, medications, and allergies  were all reviewed and updated as appropriate today. Review of Systems   Constitutional: Negative for fever. Psychiatric/Behavioral: Positive for dysphoric mood. Negative for suicidal ideas. The patient is not nervous/anxious. Vitals unable to obtain and physical exam is limited 2/2 virtual visit nature of this encounter. Physical Exam  Vitals reviewed. Constitutional:       General: He is not in acute distress. Appearance: Normal appearance. He is not ill-appearing. Eyes:      Conjunctiva/sclera: Conjunctivae normal.   Pulmonary:      Effort: Pulmonary effort is normal.   Musculoskeletal:      Cervical back: Neck supple. Neurological:      Mental Status: He is alert. Psychiatric:         Mood and Affect: Mood normal.         Assessment:  Encounter Diagnosis   Name Primary?  Depressive disorder Yes       Plan:    - He will stop the sertraline.  Has failed SSRI x 2 now. Discussed options for treatment. Would like to proceed with trial of venlafaxine instead. Rx sent. Will follow up closely in 4 weeks. New Prescriptions    VENLAFAXINE (EFFEXOR XR) 37.5 MG EXTENDED RELEASE CAPSULE    Take 1 capsule by mouth daily        No orders of the defined types were placed in this encounter. Return in about 4 weeks (around 12/15/2021) for VV- follow up depression. Total time spent including virtual face to face consultation, chart review, coordination of care and documentation - [address] [facility], DO Darien David is a 61 y.o. male being evaluated by a Virtual Visit (video visit) encounter to address concerns as mentioned above. A caregiver was present when appropriate. Due to this being a TeleHealth encounter (During MHQDJ-14 public health emergency), evaluation of the following organ systems was limited: Vitals/Constitutional/EENT/Resp/CV/GI//MS/Neuro/Skin/Heme-Lymph-Imm. Pursuant to the emergency declaration under the [facility], [address] [place] [address] authority and the [employer] Act, this Virtual Visit was conducted with patient's (and/or legal guardian's) consent, to reduce the patient's risk of exposure to COVID-19 and provide necessary medical care. The patient (and/or legal guardian) has also been advised to contact this office for worsening conditions or problems, and seek emergency medical treatment and/or call 911 if deemed necessary.

## 2021-11-22 ENCOUNTER — TELEPHONE (OUTPATIENT)
Dept: PHARMACY | Age: 56
End: 2021-11-22

## 2021-11-22 RX ORDER — DULAGLUTIDE 0.75 MG/.5ML
0.75 INJECTION, SOLUTION SUBCUTANEOUS WEEKLY
Qty: 12 PEN | Refills: 1 | Status: SHIPPED | OUTPATIENT
Start: 2021-11-22 | End: 2022-02-17 | Stop reason: SDUPTHER

## 2021-11-22 NOTE — TELEPHONE ENCOUNTER
Pt needing refill on Trulicity. Accidentally wasted one injection. For Ignacio Parrish in place:   Yes   Recommendation Provided To: Pharmacy: 1   Intervention Detail: Refill(s) Provided   Gap Closed?: Yes    Intervention Accepted By: Pharmacy: 1   Time Spent (min): 5

## 2021-11-22 NOTE — TELEPHONE ENCOUNTER
Pt would like a VV scheduled with Dr Lali Sue to discuss COVID vaccine recommendations. Please call pt to schedule.      Jim LeeD, [facility]  Medication Management Clinic   [address] Ph: [phone]  Marylen Beverage Ph: [phone]  11/22/2021 5:31 PM

## 2021-11-28 RX ORDER — PRAVASTATIN SODIUM 40 MG
40 TABLET ORAL DAILY
Qty: 90 TABLET | Refills: 1 | Status: SHIPPED | OUTPATIENT
Start: 2021-11-28 | End: 2022-02-17 | Stop reason: ALTCHOICE

## 2021-11-28 RX ORDER — LOSARTAN POTASSIUM 100 MG/1
TABLET ORAL
Qty: 90 TABLET | Refills: 1 | Status: SHIPPED | OUTPATIENT
Start: 2021-11-28 | End: 2022-04-29 | Stop reason: SDUPTHER

## 2021-11-28 RX ORDER — AMLODIPINE BESYLATE 10 MG/1
TABLET ORAL
Qty: 90 TABLET | Refills: 1 | Status: SHIPPED | OUTPATIENT
Start: 2021-11-28 | End: 2022-04-29 | Stop reason: SDUPTHER

## 2021-12-01 ENCOUNTER — TELEMEDICINE (OUTPATIENT)
Dept: PRIMARY CARE CLINIC | Age: 56
End: 2021-12-01
Payer: COMMERCIAL

## 2021-12-01 DIAGNOSIS — Z71.85 VACCINE COUNSELING: Primary | ICD-10-CM

## 2021-12-01 PROCEDURE — 99213 OFFICE O/P EST LOW 20 MIN: CPT | Performed by: FAMILY MEDICINE

## 2021-12-01 ASSESSMENT — ENCOUNTER SYMPTOMS: COUGH: 0

## 2021-12-01 NOTE — PROGRESS NOTES
[address] [facility] PRIMARY CARE  [address] [place] [address]  Dept: [phone]  Dept Fax: [phone]     12/1/2021      Adri Quezada   1965     Chief Complaint   Patient presents with    Other     Would like to discuss covid vaccine       HPI    Pt encounter today completed virtual visit using virtual platform. Services were provided through a video synchronous discussion virtually to substitute for in-person clinic visit. Patient and provider were located at their individual locations. Patient was scheduled for a visit today to discuss his concerns re: covid vaccine. States he had two family members who were \"bedridden\" after getting the covid vaccine for weeks. Therefore is hesitant. States he also questions why the major pharmaceutical companies and [employer] are \"exempt\" from getting it. Unsure of his source on this information. Is anxious to leave his job and get one elsewhere but all of the potential jobs require the vaccine so he feels conflicted. Prior to Visit Medications    Medication Sig Taking? Authorizing Provider   losartan (COZAAR) 100 MG tablet TAKE 1 TABLET BY MOUTH EVERY DAY  Tiffany García, DO   pravastatin (PRAVACHOL) 40 MG tablet TAKE 1 TABLET BY MOUTH DAILY  Tiffany García DO   amLODIPine (NORVASC) 10 MG tablet TAKE 1 TABLET BY MOUTH EVERY DAY  Tiffany García DO   Dulaglutide (TRULICITY) 1.97 XC/0.9BQ SOPN Inject 0.75 mg into the skin once a week  [employer], DO   venlafaxine (EFFEXOR XR) 37.5 MG extended release capsule Take 1 capsule by mouth daily  Tiffany García DO   dapagliflozin (FARXIGA) 5 MG tablet TAKE 1 TABLET BY MOUTH EVERY MORNING  Tiffany García DO   gabapentin (NEURONTIN) 300 MG capsule Take 1 capsule by mouth nightly for 180 days.   Tiffany García, DO   metFORMIN (GLUCOPHAGE) 1000 MG tablet TAKE 1 TABLET BY MOUTH TWICE A DAY WITH [address], DO   blood glucose test strips (ONETOUCH VERIO) strip Use to check blood sugar BID before meals or as needed. Tiffany García, DO   Lancets MISC Use to check blood sugar BID before meals or as needed. [address], DO   Blood Glucose Monitoring Suppl (Sanjay Level) w/Device KIT Use to check blood sugar BID before meals or as needed. Tiffany Mcclellan, DO   alprostadil, Vasodilator, (CAVERJECT) 20 MCG SOLR intracavernosal injection 20 mcg by Intracavernosal route  Historical Provider, MD       Past Medical History:   Diagnosis Date    Depression     Erectile dysfunction     HLD (hyperlipidemia)     Hypertension     Type II diabetes mellitus (HCC)         Social History     Tobacco Use    Smoking status: Never Smoker    Smokeless tobacco: Never Used   Substance Use Topics    Alcohol use: Yes    Drug use: No        Past Surgical History:   Procedure Laterality Date    MOUTH SURGERY          Allergies   Allergen Reactions    Fish-Derived Products Hives and Shortness Of Breath    Other Hives and Shortness Of Breath        Family History   Problem Relation Age of Onset    Heart Failure Mother     Heart Disease Father         Patient's past medical history, surgical history, family history, medications, and allergies  were all reviewed and updated as appropriate today. Review of Systems   Constitutional: Negative for fever. Respiratory: Negative for cough. Vitals unable to obtain and physical exam is limited 2/2 virtual visit nature of this encounter. Physical Exam  Vitals reviewed. Constitutional:       General: He is not in acute distress. Appearance: Normal appearance. He is not ill-appearing. Eyes:      Conjunctiva/sclera: Conjunctivae normal.   Pulmonary:      Effort: Pulmonary effort is normal.   Musculoskeletal:      Cervical back: Neck supple. Neurological:      Mental Status: He is alert.    Psychiatric: Mood and Affect: Mood normal.         Assessment:  Encounter Diagnosis   Name Primary?  Vaccine counseling Yes       Plan:    - We discussed risks/benefits of the vaccine as well as intended purpose which is to minimize risk of death from COVID-19 which has been well supported in the literature. Encouraged patient to consider getting in the near future. He states he will let me know when he makes decision. New Prescriptions    No medications on file        No orders of the defined types were placed in this encounter. Return if symptoms worsen or fail to improve. Total time spent including virtual face to face consultation, chart review, coordination of care and documentation - [address] [facility], DO Estella Busby is a 61 y.o. male being evaluated by a Virtual Visit (video visit) encounter to address concerns as mentioned above. A caregiver was present when appropriate. Due to this being a TeleHealth encounter (During [facility] public health emergency), evaluation of the following organ systems was limited: Vitals/Constitutional/EENT/Resp/CV/GI//MS/Neuro/Skin/Heme-Lymph-Imm. Pursuant to the emergency declaration under the [facility], [address] [place] [address] waiver authority and the [employer] and [employer] Act, this Virtual Visit was conducted with patient's (and/or legal guardian's) consent, to reduce the patient's risk of exposure to COVID-19 and provide necessary medical care. The patient (and/or legal guardian) has also been advised to contact this office for worsening conditions or problems, and seek emergency medical treatment and/or call 911 if deemed necessary.

## 2021-12-09 ENCOUNTER — VIRTUAL VISIT (OUTPATIENT)
Dept: PHARMACY | Age: 56
End: 2021-12-09
Payer: COMMERCIAL

## 2021-12-09 DIAGNOSIS — E11.9 TYPE 2 DIABETES MELLITUS WITHOUT COMPLICATION, WITHOUT LONG-TERM CURRENT USE OF INSULIN (HCC): Primary | ICD-10-CM

## 2021-12-09 PROCEDURE — 99211 OFF/OP EST MAY X REQ PHY/QHP: CPT

## 2021-12-09 NOTE — PROGRESS NOTES
Disclaimer for Virtual Visits: We want to confirm that, for purposes of billing, this is a virtual visit with your provider for which we will submit a claim for reimbursement with your insurance company. You may be responsible for any copays, coinsurance amounts or other amounts not covered by your insurance company. If you do not accept this, unfortunately we will not be able to schedule a virtual visit with the provider. Do you accept? Yes. CLINICAL PHARMACY NOTEDiabetes    Darlin Chaney is a 61 y.o. male with PMHX significant for HTN, HLD and Type 2 Diabetes referred by Dr Sun Monge for diabetes counseling and medication management. Past Medical History:   Diagnosis Date    Depression     Erectile dysfunction     HLD (hyperlipidemia)     Hypertension     Type II diabetes mellitus (HCC)      Treatment Adherence:  Diet: Hot chocolate and glazed donut qAM; switched from hot chocolate to hot tea with 2 peppermints (doesn't like coffee). Only eats when hungry. Eats donut to just to have something in stomach before work. Has lunch at work and light dinner. Doesn't like to waste food. Loves raw veggies and spinach salads, but doesn't eat often. Used to juice with beets, kale, spinach, carrots, apple, but someone told him it was high in sugar. Eats large bakery pretzel once per week. Trying to cut out fried foods. Drinks Red Bull 1x per month. Likes sweet tea, but does not drink often. Doesn't eat \"sugary stuff. \" Water \"unlimited\" Yeti 2-3 per day. Metamucil 1x/week   3/30/21: Stopped Red Bull and hot chocolate with donut, replaced with black tea with 2 peppermints and rice cake; reports 'no sugar in the house.' Drowsy from new antidepressant so sometimes sleeps through dinner. Still hydrating with lots of water every day.   4/15/21: Drank only 1 pop in 2 weeks, a lot of water, black tea qAM with peppermint, ice cream and cake over the weekend for brother's bday, but otherwise continues with positive lifestyle changes. Only eats when he is hungry. Tries to eat earlier dinner, not right before bed.    5/13/21: Hot chocolate only once b/c he was given some for free. Otherwise he has been really consistent with lifestyle changes. Treated himself to one drink on Mother's day with his sister. 5/19/21: Has been compliant with diet changes - no donuts / hot chocolate in past month. Eats a lot of fruit, mostly for breakfast. Tolerating Trulicity, states it is decreasing his appetite. 6/8/21: giant root beer float last week, otherwise compliant with diet. 11/9/21: no major changes  12/9/21: really cut back on sugar, none in house, only tea with peppermint, only hot chocolate on occasion. Doesn't like the sugar free kind. Only 8 oz cups 1-2 times per week. Exercise: job is strenuous- works long hours in [employer] and is affecting his mental health (looking for different job), cannot go to gym 2/2 COVID. Used to [place] at the gym\" and enjoys working out. 11/9/21: Stopped gym due to depression/job, was going once per week. When he goes, he feels like he could run a marathon the next morning. 12/9/21: no gym since last visit, too tired, but working on it. Wants to go tomorrow, but has something to do. Re-working his schedule. Weight trend:   4/15/21: home weight is 240 lbs! Lost ~15 lbs since Jan; hasn't weighed since April 6/8/21: wt 249 lbs  11/9/21: wt 246 lbs    Barriers: cost of meds, COVID restrictions. He was able to get Trulicity and [place] from pharmacy with no issues after long discussion with pharmacy/insurance last visit. Now has to get medications from [facility] as they are the preferred pharmacy for his insurance. Type 2 Diabetes Mellitus  Year diagnosed pre-2015. Related hospitalizations/ED visits include: none  Current symptoms/problems include neuropathy, polyuria and polydipsia.   Episodes of hypoglycemia? no  Eye exam current (within one year): no 2019, lost job 2020, but plans to get checked soon. Tobacco history: He  reports that he has never smoked. He has never used smokeless tobacco.     Current diabetes medications: Metformin 1000mg BID, Farxiga 5 mg daily, Trulicity 2.22SP weekly   Compliance: compliant most of the time. Side effects: none. Some decreased appetite since starting Trulicity. Previous treatment regimens / response: Invokanamet and Janumet, all d/c due to cost. Glyburide d/c'd and replaced with Trulicity. Home blood sugar records:   4/15/21 FBG 75-79, bedtime 115-127  5/13/21 reports FBG consistently in 70s,  bedtime 115-120  5/19/21 reports FBG this AM as 77, bedtime was 120  6/8/21 FBG 74,  Bedtime 117- 120s  11/9/21 not checked last week due to depression, but usually at goal  12/9/21 FBG     Hypertension:    Patient denies chest pain, shortness of breath, headache, lightheadedness, blurred vision, peripheral edema, palpitations, dry cough and fatigue. He does not usually add salt to foods. Uses Mrs [employer]ust[employer]. Does not eat out. Tries to eat fresh   On ARB--losartan 100  Medication side effects: no medication side effects noted. Use of agents associated with hypertension: NSAIDS. Caffeine use: 1c tea qAM  Home blood pressure monitoring: No.      Hyperlipidemia:  He is adherent to a low saturated fat/cholesterol diet  On moderate intensity statin-- pravastatin 40  No Aspirin  No new myalgias or GI upset on pravastatin (Pravachol). Cardiovascular risk factors: advanced age (older than 54 for men, 72 for women), diabetes mellitus, dyslipidemia, family history of premature cardiovascular disease, hypertension, male gender and obesity (BMI >= 30 kg/m2)     Depression:   Patient reports depression related to job. Still looking for new job. Went to [employer] once and \"didn't feel right. \" Trying to find time for himself on the weekends, but just wants to sleep all day when he is off work.  He took a spontenous road trip with his brother 3 weeks ago which was the most fun he's had in a long time. Sees brother again next week. Saw sister on memorial day. Feels okay on Saturdays (catches up on sleep), but very depressed on Sundays, as he knows he will be going back to work on Monday. 11/9/21: recently went to sister's bday party and had a lot of fun. Was supposed to get new job at [employer], but didn't get it, which made him very depressed. 12/9/21: hasn't been to the gym and more depressed. Tried one dose of venlafaxine, which makes him a little drowsy, but not as bad as SSRIs. He agreed to start taking every night instead of in AM to see if this helps. Current medication: venlafaxine  Compliance: good, previously was only taking prn  Side effects: Drowsiness (will take at bedtime)   Pt reports dx of sleep apnea, but hasn't had sleep study. States this was not covered by insurance in the past.     Social History     Tobacco Use    Smoking status: Never Smoker    Smokeless tobacco: Never Used   Substance Use Topics    Alcohol use: Yes      Physical Exam  There were no vitals taken for this visit. There is no height or weight on file to calculate BMI.   Wt Readings from Last 3 Encounters:   10/15/21 246 lb (111.6 kg)   07/23/21 253 lb 6.4 oz (114.9 kg)   01/08/21 255 lb 9.6 oz (115.9 kg)      BP Readings from Last 3 Encounters:   10/15/21 138/72   07/23/21 (!) 143/76   01/08/21 138/72        Pertinent Labs:     Lab Results   Component Value Date    LABA1C 6.0 10/15/2021    LABA1C 6.2 07/23/2021    LABA1C 8.0 05/18/2021      Lab Results   Component Value Date    MALBCR 6.5 01/29/2021    LABMICR 1.30 01/29/2021     No results found for: CRCLEARANCE   Lab Results   Component Value Date    LDLCALC 95 01/29/2021    CHOL 156 01/29/2021    TRIG 84 01/29/2021    HDL 44 01/29/2021     Lab Results   Component Value Date    CREATININE 0.9 05/18/2021    BUN 15 05/18/2021     05/18/2021    K 4.6 05/18/2021     05/18/2021    CO2 26 05/18/2021     Lab Results   Component Value Date    AST 20 04/25/2020    ALT 25 04/25/2020    BILITOT 0.4 04/25/2020    ALKPHOS 131 (H) 04/25/2020     No components found for: VITB12  No results found for: TSH     Allergies   Allergen Reactions    Fish-Derived Products Hives and Shortness Of Breath    Other Hives and Shortness Of Breath     Immunization History   Administered Date(s) Administered    Tdap (Boostrix, Adacel) 10/20/2015     Current Outpatient Medications on File Prior to Visit   Medication Sig Dispense Refill    losartan (COZAAR) 100 MG tablet TAKE 1 TABLET BY MOUTH EVERY DAY 90 tablet 1    pravastatin (PRAVACHOL) 40 MG tablet TAKE 1 TABLET BY MOUTH DAILY 90 tablet 1    amLODIPine (NORVASC) 10 MG tablet TAKE 1 TABLET BY MOUTH EVERY DAY 90 tablet 1    Dulaglutide (TRULICITY) 6.45 BY/0.6WM SOPN Inject 0.75 mg into the skin once a week 12 pen 1    venlafaxine (EFFEXOR XR) 37.5 MG extended release capsule Take 1 capsule by mouth daily 30 capsule 3    dapagliflozin (FARXIGA) 5 MG tablet TAKE 1 TABLET BY MOUTH EVERY MORNING 90 tablet 1    gabapentin (NEURONTIN) 300 MG capsule Take 1 capsule by mouth nightly for 180 days. 180 capsule 0    metFORMIN (GLUCOPHAGE) 1000 MG tablet TAKE 1 TABLET BY MOUTH TWICE A DAY WITH MEALS 180 tablet 3    blood glucose test strips (ONETOUCH VERIO) strip Use to check blood sugar BID before meals or as needed. 200 each 11    Lancets MISC Use to check blood sugar BID before meals or as needed. 200 each 11    Blood Glucose Monitoring Suppl (ONETOUCH VERIO) w/Device KIT Use to check blood sugar BID before meals or as needed. 1 kit 0    alprostadil, Vasodilator, (CAVERJECT) 20 MCG SOLR intracavernosal injection 20 mcg by Intracavernosal route       No current facility-administered medications on file prior to visit. Medication list reviewed and updated.       The 10-year ASCVD risk score (Andrew Bee, et al., 2013) is: 23.6%    Values used to calculate the score:      Age: 61 years      Sex: Male Is Non- : Yes      Diabetic: Yes      Tobacco smoker: No      Systolic Blood Pressure: 942 mmHg      Is BP treated: Yes      HDL Cholesterol: 44 mg/dL      Total Cholesterol: 156 mg/dL    Assessment/Plan:   1. Type 2 diabetes mellitus without complication, without long-term current use of insulin ([facility])  A1c at goal (11.8 1/29/21 -> 8.0-->6.2-->6.0%). -Continue metformin 1g BID, farixga 5 mg qd, and Trulicity 0.89 mg weekly. -Due for eye exam, counseled  -Due for pneumonia vaccine, counseled  -Encouraged pt to start going to gym again. Start with once per week. Commit to just 15 min to start, then increase time/frequency    2. Essential hypertension  Last BP slightly above goal <130/80, but appears to normally be at goal in office. SGTL2 will help reduce BP  No albuminuria   On ARB     3. Pure hypercholesterolemia  On moderate intensity statin-- pravastatin 40 mg  Based on 10-yr ASCVD >20% and LDL 95, could consider high-intensity statin therapy   Repeat FLP: annually    4. Depression  -Still suffers depression mostly as a result of his job. Had job offers, but require vaccination. Not willing to get COVID vaccine despite recommendation from myself and his PCP. -Agreed to start taking venlafaxine every night.   -Recommend asking PCP about sleep study referral.   -Goals: Start exercise 1 day per week and build to 3 days per daay. Dedicate at least 1 hour each week to doing something you enjoy (Rastafarian, time with friends/family)     Health Maintenance Due   Topic Date Due    Pneumococcal 0-64 years Vaccine (1 of 2 - PPSV23) Never done    COVID-19 Vaccine (1) Never done    Diabetic retinal exam  Never done    Colon cancer screen colonoscopy  Never done    Shingles Vaccine (1 of 2) Never done    Diabetic foot exam  09/29/2021     Education provided:  Discussed general issues about diabetes pathophysiology and management.   Reviewed A1c and blood sugar goals with patient  Counseled on changes to medication regimen and common side effects  Reviewed meter instruction technique and importance of checking BG twice a day before breakfast and 2 hr after meals. Provided BG log and instructed patient to bring to f/u appointment. Discussed symptoms and management of hypoglycemic episodes. Encouraged weight loss  Encouraged aerobic exercise   Educated on diet (low carb/sugar, DASH, low cholesterol)    Follow up: 1 month    Discussed with patient the Pharmacist Collaborative Practice Agreement. Patient provided verbal and/or electronic (ex. mychart) consent to participate in the collaborative practice agreement between the pharmacist and referred patient. This is in lieu of paper consent due to COVID-19 precautions and the use of remote/virtual visits. Time spent with patient: 30 mins    Ehsan Baez, PharmD, [facility]  Medication Management [facility]   [id number] Ph: [phone]  Ftio Morin Ph: [phone]  2021 8:11 AM      For Pharmacy [id number] [place] [address] in place:   Yes   Recommendation Provided To: Patient/Caregiver: 2 via Virtual Visit   Intervention Detail: Adherence Monitorin and Scheduled Appointment   Gap Closed?: Yes    Intervention Accepted By: Patient/Caregiver: 2   Time Spent (min): 30

## 2022-01-14 ENCOUNTER — VIRTUAL VISIT (OUTPATIENT)
Dept: PRIMARY CARE CLINIC | Age: 57
End: 2022-01-14
Payer: COMMERCIAL

## 2022-01-14 DIAGNOSIS — R05.9 COUGH: ICD-10-CM

## 2022-01-14 DIAGNOSIS — E11.9 TYPE 2 DIABETES MELLITUS WITHOUT COMPLICATION, WITHOUT LONG-TERM CURRENT USE OF INSULIN (HCC): Primary | ICD-10-CM

## 2022-01-14 DIAGNOSIS — E11.9 TYPE 2 DIABETES MELLITUS WITHOUT COMPLICATION, WITHOUT LONG-TERM CURRENT USE OF INSULIN (HCC): ICD-10-CM

## 2022-01-14 LAB
BASOPHILS ABSOLUTE: 0.1 K/UL (ref 0–0.2)
BASOPHILS RELATIVE PERCENT: 1 %
EOSINOPHILS ABSOLUTE: 0.2 K/UL (ref 0–0.6)
EOSINOPHILS RELATIVE PERCENT: 3.2 %
HCT VFR BLD CALC: 41.7 % (ref 40.5–52.5)
HEMOGLOBIN: 13.6 G/DL (ref 13.5–17.5)
LYMPHOCYTES ABSOLUTE: 2.3 K/UL (ref 1–5.1)
LYMPHOCYTES RELATIVE PERCENT: 38.3 %
MCH RBC QN AUTO: 25.3 PG (ref 26–34)
MCHC RBC AUTO-ENTMCNC: 32.7 G/DL (ref 31–36)
MCV RBC AUTO: 77.5 FL (ref 80–100)
MONOCYTES ABSOLUTE: 0.6 K/UL (ref 0–1.3)
MONOCYTES RELATIVE PERCENT: 9.6 %
NEUTROPHILS ABSOLUTE: 2.9 K/UL (ref 1.7–7.7)
NEUTROPHILS RELATIVE PERCENT: 47.9 %
PDW BLD-RTO: 15.5 % (ref 12.4–15.4)
PLATELET # BLD: 331 K/UL (ref 135–450)
PMV BLD AUTO: 8.4 FL (ref 5–10.5)
RBC # BLD: 5.38 M/UL (ref 4.2–5.9)
WBC # BLD: 6.1 K/UL (ref 4–11)

## 2022-01-14 PROCEDURE — 99213 OFFICE O/P EST LOW 20 MIN: CPT | Performed by: FAMILY MEDICINE

## 2022-01-14 RX ORDER — BENZONATATE 100 MG/1
100 CAPSULE ORAL 2 TIMES DAILY PRN
Qty: 10 CAPSULE | Refills: 0 | Status: SHIPPED | OUTPATIENT
Start: 2022-01-14 | End: 2022-01-21

## 2022-01-14 NOTE — PROGRESS NOTES
[address] [facility] [address] PRIMARY CARE  [address] [place] [id number]  Dept: [phone]  Dept Fax: [phone]     1/14/2022      Daphnie Adalid   1965     Chief Complaint   Patient presents with    Diabetes       HPI    Pt encounter today completed virtual visit using virtual platform. Services were provided through a video synchronous discussion virtually to substitute for in-person clinic visit. Patient and provider were located at their individual locations. Patient reported cough/sinus sx at check in. Visit was changed to virtual. No acute concerns. Sx mild. He is unvaccinated. Declines COVID testing. Diabetes has been under much better control. Denies hypoglycemia. Following closely with Clinical Pharm. Last a1c = 6.0%. Prior to Visit Medications    Medication Sig Taking? Authorizing Provider   benzonatate (TESSALON) 100 MG capsule Take 1 capsule by mouth 2 times daily as needed for Cough Yes Tiffany García DO   losartan (COZAAR) 100 MG tablet TAKE 1 TABLET BY MOUTH EVERY DAY  Tiffany García DO   pravastatin (PRAVACHOL) 40 MG tablet TAKE 1 TABLET BY MOUTH DAILY  Tiffany García DO   amLODIPine (NORVASC) 10 MG tablet TAKE 1 TABLET BY MOUTH EVERY DAY  Tiffany García DO   Dulaglutide (TRULICITY) 6.87 CR/5.6TJ SOPN Inject 0.75 mg into the skin once a week  Selam Bunch,    venlafaxine (EFFEXOR XR) 37.5 MG extended release capsule Take 1 capsule by mouth daily  Tiffany García DO   dapagliflozin (FARXIGA) 5 MG tablet TAKE 1 TABLET BY MOUTH EVERY MORNING  Tiffany García DO   gabapentin (NEURONTIN) 300 MG capsule Take 1 capsule by mouth nightly for 180 days.   Tiffany García DO   metFORMIN (GLUCOPHAGE) 1000 MG tablet TAKE 1 TABLET BY MOUTH TWICE A DAY WITH MEALS  Tiffany García DO   blood glucose test strips (ONETOUCH VERIO) strip Use to check blood sugar BID before meals or as needed. Tiffany García, DO   Lancets MISC Use to check blood sugar BID before meals or as needed. [address], DO   Blood Glucose Monitoring Suppl (Alondra Bertrand) w/Device KIT Use to check blood sugar BID before meals or as needed. Tiffany Mcclellan, DO   alprostadil, Vasodilator, (CAVERJECT) 20 MCG SOLR intracavernosal injection 20 mcg by Intracavernosal route  Historical Provider, MD       Past Medical History:   Diagnosis Date    Depression     Erectile dysfunction     HLD (hyperlipidemia)     Hypertension     Type II diabetes mellitus (HCC)         Social History     Tobacco Use    Smoking status: Never Smoker    Smokeless tobacco: Never Used   Substance Use Topics    Alcohol use: Yes    Drug use: No        Past Surgical History:   Procedure Laterality Date    MOUTH SURGERY          Allergies   Allergen Reactions    Fish-Derived Products Hives and Shortness Of Breath    Other Hives and Shortness Of Breath        Family History   Problem Relation Age of Onset    Heart Failure Mother     Heart Disease Father         Patient's past medical history, surgical history, family history, medications, and allergies  were all reviewed and updated as appropriate today. Review of Systems   Constitutional: Negative for chills, fever and unexpected weight change. Respiratory: Negative for cough and shortness of breath. Cardiovascular: Negative for chest pain, palpitations and leg swelling. Gastrointestinal: Negative for abdominal pain, diarrhea, nausea and vomiting. Vitals unable to obtain and physical exam is limited 2/2 virtual visit nature of this encounter. Physical Exam  Vitals reviewed. Constitutional:       General: He is not in acute distress. Appearance: Normal appearance. He is not ill-appearing.    Eyes:      Conjunctiva/sclera: Conjunctivae normal.   Pulmonary:      Effort: Pulmonary effort is normal.   Musculoskeletal: Cervical back: Neck supple. Neurological:      Mental Status: He is alert. Psychiatric:         Mood and Affect: Mood normal.         Assessment:  Encounter Diagnoses   Name Primary?  Type 2 diabetes mellitus without complication, without long-term current use of insulin (HCC) Yes    Cough        Plan:    - Fasting routine labs. - Supportive care for the cough. Call for worsening sx. New Prescriptions    BENZONATATE (TESSALON) 100 MG CAPSULE    Take 1 capsule by mouth 2 times daily as needed for Cough        Orders Placed This Encounter   Procedures    Comprehensive Metabolic Panel    Lipid, Fasting    CBC Auto Differential    Hemoglobin A1C    Microalbumin / Creatinine Urine Ratio        Return in about 3 months (around 4/14/2022) for Follow up DMII. Total time spent including virtual face to face consultation, chart review, coordination of care and documentation - [address] is a 61 y.o. male being evaluated by a Virtual Visit (video visit) encounter to address concerns as mentioned above. A caregiver was present when appropriate. Due to this being a TeleHealth encounter (During [facility]- public health emergency), evaluation of the following organ systems was limited: Vitals/Constitutional/EENT/Resp/CV/GI//MS/Neuro/Skin/Heme-Lymph-Imm. Pursuant to the emergency declaration under the [facility] [address] authority and the [employer] Act, this Virtual Visit was conducted with patient's (and/or legal guardian's) consent, to reduce the patient's risk of exposure to COVID-19 and provide necessary medical care. The patient (and/or legal guardian) has also been advised to contact this office for worsening conditions or problems, and seek emergency medical treatment and/or call 911 if deemed necessary.

## 2022-01-15 LAB
A/G RATIO: 1.1 (ref 1.1–2.2)
ALBUMIN SERPL-MCNC: 4.1 G/DL (ref 3.4–5)
ALP BLD-CCNC: 85 U/L (ref 40–129)
ALT SERPL-CCNC: 16 U/L (ref 10–40)
ANION GAP SERPL CALCULATED.3IONS-SCNC: 16 MMOL/L (ref 3–16)
AST SERPL-CCNC: 20 U/L (ref 15–37)
BILIRUB SERPL-MCNC: 0.7 MG/DL (ref 0–1)
BUN BLDV-MCNC: 10 MG/DL (ref 7–20)
CALCIUM SERPL-MCNC: 8.6 MG/DL (ref 8.3–10.6)
CHLORIDE BLD-SCNC: 102 MMOL/L (ref 99–110)
CHOLESTEROL, FASTING: 153 MG/DL (ref 0–199)
CO2: 22 MMOL/L (ref 21–32)
CREAT SERPL-MCNC: 0.8 MG/DL (ref 0.9–1.3)
CREATININE URINE: 119.8 MG/DL (ref 39–259)
ESTIMATED AVERAGE GLUCOSE: 145.6 MG/DL
GFR AFRICAN AMERICAN: >60
GFR NON-AFRICAN AMERICAN: >60
GLUCOSE BLD-MCNC: 79 MG/DL (ref 70–99)
HBA1C MFR BLD: 6.7 %
HDLC SERPL-MCNC: 36 MG/DL (ref 40–60)
LDL CHOLESTEROL CALCULATED: 102 MG/DL
MICROALBUMIN UR-MCNC: <1.2 MG/DL
MICROALBUMIN/CREAT UR-RTO: NORMAL MG/G (ref 0–30)
POTASSIUM SERPL-SCNC: 4.5 MMOL/L (ref 3.5–5.1)
SODIUM BLD-SCNC: 140 MMOL/L (ref 136–145)
TOTAL PROTEIN: 7.7 G/DL (ref 6.4–8.2)
TRIGLYCERIDE, FASTING: 76 MG/DL (ref 0–150)
VLDLC SERPL CALC-MCNC: 15 MG/DL

## 2022-01-18 ASSESSMENT — ENCOUNTER SYMPTOMS
NAUSEA: 0
ABDOMINAL PAIN: 0
DIARRHEA: 0
SHORTNESS OF BREATH: 0
COUGH: 0
VOMITING: 0

## 2022-01-20 ENCOUNTER — VIRTUAL VISIT (OUTPATIENT)
Dept: PHARMACY | Age: 57
End: 2022-01-20
Payer: COMMERCIAL

## 2022-01-20 DIAGNOSIS — E78.2 MIXED HYPERLIPIDEMIA: ICD-10-CM

## 2022-01-20 DIAGNOSIS — E11.9 TYPE 2 DIABETES MELLITUS WITHOUT COMPLICATION, WITHOUT LONG-TERM CURRENT USE OF INSULIN (HCC): Primary | ICD-10-CM

## 2022-01-20 DIAGNOSIS — I10 PRIMARY HYPERTENSION: ICD-10-CM

## 2022-01-20 DIAGNOSIS — F32.A DEPRESSIVE DISORDER: ICD-10-CM

## 2022-01-20 PROCEDURE — 99211 OFF/OP EST MAY X REQ PHY/QHP: CPT

## 2022-01-20 NOTE — PROGRESS NOTES
Disclaimer for Virtual Visits: We want to confirm that, for purposes of billing, this is a virtual visit with your provider for which we will submit a claim for reimbursement with your insurance company. You may be responsible for any copays, coinsurance amounts or other amounts not covered by your insurance company. If you do not accept this, unfortunately we will not be able to schedule a virtual visit with the provider. Do you accept? Yes. CLINICAL PHARMACY NOTEDiabetes    Marietta Calderon is a 61 y.o. male with PMHX significant for HTN, HLD and Type 2 Diabetes referred by Dr Janes Blevins for diabetes counseling and medication management. Past Medical History:   Diagnosis Date    Depression     Erectile dysfunction     HLD (hyperlipidemia)     Hypertension     Type II diabetes mellitus (HCC)      Treatment Adherence:  Diet: Hot chocolate and glazed donut qAM; switched from hot chocolate to hot tea with 2 peppermints (doesn't like coffee). Only eats when hungry. Eats donut to just to have something in stomach before work. Has lunch at work and light dinner. Doesn't like to waste food. Loves raw veggies and spinach salads, but doesn't eat often. Used to juice with beets, kale, spinach, carrots, apple, but someone told him it was high in sugar. Eats large bakery pretzel once per week. Trying to cut out fried foods. Drinks Red Bull 1x per month. Likes sweet tea, but does not drink often. Doesn't eat \"sugary stuff. \" Water \"unlimited\" Yeti 2-3 per day. Metamucil 1x/week   3/30/21: Stopped Red Bull and hot chocolate with donut, replaced with black tea with 2 peppermints and rice cake; reports 'no sugar in the house.' Drowsy from new antidepressant so sometimes sleeps through dinner. Still hydrating with lots of water every day.   4/15/21: Drank only 1 pop in 2 weeks, a lot of water, black tea qAM with peppermint, ice cream and cake over the weekend for brother's bday, but otherwise continues with positive lifestyle changes. Only eats when he is hungry. Tries to eat earlier dinner, not right before bed.    5/13/21: Hot chocolate only once b/c he was given some for free. Otherwise he has been really consistent with lifestyle changes. Treated himself to one drink on Mother's day with his sister. 5/19/21: Has been compliant with diet changes - no donuts / hot chocolate in past month. Eats a lot of fruit, mostly for breakfast. Tolerating Trulicity, states it is decreasing his appetite. 6/8/21: giant root beer float last week, otherwise compliant with diet. 11/9/21: no major changes  12/9/21: really cut back on sugar, none in house, only tea with peppermint, only hot chocolate on occasion. Doesn't like the sugar free kind. Only 8 oz cups 1-2 times per week. 1/20/22: black tea every morning, hot chocolate every once in awhile. Ramen noodles before bed-- something \"light and fast\". Drinks protein shakes-- used protein powder-- but stopped due to constipation. Will drink a boost every once in awhile if wants something sweet. Exercise: job is strenuous- works long hours in [employer] and is affecting his mental health (looking for different job), cannot go to gym 2/2 COVID. Used to [place] at the gym\" and enjoys working out. 11/9/21: Stopped gym due to depression/job, was going once per week. When he goes, he feels like he could run a marathon the next morning. 12/9/21: no gym since last visit, too tired, but working on it. Wants to go tomorrow, but has something to do. Re-working his schedule. 1/20/22: hasn't been to the gym due to work schedule/not feeling the best and also the weather but is looking forward to going back once the weather warms up    Weight trend:   4/15/21: home weight is 240 lbs! Lost ~15 lbs since Jan; hasn't weighed since April 6/8/21: wt 249 lbs  11/9/21: wt 246 lbs    Barriers: cost of meds, COVID restrictions.  He was able to get [employer]ge [employer] [place] [employer]ing from pharmacy with no issues lb 6.4 oz (114.9 kg)   01/08/21 255 lb 9.6 oz (115.9 kg)      BP Readings from Last 3 Encounters:   10/15/21 138/72   07/23/21 (!) 143/76   01/08/21 138/72        Pertinent Labs:     Lab Results   Component Value Date    LABA1C 6.7 01/14/2022    LABA1C 6.0 10/15/2021    LABA1C 6.2 07/23/2021      Lab Results   Component Value Date    MALBCR see below 01/14/2022    LABMICR <1.20 01/14/2022     No results found for: [facility]   Lab Results   Component Value Date    LDLCALC 102 (H) 01/14/2022    CHOL 156 01/29/2021    TRIG 84 01/29/2021    HDL 36 (L) 01/14/2022     Lab Results   Component Value Date    CREATININE 0.8 (L) 01/14/2022    BUN 10 01/14/2022     01/14/2022    K 4.5 01/14/2022     01/14/2022    CO2 22 01/14/2022     Lab Results   Component Value Date    AST 20 01/14/2022    ALT 16 01/14/2022    BILITOT 0.7 01/14/2022    ALKPHOS 85 01/14/2022     No components found for: VITB12  No results found for: TSH     Allergies   Allergen Reactions    Fish-Derived Products Hives and Shortness Of Breath    Other Hives and Shortness Of Breath     Immunization History   Administered Date(s) Administered    Tdap (Boostrix, Adacel) 10/20/2015     Current Outpatient Medications on File Prior to Visit   Medication Sig Dispense Refill    benzonatate (TESSALON) 100 MG capsule Take 1 capsule by mouth 2 times daily as needed for Cough 10 capsule 0    losartan (COZAAR) 100 MG tablet TAKE 1 TABLET BY MOUTH EVERY DAY 90 tablet 1    pravastatin (PRAVACHOL) 40 MG tablet TAKE 1 TABLET BY MOUTH DAILY 90 tablet 1    amLODIPine (NORVASC) 10 MG tablet TAKE 1 TABLET BY MOUTH EVERY DAY 90 tablet 1    Dulaglutide (TRULICITY) 4.72 DJ/8.2BK SOPN Inject 0.75 mg into the skin once a week 12 pen 1    venlafaxine (EFFEXOR XR) 37.5 MG extended release capsule Take 1 capsule by mouth daily 30 capsule 3    dapagliflozin (FARXIGA) 5 MG tablet TAKE 1 TABLET BY MOUTH EVERY MORNING 90 tablet 1    gabapentin (NEURONTIN) 300 MG capsule Take 1 capsule by mouth nightly for 180 days. 180 capsule 0    metFORMIN (GLUCOPHAGE) 1000 MG tablet TAKE 1 TABLET BY MOUTH TWICE A DAY WITH MEALS 180 tablet 3    blood glucose test strips (ONETOUCH VERIO) strip Use to check blood sugar BID before meals or as needed. 200 each 11    Lancets MISC Use to check blood sugar BID before meals or as needed. 200 each 11    Blood Glucose Monitoring Suppl (ONETOUCH VERIO) w/Device KIT Use to check blood sugar BID before meals or as needed. 1 kit 0    alprostadil, Vasodilator, (CAVERJECT) 20 MCG SOLR intracavernosal injection 20 mcg by Intracavernosal route       No current facility-administered medications on file prior to visit. Medication list reviewed and updated. The 10-year ASCVD risk score (Tyler Lara, et al., 2013) is: 24.8%    Values used to calculate the score:      Age: 61 years      Sex: Male      Is Non- : Yes      Diabetic: Yes      Tobacco smoker: No      Systolic Blood Pressure: 494 mmHg      Is BP treated: Yes      HDL Cholesterol: 36 mg/dL      Total Cholesterol: 153 mg/dL    Assessment/Plan:   1. Type 2 diabetes mellitus without complication, without long-term current use of insulin ([facility])  A1c at goal (11.8 1/29/21 -> 8.0-->6.2-->6.0 --> 6.7%). -Continue metformin 1g BID, farixga 5 mg qd, and Trulicity 2.25 mg weekly. -Due for eye exam, counseled. Last one probably in 2020. He has to get glasses fixed so recommended he make an apt.  -Due for pneumonia vaccine, counseled. Reminded patient to get at the pharmacy. -Try to reduce frequency of Ramen noodles, once per week or try eating half of the package. Could do peanut butter crackers before bed instead of Ramen. Try glucerna instead of Boost/Ensure for lower glycemic index.  -Encouraged pt to start going to gym again. Start with once per week. Commit to just 15 min to start, then increase time/frequency    2.  Essential hypertension  Last BP slightly above goal <130/80, but appears to normally be at goal in office. SGTL2 will help reduce BP  No albuminuria   On ARB     3. Pure hypercholesterolemia  On moderate intensity statin-- pravastatin 40 mg  Based on 10-yr ASCVD >20% and , consider high-intensity statin therapy   Repeat FLP: annually    4. Depression  -Still suffers depression mostly as a result of his job. Had job offers, but require vaccination. Not willing to get COVID vaccine despite recommendation from myself and his PCP. -Continue taking venlafaxine every night.   -Recommend asking PCP about sleep study referral.   -Goals: Start exercise 1 day per week and build to 3 days per daay. Dedicate at least 1 hour each week to doing something you enjoy ([employer], time with friends/family)     Health Maintenance Due   Topic Date Due    COVID-19 Vaccine (1) Never done    Pneumococcal 0-64 years Vaccine (1 of 2 - PPSV23) Never done    Depression Monitoring  Never done    Diabetic retinal exam  Never done    Colon cancer screen colonoscopy  Never done    Shingles Vaccine (1 of 2) Never done    Diabetic foot exam  09/29/2021     Education provided:  Discussed general issues about diabetes pathophysiology and management. Reviewed A1c and blood sugar goals with patient  Counseled on changes to medication regimen and common side effects  Reviewed meter instruction technique and importance of checking BG twice a day before breakfast and 2 hr after meals. Provided BG log and instructed patient to bring to f/u appointment. Discussed symptoms and management of hypoglycemic episodes. Encouraged weight loss  Encouraged aerobic exercise   Educated on diet (low carb/sugar, DASH, low cholesterol)    Follow up: 1 month, 2/17    Discussed with patient the Pharmacist Collaborative Practice Agreement.   Patient provided verbal and/or electronic (ex. [employer]Box [employer] [email or web address]) consent to participate in the collaborative practice agreement between the pharmacist and referred patient. This is in lieu of paper consent due to COVID-19 precautions and the use of remote/virtual visits. Time spent with patient: 30 mins    Adolfo Paul, PharmD  [facility]   [address] Ph: [phone]  [facility] Ph: [phone]  2022 8:14 AM     For Pharmacy Admin Tracking Only     CPA in place:   Yes   Recommendation Provided To: Patient/Caregiver: 2 via Virtual Visit   Intervention Detail: Adherence Monitorin and Scheduled Appointment   Gap Closed?: Yes    Intervention Accepted By: Patient/Caregiver: 2   Time Spent (min): 30

## 2022-01-25 NOTE — PROGRESS NOTES
D/w with Dr Sirena Sandra: will plan to increase to high intensity statin next visit as long as pt is tolerating pravastatin well.      Lilly Martienz, PharmD, [facility]  [facility]   [facility] [address] Ph: [phone]  [address] Ph: [phone]  1/25/2022 4:54 PM

## 2022-02-17 ENCOUNTER — TELEMEDICINE (OUTPATIENT)
Dept: PHARMACY | Age: 57
End: 2022-02-17
Payer: COMMERCIAL

## 2022-02-17 DIAGNOSIS — E11.9 TYPE 2 DIABETES MELLITUS WITHOUT COMPLICATION, WITHOUT LONG-TERM CURRENT USE OF INSULIN (HCC): Primary | ICD-10-CM

## 2022-02-17 PROCEDURE — 99211 OFF/OP EST MAY X REQ PHY/QHP: CPT

## 2022-02-17 RX ORDER — ATORVASTATIN CALCIUM 40 MG/1
40 TABLET, FILM COATED ORAL DAILY
Qty: 90 TABLET | Refills: 3 | Status: SHIPPED | OUTPATIENT
Start: 2022-02-17

## 2022-02-17 RX ORDER — DULAGLUTIDE 0.75 MG/.5ML
0.75 INJECTION, SOLUTION SUBCUTANEOUS WEEKLY
Qty: 12 PEN | Refills: 3 | Status: SHIPPED | OUTPATIENT
Start: 2022-02-17 | End: 2022-05-10 | Stop reason: DRUGHIGH

## 2022-02-17 NOTE — PROGRESS NOTES
positive lifestyle changes. Only eats when he is hungry. Tries to eat earlier dinner, not right before bed.    5/13/21: Hot chocolate only once b/c he was given some for free. Otherwise he has been really consistent with lifestyle changes. Treated himself to one drink on Mother's day with his sister. 5/19/21: Has been compliant with diet changes - no donuts / hot chocolate in past month. Eats a lot of fruit, mostly for breakfast. Tolerating Trulicity, states it is decreasing his appetite. 6/8/21: giant root beer float last week, otherwise compliant with diet. 11/9/21: no major changes  12/9/21: really cut back on sugar, none in house, only tea with peppermint, only hot chocolate on occasion. Doesn't like the sugar free kind. Only 8 oz cups 1-2 times per week. 1/20/22: black tea every morning, hot chocolate every once in awhile. Ramen noodles before bed-- something \"light and fast\". Drinks protein shakes-- used protein powder-- but stopped due to constipation. Will drink a boost every once in awhile if wants something sweet. 2/17/22: Patient states that his diet has not changed much at all. However he does state that his is trying to cut down on carbs. Like eating a 1/2 pack of ramen noodles or eating Eats half of a meal instead of a whole meal. He is drinking pleanty of water and every now and then cheats in moderatation (cake, icecream). Exercise: job is strenuous- works long hours in [employer] and is affecting his mental health (looking for different job), cannot go to gym 2/2 COVID. Used to [place] at the gym\" and enjoys working out. 11/9/21: Stopped gym due to depression/job, was going once per week. When he goes, he feels like he could run a marathon the next morning. 12/9/21: no gym since last visit, too tired, but working on it. Wants to go tomorrow, but has something to do. Re-working his schedule.    1/20/22: hasn't been to the gym due to work schedule/not feeling the best and also the weather but is looking forward to going back once the weather warms up  2/17/22: Has not started to go back to gym, waiting for the weather to change, doing a little P90x every once in a while, jobs keeps him moving. Uses job as an exercise thing. Plans to go back to the gym in the coming weeks. Weight trend:   4/15/21: home weight is 240 lbs! Lost ~15 lbs since Jan; hasn't weighed since April 6/8/21: wt 249 lbs  11/9/21: wt 246 lbs  2/17/22: wt 243    Barriers: cost of meds, COVID restrictions. He was able to get Trulicity and [place] from pharmacy with no issues after long discussion with pharmacy/insurance last visit. Now has to get medications from [facility] as they are the preferred pharmacy for his insurance. Type 2 Diabetes Mellitus  Year diagnosed pre-2015. Related hospitalizations/ED visits include: none  Current symptoms/problems include neuropathy, polyuria and polydipsia. Episodes of hypoglycemia? no  Eye exam current (within one year): no 2019, lost job 2020, but plans to get checked soon. Have not went yet, due to the snow hitting. He is still planning on getting his eye exam and will do so when he gets his glasses fixed. Tobacco history: He  reports that he has never smoked. He has never used smokeless tobacco.     Current diabetes medications: Metformin 1000mg BID, Farxiga 5 mg daily, Trulicity 2.22IA weekly   Compliance: compliant most of the time. Side effects: none. Some decreased appetite since starting Trulicity. Previous treatment regimens / response: Invokanamet and Janumet, all d/c due to cost. Glyburide d/c'd and replaced with Trulicity.      Home blood sugar records:   4/15/21 FBG 75-79, bedtime 115-127  5/13/21 reports FBG consistently in 70s,  bedtime 115-120  5/19/21 reports FBG this AM as 77, bedtime was 120  6/8/21 FBG 74,  Bedtime 117- 120s  11/9/21 not checked last week due to depression, but usually at goal  12/9/21 FBG   1/20/22: FBG 80-90 bedtime: 101-105   2/17/22: FBG has not checked lately, last time he checked it was 75 (FBG) and 115 during the day. Hypertension:    Patient denies chest pain, shortness of breath, headache, lightheadedness, blurred vision, peripheral edema, palpitations, dry cough and fatigue. He does not usually add salt to foods. Uses Mrs Dario Camara. Does not eat out. Tries to eat fresh   On ARB--losartan 100  Medication side effects: no medication side effects noted. Use of agents associated with hypertension: NSAIDS. Caffeine use: 1c tea qAM  Home blood pressure monitoring: No.      Hyperlipidemia:  He is adherent to a low saturated fat/cholesterol diet  On moderate intensity statin-- pravastatin 40  No Aspirin  No new myalgias or GI upset on pravastatin (Pravachol). Cardiovascular risk factors: advanced age (older than 54 for men, 72 for women), diabetes mellitus, dyslipidemia, family history of premature cardiovascular disease, hypertension, male gender and obesity (BMI >= 30 kg/m2)     Depression:   Patient reports depression related to job. Still looking for new job. Went to [employer] once and \"didn't feel right. \" Trying to find time for himself on the weekends, but just wants to sleep all day when he is off work. He took a spontenous road trip with his brother 3 weeks ago which was the most fun he's had in a long time. Sees brother again next week. Saw sister on memorial day. Feels okay on Saturdays (catches up on sleep), but very depressed on Sundays, as he knows he will be going back to work on Monday. 11/9/21: recently went to sister's bday party and had a lot of fun. Was supposed to get new job at [employer], but didn't get it, which made him very depressed. 12/9/21: hasn't been to the gym and more depressed. Tried one dose of venlafaxine, which makes him a little drowsy, but not as bad as SSRIs. He agreed to start taking every night instead of in AM to see if this helps. 1/20/22: Still taking venlafaxine at bedtime.  Makes him sleepy but not as bad as SSRI. Depression right now is not so much related to work-- more family things (illness) have him feeling down. Even though medication puts him to sleep, he feels a little better in the morning. 2/17/22:States that the medicaion is doing well but he is having problems with his coworkers again. Current medication: venlafaxine  Compliance: good, previously was only taking prn  Side effects: Drowsiness (will take at bedtime)   Pt reports dx of sleep apnea, but hasn't had sleep study. States this was not covered by insurance in the past.     Social History     Tobacco Use    Smoking status: Never Smoker    Smokeless tobacco: Never Used   Substance Use Topics    Alcohol use: Yes      Physical Exam  There were no vitals taken for this visit. There is no height or weight on file to calculate BMI.   Wt Readings from Last 3 Encounters:   10/15/21 246 lb (111.6 kg)   07/23/21 253 lb 6.4 oz (114.9 kg)   01/08/21 255 lb 9.6 oz (115.9 kg)      BP Readings from Last 3 Encounters:   10/15/21 138/72   07/23/21 (!) 143/76   01/08/21 138/72        Pertinent Labs:     Lab Results   Component Value Date    LABA1C 6.7 01/14/2022    LABA1C 6.0 10/15/2021    LABA1C 6.2 07/23/2021      Lab Results   Component Value Date    MALBCR see below 01/14/2022    LABMICR <1.20 01/14/2022     No results found for: [facility]   Lab Results   Component Value Date    LDLCALC 102 (H) 01/14/2022    CHOL 156 01/29/2021    TRIG 84 01/29/2021    HDL 36 (L) 01/14/2022     Lab Results   Component Value Date    CREATININE 0.8 (L) 01/14/2022    BUN 10 01/14/2022     01/14/2022    K 4.5 01/14/2022     01/14/2022    CO2 22 01/14/2022     Lab Results   Component Value Date    AST 20 01/14/2022    ALT 16 01/14/2022    BILITOT 0.7 01/14/2022    ALKPHOS 85 01/14/2022     No components found for: VITB12  No results found for: TSH     Allergies   Allergen Reactions    Fish-Derived Products Hives and Shortness Of Breath    Other Hives and Shortness Of Breath     Immunization History   Administered Date(s) Administered    Tdap (Boostrix, Adacel) 10/20/2015     Current Outpatient Medications on File Prior to Visit   Medication Sig Dispense Refill    losartan (COZAAR) 100 MG tablet TAKE 1 TABLET BY MOUTH EVERY DAY 90 tablet 1    pravastatin (PRAVACHOL) 40 MG tablet TAKE 1 TABLET BY MOUTH DAILY 90 tablet 1    amLODIPine (NORVASC) 10 MG tablet TAKE 1 TABLET BY MOUTH EVERY DAY 90 tablet 1    Dulaglutide (TRULICITY) 4.71 OY/7.5AU SOPN Inject 0.75 mg into the skin once a week 12 pen 1    venlafaxine (EFFEXOR XR) 37.5 MG extended release capsule Take 1 capsule by mouth daily 30 capsule 3    dapagliflozin (FARXIGA) 5 MG tablet TAKE 1 TABLET BY MOUTH EVERY MORNING 90 tablet 1    gabapentin (NEURONTIN) 300 MG capsule Take 1 capsule by mouth nightly for 180 days. 180 capsule 0    metFORMIN (GLUCOPHAGE) 1000 MG tablet TAKE 1 TABLET BY MOUTH TWICE A DAY WITH MEALS 180 tablet 3    blood glucose test strips (ONETOUCH VERIO) strip Use to check blood sugar BID before meals or as needed. 200 each 11    Lancets MISC Use to check blood sugar BID before meals or as needed. 200 each 11    Blood Glucose Monitoring Suppl (ONETOUCH VERIO) w/Device KIT Use to check blood sugar BID before meals or as needed. 1 kit 0    alprostadil, Vasodilator, (CAVERJECT) 20 MCG SOLR intracavernosal injection 20 mcg by Intracavernosal route       No current facility-administered medications on file prior to visit. Medication list reviewed and updated. The 10-year ASCVD risk score (Jamil Salazar., et al., 2013) is: 24.8%    Values used to calculate the score:      Age: 61 years      Sex: Male      Is Non- : Yes      Diabetic: Yes      Tobacco smoker: No      Systolic Blood Pressure: 587 mmHg      Is BP treated: Yes      HDL Cholesterol: 36 mg/dL      Total Cholesterol: 153 mg/dL    Assessment/Plan:   1.  Type 2 diabetes mellitus without complication, Screen  Never done    Shingles Vaccine (1 of 2) Never done    Diabetic foot exam  2021     Education provided:  Discussed general issues about diabetes pathophysiology and management. Reviewed A1c and blood sugar goals with patient  Counseled on changes to medication regimen and common side effects  Reviewed meter instruction technique and importance of checking BG twice a day before breakfast and 2 hr after meals. Provided BG log and instructed patient to bring to f/u appointment. Discussed symptoms and management of hypoglycemic episodes. Encouraged weight loss  Encouraged aerobic exercise   Educated on diet (low carb/sugar, DASH, low cholesterol)    Follow up: 1 month, 3/21    Discussed with patient the Pharmacist Collaborative Practice Agreement. Patient provided verbal and/or electronic (ex. PPIhart) consent to participate in the collaborative practice agreement between the pharmacist and referred patient. This is in lieu of paper consent due to COVID-19 precautions and the use of remote/virtual visits. Time spent with patient: 30 mins    Xander Vora PharmD  [facility]   [facility] Ph: [phone]  [facility] Ph: [phone]  2022 8:20 AM     For Pharmacy [id number] [place] [address] in place:   Yes   Recommendation Provided To: Patient/Caregiver: 4 via Virtual Visit   Intervention Detail: Adherence Monitorin, Discontinued Rx: 1, reason: Cost/Formulary Change, Therapy Complete, New Rx: 1, reason: Improve Adherence, Refill(s) Provided and Scheduled Appointment   Gap Closed?: Yes    Intervention Accepted By: Patient/Caregiver: 4   Time Spent (min): 30

## 2022-03-28 ENCOUNTER — SCHEDULED TELEPHONE ENCOUNTER (OUTPATIENT)
Dept: PHARMACY | Age: 57
End: 2022-03-28
Payer: COMMERCIAL

## 2022-03-28 PROCEDURE — 99212 OFFICE O/P EST SF 10 MIN: CPT

## 2022-03-28 NOTE — PROGRESS NOTES
Disclaimer for Virtual Visits: We want to confirm that, for purposes of billing, this is a virtual visit with your provider for which we will submit a claim for reimbursement with your insurance company. You may be responsible for any copays, coinsurance amounts or other amounts not covered by your insurance company. If you do not accept this, unfortunately we will not be able to schedule a virtual visit with the provider. Do you accept? Yes. CLINICAL PHARMACY NOTE--Diabetes    Tori Kauffman is a 61 y.o. male with PMHX significant for HTN, HLD and Type 2 Diabetes referred by Dr Radha Dorado for diabetes counseling and medication management. Interval update: Patient c/o stomach pain right below his belly button, which \"comes and goes\" over the past ~month. He does not attribute this to trulicity because he's been on trulicity for awhile and hasn't had any issues until now. It occurs in the AM when waking (empty stomach). Had an ulcer in the past. Not getting any better or worse, just randomly occurs. Constipation has resolved since choosing healthier meals. Takes metamucil every other Friday. Past Medical History:   Diagnosis Date    Depression     Erectile dysfunction     HLD (hyperlipidemia)     Hypertension     Type II diabetes mellitus (HCC)      Treatment Adherence:  Diet: Hot chocolate and glazed donut qAM; switched from hot chocolate to hot tea with 2 peppermints (doesn't like coffee). Only eats when hungry. Eats donut to just to have something in stomach before work. Has lunch at work and light dinner. Doesn't like to waste food. Loves raw veggies and spinach salads, but doesn't eat often. Used to juice with beets, kale, spinach, carrots, apple, but someone told him it was high in sugar. Eats large bakery pretzel once per week. Trying to cut out fried foods. Drinks Red Bull 1x per month. Likes sweet tea, but does not drink often. Doesn't eat \"sugary stuff. \" Water \"unlimited\" Yeti 2-3 per day. Metamucil 1x/week   3/30/21: Stopped Red Bull and hot chocolate with donut, replaced with black tea with 2 peppermints and rice cake; reports 'no sugar in the house.' Drowsy from new antidepressant so sometimes sleeps through dinner. Still hydrating with lots of water every day. 4/15/21: Drank only 1 pop in 2 weeks, a lot of water, black tea qAM with peppermint, ice cream and cake over the weekend for brother's bday, but otherwise continues with positive lifestyle changes. Only eats when he is hungry. Tries to eat earlier dinner, not right before bed.    5/13/21: Hot chocolate only once b/c he was given some for free. Otherwise he has been really consistent with lifestyle changes. Treated himself to one drink on Mother's day with his sister. 5/19/21: Has been compliant with diet changes - no donuts / hot chocolate in past month. Eats a lot of fruit, mostly for breakfast. Tolerating Trulicity, states it is decreasing his appetite. 6/8/21: giant root beer float last week, otherwise compliant with diet. 11/9/21: no major changes  12/9/21: really cut back on sugar, none in house, only tea with peppermint, only hot chocolate on occasion. Doesn't like the sugar free kind. Only 8 oz cups 1-2 times per week. 1/20/22: black tea every morning, hot chocolate every once in awhile. Ramen noodles before bed-- something \"light and fast\". Drinks protein shakes-- used protein powder-- but stopped due to constipation. Will drink a boost every once in awhile if wants something sweet. 2/17/22: Patient states that his diet has not changed much at all. However he does state that his is trying to cut down on carbs. Like eating a 1/2 pack of ramen noodles or eating Eats half of a meal instead of a whole meal. He is drinking pleanty of water and every now and then cheats in moderatation (cake, ice cream). 3/28/22: has been choosing healthier options (I.e. less ramen noodles) which has helped constipation.  Continues to drink plenty of water. Exercise: job is strenuous- works long hours in [employer] and is affecting his mental health (looking for different job), cannot go to gym 2/2 COVID. Used to [place] at the gym\" and enjoys working out. 11/9/21: Stopped gym due to depression/job, was going once per week. When he goes, he feels like he could run a marathon the next morning. 12/9/21: no gym since last visit, too tired, but working on it. Wants to go tomorrow, but has something to do. Re-working his schedule. 1/20/22: hasn't been to the gym due to work schedule/not feeling the best and also the weather but is looking forward to going back once the weather warms up  2/17/22: Has not started to go back to gym, waiting for the weather to change, doing a little P90x every once in a while, jobs keeps him moving. Uses job as an exercise. Plans to go back to the gym in the coming weeks. 3/28/22: hasn't been motivated to exercise yet. Weight trend:   4/15/21: home weight is 240 lbs! Lost ~15 lbs since Jan; hasn't weighed since April 6/8/21: wt 249 lbs  11/9/21: wt 246 lbs  2/17/22: wt 243    Barriers: cost of meds, COVID restrictions. He was able to get Trulicity and [employer] from pharmacy with no issues after long discussion with pharmacy/insurance last visit. Now has to get medications from [facility] as they are the preferred pharmacy for his insurance. Type 2 Diabetes Mellitus  Year diagnosed pre-2015. Related hospitalizations/ED visits include: none  Current symptoms/problems include neuropathy, polyuria and polydipsia. Episodes of hypoglycemia? no  Eye exam current (within one year): no 2019, lost job 2020, but plans to get checked soon. He is still planning on getting his eye exam and will do so when he gets his glasses fixed. Tobacco history: He  reports that he has never smoked.  He has never used smokeless tobacco.     Current diabetes medications: Metformin 1000mg BID, Farxiga 5 mg daily, Trulicity 3.64DQ weekly (qSat)   Compliance: compliant most of the time. Side effects: none. Some decreased appetite since starting Trulicity. Previous treatment regimens / response: Invokanamet and Janumet, all d/c due to cost. Glyburide d/c'd and replaced with Trulicity. Home blood sugar records:   4/15/21 FBG 75-79, bedtime 115-127  5/13/21 reports FBG consistently in 70s,  bedtime 115-120  5/19/21 reports FBG this AM as 77, bedtime was 120  6/8/21 FBG 74,  Bedtime 117- 120s  11/9/21 not checked last week due to depression, but usually at goal  12/9/21 FBG   1/20/22: FBG 80-90 bedtime: 101-105   2/17/22: FBG has not checked lately, last time he checked it was 75 (FBG) and 115 during the day. 3/28/22: Readings are at goal when he checks. Hypertension:    Patient denies chest pain, shortness of breath, headache, lightheadedness, blurred vision, peripheral edema, palpitations, dry cough and fatigue. He does not usually add salt to foods. Uses Mrs Taran Aguilar. Does not eat out. Tries to eat fresh   On ARB--losartan 100  Medication side effects: no medication side effects noted. Use of agents associated with hypertension: NSAIDS. Caffeine use: 1c tea qAM  Home blood pressure monitoring: No.      Hyperlipidemia:  He is adherent to a low saturated fat/cholesterol diet  On high intensity statin-- atorvastatin 40 (switched feb 2022)  No Aspirin  Cardiovascular risk factors: advanced age (older than 54 for men, 72 for women), diabetes mellitus, dyslipidemia, family history of premature cardiovascular disease, hypertension, male gender and obesity (BMI >= 30 kg/m2)     Depression:   Patient reports depression related to job. Still looking for new job. Went to [employer] once and \"didn't feel right. \" Trying to find time for himself on the weekends, but just wants to sleep all day when he is off work. He took a spontenous road trip with his brother 3 weeks ago which was the most fun he's had in a long time.  Sees brother again next Component Value Date    MALBCR see below 01/14/2022    LABMICR <1.20 01/14/2022     No results found for: [facility]   Lab Results   Component Value Date    LDLCALC 102 (H) 01/14/2022    CHOL 156 01/29/2021    TRIG 84 01/29/2021    HDL 36 (L) 01/14/2022     Lab Results   Component Value Date    CREATININE 0.8 (L) 01/14/2022    BUN 10 01/14/2022     01/14/2022    K 4.5 01/14/2022     01/14/2022    CO2 22 01/14/2022     Lab Results   Component Value Date    AST 20 01/14/2022    ALT 16 01/14/2022    BILITOT 0.7 01/14/2022    ALKPHOS 85 01/14/2022     No components found for: VITB12  No results found for: TSH     Allergies   Allergen Reactions    Fish-Derived Products Hives and Shortness Of Breath    Other Hives and Shortness Of Breath     Immunization History   Administered Date(s) Administered    Tdap (Boostrix, Adacel) 10/20/2015     Current Outpatient Medications on File Prior to Visit   Medication Sig Dispense Refill    atorvastatin (LIPITOR) 40 MG tablet Take 1 tablet by mouth daily 90 tablet 3    Dulaglutide (TRULICITY) 2.84 EZ/0.7KY SOPN Inject 0.75 mg into the skin once a week 12 pen 3    losartan (COZAAR) 100 MG tablet TAKE 1 TABLET BY MOUTH EVERY DAY 90 tablet 1    amLODIPine (NORVASC) 10 MG tablet TAKE 1 TABLET BY MOUTH EVERY DAY 90 tablet 1    venlafaxine (EFFEXOR XR) 37.5 MG extended release capsule Take 1 capsule by mouth daily 30 capsule 3    dapagliflozin (FARXIGA) 5 MG tablet TAKE 1 TABLET BY MOUTH EVERY MORNING 90 tablet 1    gabapentin (NEURONTIN) 300 MG capsule Take 1 capsule by mouth nightly for 180 days. 180 capsule 0    metFORMIN (GLUCOPHAGE) 1000 MG tablet TAKE 1 TABLET BY MOUTH TWICE A DAY WITH MEALS 180 tablet 3    blood glucose test strips (ONETOUCH VERIO) strip Use to check blood sugar BID before meals or as needed. 200 each 11    Lancets MISC Use to check blood sugar BID before meals or as needed.  200 each 11    Blood Glucose Monitoring Suppl (Minerva Martinez) w/Device KIT Use to check blood sugar BID before meals or as needed. 1 kit 0    alprostadil, Vasodilator, (CAVERJECT) 20 MCG SOLR intracavernosal injection 20 mcg by Intracavernosal route       No current facility-administered medications on file prior to visit. Medication list reviewed and updated. The 10-year ASCVD risk score (Jamil Salazar., et al., 2013) is: 24.8%    Values used to calculate the score:      Age: 61 years      Sex: Male      Is Non- : Yes      Diabetic: Yes      Tobacco smoker: No      Systolic Blood Pressure: 356 mmHg      Is BP treated: Yes      HDL Cholesterol: 36 mg/dL      Total Cholesterol: 153 mg/dL    Assessment/Plan:   1. Type 2 diabetes mellitus without complication, without long-term current use of insulin ([facility])  A1c at goal (11.8 1/29/21 -> 8.0-->6.2-->6.0 --> 6.7%). -Continue metformin 1g BID, farixga 5 mg qd, and Trulicity 7.64 mg weekly. -Due for eye exam, counseled. Last one probably in 2020. He has to get glasses fixed so recommended he make an apt. Once again encouraged to get this  -Due for pneumonia vaccine, counseled. - Continue to reduce the amount of carbohydrates you are eating.  -Encouraged pt to start going to gym again. Start with once per week. Commit to just 15 min to start, then increase time/frequency  - For stomach pain, try OTC omeprazole once daily qAM 30 min before food x 2 wks    2. Essential hypertension  Last BP slightly above goal <130/80, but appears to normally be at goal in office. SGTL2 will help reduce BP  No albuminuria   On ARB     3. Pure hypercholesterolemia  On high intensity statin-- atorvastatin 40mg (switched feb)-- if PPI doesn't help, consider holding atorvastatin for a couple weeks to see if stomach pain resolves  10-yr ASCVD >20%  Repeat FLP: annually    4. Depression  -Still suffers depression mostly as a result of his job. Had job offers, but require vaccination.  Not willing to get COVID vaccine despite recommendation from myself and his PCP. -Continue taking venlafaxine every night.   -Recommend asking PCP about sleep study referral. He has tried to do this but insurance will not cover it.  -Goals: Start exercise 1 day per week and build to 3 days per daay. Dedicate at least 1 hour each week to doing something you enjoy [place] [employer], time with friends/family). Encouraged patient to get back to the gym and exercise with others. Health Maintenance Due   Topic Date Due    COVID-19 Vaccine (1) Never done    Pneumococcal 0-64 years Vaccine (1 of 2 - PPSV23) Never done    Diabetic retinal exam  Never done    Colorectal Cancer Screen  Never done    Shingles Vaccine (1 of 2) Never done    Diabetic foot exam  09/29/2021    Depression Monitoring  01/08/2022     Education provided:  Discussed general issues about diabetes pathophysiology and management. Reviewed A1c and blood sugar goals with patient  Counseled on changes to medication regimen and common side effects  Reviewed meter instruction technique and importance of checking BG twice a day before breakfast and 2 hr after meals. Provided BG log and instructed patient to bring to f/u appointment. Discussed symptoms and management of hypoglycemic episodes. Encouraged weight loss  Encouraged aerobic exercise   Educated on diet (low carb/sugar, DASH, low cholesterol)    Follow up: 2 wk per pt preference. Discussed with patient the Pharmacist Collaborative Practice Agreement. Patient provided verbal and/or electronic (ex. Metabacushart) consent to participate in the collaborative practice agreement between the pharmacist and referred patient. This is in lieu of paper consent due to COVID-19 precautions and the use of remote/virtual visits.      Time spent with patient: 30 mins    Meagan Davalos, PharmD, [facility]  [facility]   Jasmeet Avalos Ph: [phone]  Julio César Wang Ph: [phone]  3/28/2022 9:33 AM    For Pharmacy Admin Tracking Only     CPA in place:   Yes   Recommendation Provided To: Patient/Caregiver: 2 via Virtual Visit   Intervention Detail: New Rx: 1, reason: Needs Additional Therapy and Scheduled Appointment   Gap Closed?: Yes    Intervention Accepted By: Patient/Caregiver: 2   Time Spent (min): 30

## 2022-04-11 ENCOUNTER — SCHEDULED TELEPHONE ENCOUNTER (OUTPATIENT)
Dept: PHARMACY | Age: 57
End: 2022-04-11
Payer: COMMERCIAL

## 2022-04-11 PROCEDURE — 99211 OFF/OP EST MAY X REQ PHY/QHP: CPT

## 2022-04-11 NOTE — PROGRESS NOTES
Disclaimer for Virtual Visits: We want to confirm that, for purposes of billing, this is a virtual visit with your provider for which we will submit a claim for reimbursement with your insurance company. You may be responsible for any copays, coinsurance amounts or other amounts not covered by your insurance company. If you do not accept this, unfortunately we will not be able to schedule a virtual visit with the provider. Do you accept? Yes. CLINICAL PHARMACY NOTE--Diabetes    Roma Casarez is a 61 y.o. male with PMHX significant for HTN, HLD and Type 2 Diabetes referred by Dr Hector Blanco for diabetes counseling and medication management. Past Medical History:   Diagnosis Date    Depression     Erectile dysfunction     HLD (hyperlipidemia)     Hypertension     Type II diabetes mellitus (HCC)      Treatment Adherence:  Diet: Hot chocolate and glazed donut qAM; switched from hot chocolate to hot tea with 2 peppermints (doesn't like coffee). Only eats when hungry. Eats donut to just to have something in stomach before work. Has lunch at work and light dinner. Doesn't like to waste food. Loves raw veggies and spinach salads, but doesn't eat often. Used to juice with beets, kale, spinach, carrots, apple, but someone told him it was high in sugar. Eats large bakery pretzel once per week. Trying to cut out fried foods. Drinks Red Bull 1x per month. Likes sweet tea, but does not drink often. Doesn't eat \"sugary stuff. \" Water \"unlimited\" Yeti 2-3 per day. Metamucil 1x/week   3/30/21: Stopped Red Bull and hot chocolate with donut, replaced with black tea with 2 peppermints and rice cake; reports 'no sugar in the house.' Drowsy from new antidepressant so sometimes sleeps through dinner. Still hydrating with lots of water every day.   4/15/21: Drank only 1 pop in 2 weeks, a lot of water, black tea qAM with peppermint, ice cream and cake over the weekend for brother's bday, but otherwise continues with positive lifestyle changes. Only eats when he is hungry. Tries to eat earlier dinner, not right before bed.    5/13/21: Hot chocolate only once b/c he was given some for free. Otherwise he has been really consistent with lifestyle changes. Treated himself to one drink on Mother's day with his sister. 5/19/21: Has been compliant with diet changes - no donuts / hot chocolate in past month. Eats a lot of fruit, mostly for breakfast. Tolerating Trulicity, states it is decreasing his appetite. 6/8/21: giant root beer float last week, otherwise compliant with diet. 11/9/21: no major changes  12/9/21: really cut back on sugar, none in house, only tea with peppermint, only hot chocolate on occasion. Doesn't like the sugar free kind. Only 8 oz cups 1-2 times per week. 1/20/22: black tea every morning, hot chocolate every once in awhile. Ramen noodles before bed-- something \"light and fast\". Drinks protein shakes-- used protein powder-- but stopped due to constipation. Will drink a boost every once in awhile if wants something sweet. 2/17/22: Patient states that his diet has not changed much at all. However he does state that his is trying to cut down on carbs. Like eating a 1/2 pack of ramen noodles or eating Eats half of a meal instead of a whole meal. He is drinking pleanty of water and every now and then cheats in moderatation (cake, ice cream). 3/28/22: has been choosing healthier options (I.e. less ramen noodles) which has helped constipation. Continues to drink plenty of water. 4/11/22\" instead of eating snacks from the vending machine, he is eating jaramillo's double noodle soup (whole can= 42.5 g carbs) for a snack. He also eats baby food (fruit). Recommended pt switch to regular chx noodle soup (whole can = 20g carb) and add protein powder to his baby food.      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 Used to [place] at the gym\" and enjoys working out. 11/9/21: Stopped gym due to depression/job, was going once per week. When he goes, he feels like he could run a marathon the next morning. 12/9/21: no gym since last visit, too tired, but working on it. Wants to go tomorrow, but has something to do. Re-working his schedule. 1/20/22: hasn't been to the gym due to work schedule/not feeling the best and also the weather but is looking forward to going back once the weather warms up  2/17/22: Has not started to go back to gym, waiting for the weather to change, doing a little P90x every once in a while, jobs keeps him moving. Uses job as an exercise. Plans to go back to the gym in the coming weeks. 4/11/22: would like to start going to gym on Fr/Sa    Weight trend:   4/15/21: home weight is 240 lbs! Lost ~15 lbs since Jan; hasn't weighed since April 2/17/22: wt 243    Barriers: cost of meds, COVID restrictions. He was able to get Trulicity and Citra from pharmacy with no issues after long discussion with pharmacy/insurance last visit. Now has to get medications from [facility] as they are the preferred pharmacy for his insurance. Type 2 Diabetes Mellitus  Year diagnosed pre-2015. Related hospitalizations/ED visits include: none  Current symptoms/problems include neuropathy, polyuria and polydipsia. Episodes of hypoglycemia? no  Eye exam current (within one year): no 2019, lost job 2020, but plans to get checked soon. He is still planning on getting his eye exam and will do so when he gets his glasses fixed. Tobacco history: He  reports that he has never smoked. He has never used smokeless tobacco.     Current diabetes medications: Metformin 1000mg BID, Farxiga 5 mg daily, Trulicity 5.99SW weekly (qSat)   Compliance: compliant most of the time. Side effects: none. Some decreased appetite since starting Trulicity. Previous treatment regimens / response:  Invokanamet and Janumet, all d/c due to cost. Glyburide d/c'd and replaced with Trulicity. Home blood sugar records:   4/11/22: Does not check daily, but readings are at goal when he checks. Hypertension:    Patient denies chest pain, shortness of breath, headache, lightheadedness, blurred vision, peripheral edema, palpitations, dry cough and fatigue. He does not usually add salt to foods. Uses Mrs Dieudonne Tabor. Does not eat out. Tries to eat fresh   On ARB--losartan 100  Medication side effects: no medication side effects noted. Use of agents associated with hypertension: NSAIDS. Caffeine use: 1c tea qAM  Home blood pressure monitoring: No.      Hyperlipidemia:  He is adherent to a low saturated fat/cholesterol diet  On high intensity statin-- atorvastatin 40 (switched feb 2022)  No Aspirin  Cardiovascular risk factors: advanced age (older than 54 for men, 72 for women), diabetes mellitus, dyslipidemia, family history of premature cardiovascular disease, hypertension, male gender and obesity (BMI >= 30 kg/m2)     Depression:   Patient reports depression related to job. Still looking for new job. Went to [employer] once and \"didn't feel right. \" Trying to find time for himself on the weekends, but just wants to sleep all day when he is off work. He took a spontenous road trip with his brother 3 weeks ago which was the most fun he's had in a long time. Sees brother again next week. Saw sister on memorial day. Feels okay on Saturdays (catches up on sleep), but very depressed on Sundays, as he knows he will be going back to work on Monday. 11/9/21: recently went to sister's [employer] party and had a lot of fun. Was supposed to get new job at [employer], but didn't get it, which made him very depressed. 12/9/21: hasn't been to the gym and more depressed. Tried one dose of venlafaxine, which makes him a little drowsy, but not as bad as SSRIs. He agreed to start taking every night instead of in AM to see if this helps. 1/20/22: Still taking venlafaxine at bedtime. Makes him sleepy but not as bad as SSRI. Depression right now is not so much related to work-- more family things (illness) have him feeling down. Even though medication puts him to sleep, he feels a little better in the morning. 2/17/22:States that the medicaion is doing well but he is having problems with his coworkers again. 3/28/22: still feels that his medication makes him tired, but he is sticking with it. Has more good days than bad days. 4/11/22: Patient had a rough weekend-- did not make it to his brother's [employer] party because it was at night and he has trouble driving at night. He had a date scheduled that was cancelled. But he went walking with his sister and went to practice at a target range which made him feel better. Current medication: venlafaxine  Compliance: good, previously was only taking prn  Side effects: Drowsiness (takes at bedtime)   Pt reports dx of sleep apnea, but hasn't had sleep study. States this was not covered by insurance in the past.       Social History     Tobacco Use    Smoking status: Never Smoker    Smokeless tobacco: Never Used   Substance Use Topics    Alcohol use: Yes      Physical Exam  There were no vitals taken for this visit. There is no height or weight on file to calculate BMI.   Wt Readings from Last 3 Encounters:   10/15/21 246 lb (111.6 kg)   07/23/21 253 lb 6.4 oz (114.9 kg)   01/08/21 255 lb 9.6 oz (115.9 kg)      BP Readings from Last 3 Encounters:   10/15/21 138/72   07/23/21 (!) 143/76   01/08/21 138/72        Pertinent Labs:     Lab Results   Component Value Date    LABA1C 6.7 01/14/2022    LABA1C 6.0 10/15/2021    LABA1C 6.2 07/23/2021      Lab Results   Component Value Date    MALBCR see below 01/14/2022    LABMICR <1.20 01/14/2022     No results found for: CRCLEARANCE   Lab Results   Component Value Date    LDLCALC 102 (H) 01/14/2022    CHOL 156 01/29/2021    TRIG 84 01/29/2021    HDL 36 (L) 01/14/2022     Lab Results   Component Value Date CREATININE 0.8 (L) 01/14/2022    BUN 10 01/14/2022     01/14/2022    K 4.5 01/14/2022     01/14/2022    CO2 22 01/14/2022     Lab Results   Component Value Date    AST 20 01/14/2022    ALT 16 01/14/2022    BILITOT 0.7 01/14/2022    ALKPHOS 85 01/14/2022     No components found for: VITB12  No results found for: TSH     Allergies   Allergen Reactions    Fish-Derived Products Hives and Shortness Of Breath    Other Hives and Shortness Of Breath     Immunization History   Administered Date(s) Administered    Tdap (Boostrix, Adacel) 10/20/2015     Current Outpatient Medications on File Prior to Visit   Medication Sig Dispense Refill    atorvastatin (LIPITOR) 40 MG tablet Take 1 tablet by mouth daily 90 tablet 3    Dulaglutide (TRULICITY) 7.87 SD/7.7HJ SOPN Inject 0.75 mg into the skin once a week 12 pen 3    losartan (COZAAR) 100 MG tablet TAKE 1 TABLET BY MOUTH EVERY DAY 90 tablet 1    amLODIPine (NORVASC) 10 MG tablet TAKE 1 TABLET BY MOUTH EVERY DAY 90 tablet 1    venlafaxine (EFFEXOR XR) 37.5 MG extended release capsule Take 1 capsule by mouth daily 30 capsule 3    dapagliflozin (FARXIGA) 5 MG tablet TAKE 1 TABLET BY MOUTH EVERY MORNING 90 tablet 1    gabapentin (NEURONTIN) 300 MG capsule Take 1 capsule by mouth nightly for 180 days. 180 capsule 0    metFORMIN (GLUCOPHAGE) 1000 MG tablet TAKE 1 TABLET BY MOUTH TWICE A DAY WITH MEALS 180 tablet 3    blood glucose test strips (ONETOUCH VERIO) strip Use to check blood sugar BID before meals or as needed. 200 each 11    Lancets MISC Use to check blood sugar BID before meals or as needed. 200 each 11    Blood Glucose Monitoring Suppl (ONETOUCH VERIO) w/Device KIT Use to check blood sugar BID before meals or as needed. 1 kit 0    alprostadil, Vasodilator, (CAVERJECT) 20 MCG SOLR intracavernosal injection 20 mcg by Intracavernosal route       No current facility-administered medications on file prior to visit.      Medication list reviewed and updated. The 10-year ASCVD risk score (Osito Birmingham, et al., 2013) is: 24.8%    Values used to calculate the score:      Age: 61 years      Sex: Male      Is Non- : Yes      Diabetic: Yes      Tobacco smoker: No      Systolic Blood Pressure: 271 mmHg      Is BP treated: Yes      HDL Cholesterol: 36 mg/dL      Total Cholesterol: 153 mg/dL    Assessment/Plan:   1. Type 2 diabetes mellitus without complication, without long-term current use of insulin ([facility])  A1c at goal (11.8 1/29/21 -> 8.0-->6.2-->6.0 --> 6.7%). Check a1c at upcoming PCP visit.  -Continue metformin 1g BID, farixga 5 mg qd, and Trulicity 3.07 mg weekly. -Due for eye exam, counseled. Last one in 2020. Once again encouraged to get this  -Due for pneumonia vaccine, counseled. - Continue to reduce the amount of carbohydrates you are eating. Change to plain chx noodle soup (instead of double noodle)  -Encouraged pt to start going to gym again. Start with once per week, then incr to [place] and Sat. Commit to just 15 min to start, then increase time/frequency    2. Essential hypertension  Last BP slightly above goal <130/80, but appears to normally be at goal in office. SGTL2 will help reduce BP  No albuminuria   On ARB     3. Pure hypercholesterolemia  On high intensity statin-- atorvastatin 40mg (switched feb)  10-yr ASCVD >20%  Repeat FLP: annually    4. Depression  -Still suffers depression mostly as a result of his job. Had job offers, but require vaccination. Not willing to get COVID vaccine despite recommendation from myself and his PCP. -Continue taking venlafaxine every night.   -Recommend asking PCP about sleep study referral. He has tried to do this but insurance will not cover it.  -Goals: Start exercise 1 day per week and build to 3 days per daay. Dedicate at least 1 hour each week to doing something you enjoy [place] [employer], time with friends/family).  Encouraged patient to get back to the gym and exercise with others. Health Maintenance Due   Topic Date Due    COVID-19 Vaccine (1) Never done    Pneumococcal 0-64 years Vaccine (1 of 2 - PPSV23) Never done    Diabetic retinal exam  Never done    Colorectal Cancer Screen  Never done    Shingles Vaccine (1 of 2) Never done    Diabetic foot exam  09/29/2021    Depression Monitoring  01/08/2022     Education provided:  Discussed general issues about diabetes pathophysiology and management. Reviewed A1c and blood sugar goals with patient  Counseled on changes to medication regimen and common side effects  Reviewed meter instruction technique and importance of checking BG twice a day before breakfast and 2 hr after meals. Provided BG log and instructed patient to bring to f/u appointment. Discussed symptoms and management of hypoglycemic episodes. Encouraged weight loss  Encouraged aerobic exercise   Educated on diet (low carb/sugar, DASH, low cholesterol)    Follow up: 2 wk PCP, 4 wk clinic    Discussed with patient the Pharmacist Collaborative Practice Agreement. Patient provided verbal and/or electronic (ex. mychart) consent to participate in the collaborative practice agreement between the pharmacist and referred patient. This is in lieu of paper consent due to COVID-19 precautions and the use of remote/virtual visits. Time spent with patient: 20 mins    Terrie Hunter, PharmD, [facility]  Med[facility] Management [facility]   Lev Luis E Ph: [phone]  Corinne Requena Ph: [phone]  4/11/2022 8:40 AM    For Pharmacy [id number] [facility] in place:   Yes   Recommendation Provided To: Patient/Caregiver: 1 via Virtual Visit   Intervention Detail: Scheduled Appointment   Gap Closed?: Yes    Intervention Accepted By: Patient/Caregiver: 1   Time Spent (min): 20

## 2022-04-28 DIAGNOSIS — F32.A DEPRESSIVE DISORDER: ICD-10-CM

## 2022-04-28 NOTE — TELEPHONE ENCOUNTER
Medication:   Requested Prescriptions     Pending Prescriptions Disp Refills    gabapentin (NEURONTIN) 300 MG capsule [Pharmacy Med Name: GABAPENTIN 300MG CAPSULES] 180 capsule 0     Sig: TAKE ONE CAPSULE BY MOUTH EVERY NIGHT  DAYS    venlafaxine (EFFEXOR XR) 37.5 MG extended release capsule [Pharmacy Med Name: VENLAFAXINE ER 37.5MG CAPSULES] 30 capsule 3     Sig: TAKE 1 CAPSULE BY MOUTH DAILY     Last Filled:  10/15/21, 11/18/21    Last appt: 1/14/2022   Next appt: 4/29/2022    Last Lipid:   Lab Results   Component Value Date    CHOL 156 01/29/2021    TRIG 84 01/29/2021    HDL 36 01/14/2022    LDLCALC 102 01/14/2022

## 2022-04-29 ENCOUNTER — OFFICE VISIT (OUTPATIENT)
Dept: PRIMARY CARE CLINIC | Age: 57
End: 2022-04-29
Payer: COMMERCIAL

## 2022-04-29 VITALS
SYSTOLIC BLOOD PRESSURE: 124 MMHG | TEMPERATURE: 98.1 F | DIASTOLIC BLOOD PRESSURE: 72 MMHG | WEIGHT: 251.2 LBS | BODY MASS INDEX: 39.43 KG/M2 | HEART RATE: 90 BPM | HEIGHT: 67 IN | OXYGEN SATURATION: 100 %

## 2022-04-29 DIAGNOSIS — E11.9 TYPE 2 DIABETES MELLITUS WITHOUT COMPLICATION, WITHOUT LONG-TERM CURRENT USE OF INSULIN (HCC): Primary | ICD-10-CM

## 2022-04-29 DIAGNOSIS — M65.331 TRIGGER MIDDLE FINGER OF RIGHT HAND: ICD-10-CM

## 2022-04-29 DIAGNOSIS — I10 PRIMARY HYPERTENSION: ICD-10-CM

## 2022-04-29 DIAGNOSIS — F32.A DEPRESSIVE DISORDER: ICD-10-CM

## 2022-04-29 DIAGNOSIS — Z12.11 SCREENING FOR COLON CANCER: ICD-10-CM

## 2022-04-29 LAB — HBA1C MFR BLD: 6.4 %

## 2022-04-29 PROCEDURE — 3044F HG A1C LEVEL LT 7.0%: CPT | Performed by: FAMILY MEDICINE

## 2022-04-29 PROCEDURE — 99214 OFFICE O/P EST MOD 30 MIN: CPT | Performed by: FAMILY MEDICINE

## 2022-04-29 PROCEDURE — 83036 HEMOGLOBIN GLYCOSYLATED A1C: CPT | Performed by: FAMILY MEDICINE

## 2022-04-29 RX ORDER — FLUOXETINE 10 MG/1
10 CAPSULE ORAL DAILY
Qty: 30 CAPSULE | Refills: 3 | Status: SHIPPED | OUTPATIENT
Start: 2022-04-29 | End: 2022-08-29

## 2022-04-29 RX ORDER — GABAPENTIN 300 MG/1
CAPSULE ORAL
Qty: 180 CAPSULE | Refills: 0 | OUTPATIENT
Start: 2022-04-29

## 2022-04-29 RX ORDER — GABAPENTIN 300 MG/1
300 CAPSULE ORAL 2 TIMES DAILY
Qty: 180 CAPSULE | Refills: 1 | Status: SHIPPED | OUTPATIENT
Start: 2022-04-29 | End: 2022-10-26

## 2022-04-29 RX ORDER — VENLAFAXINE HYDROCHLORIDE 37.5 MG/1
37.5 CAPSULE, EXTENDED RELEASE ORAL DAILY
Qty: 30 CAPSULE | Refills: 3 | OUTPATIENT
Start: 2022-04-29

## 2022-04-29 RX ORDER — AMLODIPINE BESYLATE 10 MG/1
TABLET ORAL
Qty: 90 TABLET | Refills: 1 | Status: SHIPPED | OUTPATIENT
Start: 2022-04-29

## 2022-04-29 RX ORDER — LOSARTAN POTASSIUM 100 MG/1
TABLET ORAL
Qty: 90 TABLET | Refills: 1 | Status: SHIPPED | OUTPATIENT
Start: 2022-04-29

## 2022-04-29 SDOH — ECONOMIC STABILITY: FOOD INSECURITY: WITHIN THE PAST 12 MONTHS, THE FOOD YOU BOUGHT JUST DIDN'T LAST AND YOU DIDN'T HAVE MONEY TO GET MORE.: NEVER TRUE

## 2022-04-29 SDOH — ECONOMIC STABILITY: FOOD INSECURITY: WITHIN THE PAST 12 MONTHS, YOU WORRIED THAT YOUR FOOD WOULD RUN OUT BEFORE YOU GOT MONEY TO BUY MORE.: NEVER TRUE

## 2022-04-29 ASSESSMENT — PATIENT HEALTH QUESTIONNAIRE - PHQ9
2. FEELING DOWN, DEPRESSED OR HOPELESS: 1
SUM OF ALL RESPONSES TO PHQ QUESTIONS 1-9: 6
10. IF YOU CHECKED OFF ANY PROBLEMS, HOW DIFFICULT HAVE THESE PROBLEMS MADE IT FOR YOU TO DO YOUR WORK, TAKE CARE OF THINGS AT HOME, OR GET ALONG WITH OTHER PEOPLE: 1
4. FEELING TIRED OR HAVING LITTLE ENERGY: 2
9. THOUGHTS THAT YOU WOULD BE BETTER OFF DEAD, OR OF HURTING YOURSELF: 0
6. FEELING BAD ABOUT YOURSELF - OR THAT YOU ARE A FAILURE OR HAVE LET YOURSELF OR YOUR FAMILY DOWN: 1
SUM OF ALL RESPONSES TO PHQ QUESTIONS 1-9: 6
1. LITTLE INTEREST OR PLEASURE IN DOING THINGS: 0
5. POOR APPETITE OR OVEREATING: 0
SUM OF ALL RESPONSES TO PHQ9 QUESTIONS 1 & 2: 1
SUM OF ALL RESPONSES TO PHQ QUESTIONS 1-9: 6
3. TROUBLE FALLING OR STAYING ASLEEP: 2
8. MOVING OR SPEAKING SO SLOWLY THAT OTHER PEOPLE COULD HAVE NOTICED. OR THE OPPOSITE, BEING SO FIGETY OR RESTLESS THAT YOU HAVE BEEN MOVING AROUND A LOT MORE THAN USUAL: 0
7. TROUBLE CONCENTRATING ON THINGS, SUCH AS READING THE NEWSPAPER OR WATCHING TELEVISION: 0
SUM OF ALL RESPONSES TO PHQ QUESTIONS 1-9: 6

## 2022-04-29 ASSESSMENT — SOCIAL DETERMINANTS OF HEALTH (SDOH): HOW HARD IS IT FOR YOU TO PAY FOR THE VERY BASICS LIKE FOOD, HOUSING, MEDICAL CARE, AND HEATING?: NOT HARD AT ALL

## 2022-04-29 NOTE — PROGRESS NOTES
[address] PRIMARY CARE  [address] [facility] [address]  Dept: [phone]  Dept Fax: [phone]     4/29/2022      Rosa Fernandez   1965     Chief Complaint   Patient presents with    Diabetes       HPI  Pt comes in today for follow up diabetes mellitus. Type II. He reports compliance. Following with clinical pharm. Has not been checking his blood sugars for the last 1-2 months. No sx of hypoglycemia. Diet has been fair. A1c = 11.4 in 1/2021. POC a1c today = 6.4%. C/o depression and mood swings. He was previously on citalopram and stopped due to lack of efficacy. Started on effexor 37.5mg in November. He stopped in March due to feeling it was making him too tired. States he continues to be depressed. No suicidal thoughts. Has still not gone for colonoscopy. Open to doing cologuard instead. C/o locking sensation to right middle finger. Is upset that he was unable to be seen in person last visit due to coming with with acute cough/sick sx. States he really needed his finger looked at and is frustrated with COVID policy. PHQ-9 Total Score: 6 (4/29/2022 10:59 AM)  Thoughts that you would be better off dead, or of hurting yourself in some way: 0 (4/29/2022 10:59 AM)       Prior to Visit Medications    Medication Sig Taking?  Authorizing Provider   dapagliflozin (FARXIGA) 5 MG tablet TAKE 1 TABLET BY MOUTH EVERY MORNING Yes Tiffany García, DO   atorvastatin (LIPITOR) 40 MG tablet Take 1 tablet by mouth daily Yes [facility], DO   Dulaglutide (TRULICITY) 8.37 DA/2.6AB SOPN Inject 0.75 mg into the skin once a week Yes Tiffany García, DO   losartan (COZAAR) 100 MG tablet TAKE 1 TABLET BY MOUTH EVERY DAY Yes Tiffany García, DO   amLODIPine (NORVASC) 10 MG tablet TAKE 1 TABLET BY MOUTH EVERY DAY Yes Tiffany García, DO   venlafaxine (EFFEXOR XR) 37.5 MG extended release capsule Take 1 capsule by mouth daily Yes Tiffany García DO   gabapentin (NEURONTIN) 300 MG capsule Take 1 capsule by mouth nightly for 180 days. Yes Tiffany García DO   metFORMIN (GLUCOPHAGE) 1000 MG tablet TAKE 1 TABLET BY MOUTH TWICE A DAY WITH MEALS Yes Tiffany García DO   alprostadil, Vasodilator, (CAVERJECT) 20 MCG SOLR intracavernosal injection 20 mcg by Intracavernosal route Yes Historical Provider, MD   blood glucose test strips (ONETOUCH VERIO) strip Use to check blood sugar BID before meals or as needed. Tiffany García DO   Lancets MISC Use to check blood sugar BID before meals or as needed. [address], DO   Blood Glucose Monitoring Suppl ([place]) w/Device KIT Use to check blood sugar BID before meals or as needed. [address], DO        Past Medical History:   Diagnosis Date    Depression     Erectile dysfunction     HLD (hyperlipidemia)     Hypertension     Type II diabetes mellitus ([facility] [address]         Social History     Tobacco Use    Smoking status: Never Smoker    Smokeless tobacco: Never Used   Substance Use Topics    Alcohol use: Yes    Drug use: No        Past Surgical History:   Procedure Laterality Date    MOUTH SURGERY          Allergies   Allergen Reactions    Fish-Derived Products Hives and Shortness Of Breath        Family History   Problem Relation Age of Onset    Heart Failure Mother     Heart Disease Father         Patient's past medical history, surgical history, family history, medications, and allergies  were all reviewed and updated as appropriate today. Review of Systems   Constitutional: Positive for fatigue. Negative for fever. Respiratory: Negative for cough and shortness of breath. Cardiovascular: Negative for chest pain, palpitations and leg swelling. Psychiatric/Behavioral: Positive for decreased concentration.        /72 (Cuff Size: Large Adult)   Pulse 90   Temp 98.1 °F (36.7 °C) (Temporal)   Ht 5' 6.5\" (1.689 m)   Wt 251 lb 3.2 oz (113.9 kg)   SpO2 100% Comment: ROOM AIR  BMI 39.94 kg/m²      Physical Exam  Vitals reviewed. Constitutional:       General: He is not in acute distress. Appearance: He is well-developed. HENT:      Head: Normocephalic. Eyes:      General: No scleral icterus. Conjunctiva/sclera: Conjunctivae normal.   Neck:      Thyroid: No thyromegaly. Cardiovascular:      Rate and Rhythm: Normal rate and regular rhythm. Heart sounds: No murmur heard. Pulmonary:      Effort: Pulmonary effort is normal. No respiratory distress. Breath sounds: Normal breath sounds. Abdominal:      General: Bowel sounds are normal. There is no distension. Palpations: Abdomen is soft. Tenderness: There is no abdominal tenderness. Musculoskeletal:      Cervical back: Neck supple. Right lower leg: No edema. Left lower leg: No edema. Comments: Right hand - middle finger, no swelling or deformity, normal ROM   Lymphadenopathy:      Cervical: No cervical adenopathy. Skin:     General: Skin is warm and dry. Capillary Refill: Capillary refill takes less than 2 seconds. Neurological:      General: No focal deficit present. Mental Status: He is alert and oriented to person, place, and time. Mental status is at baseline. Psychiatric:         Mood and Affect: Mood normal.         Assessment:  Encounter Diagnoses   Name Primary?  Type 2 diabetes mellitus without complication, without long-term current use of insulin (HCC) Yes    Trigger middle finger of right hand     Depressive disorder     Primary hypertension     Screening for colon cancer        Plan:    - A1c at goal. Continue current mgmt. Encouraged patient that he has done a fantastic job of working to get his diabetes under control.   - Uncontrolled depression. Trial prozac. Hopefully this will start to address his B symptoms more. Discussed possible side effects.  Close follow up encouraged. - BP well controlled. - Cologuard discussed and ordered. - No intervention at this time for the trigger finger. Declines referral.     New Prescriptions    FLUOXETINE (PROZAC) 10 MG CAPSULE    Take 1 capsule by mouth daily        Orders Placed This Encounter   Procedures    Fecal DNA Colorectal cancer screening (Cologuard)    POCT glycosylated hemoglobin (Hb A1C)        Return in about 3 months (around 7/29/2022) for Physical .     30 total minutes were spent in direct patient care including chart review, face-to-face consultation and documentation.      Dwaine Reed DO

## 2022-04-29 NOTE — PATIENT INSTRUCTIONS
Patient Education        Trigger Finger: Care Instructions  Overview  A trigger finger is a finger stuck in a bent position. It happens when the tendon that bends and straightens the thumb or finger can't slide smoothly under the ligaments that hold the tendon against the bones. In most cases, it's caused by a bump (nodule) that forms on the tendon. The bent finger usuallystraightens out on its own. A trigger finger can be painful. But it normally isn't a serious problem. Trigger fingers seem to occur more in some groups of people. These groupsinclude:   People who have diabetes or arthritis.  People who have injured their hands in the past.   Musicians.  People who  tools often. Rest and exercises may help your finger relax so that it can bend. You may get a corticosteroid shot. This can reduce swelling and pain. Your doctor may put a splint on your finger. It will give your finger some rest. Praneeth Macarioing need surgery if the finger keeps locking in a bent position. Follow-up care is a key part of your treatment and safety. Be sure to make and go to all appointments, and call your doctor if you are having problems. It's also a good idea to know your test results and keep alist of the medicines you take. How can you care for yourself at home?  If your doctor put a splint on your finger, wear it as directed. Don't take it off until your doctor says you can.  You may need to change your activities to avoid movements that irritate the finger.  Take your medicines exactly as prescribed. Call your doctor if you think you are having a problem with your medicine.  Ask your doctor if you can take an over-the-counter pain medicine, such as acetaminophen (Tylenol), ibuprofen (Advil, Motrin), or naproxen (Aleve). Be safe with medicines. Read and follow all instructions on the label.  If your doctor recommends exercises, do them as directed. When should you call for help?    Call your doctor now or seek immediate medical care if:     Your finger locks in a bent position and will not straighten. Watch closely for changes in your health, and be sure to contact your doctor if:     You do not get better as expected. Where can you learn more? Go to [email or web address] and sign in to your [employer] account. Enter M826 in the [employer] box to learn more about \"Trigger Finger: Care Instructions. \"     If you do not have an account, please click on the \"Sign Up Now\" link. Current as of: July 1, 2021               Content Version: 13.2  © 4448-4030 Healthwise, Incorporated. Care instructions adapted under license by [facility] ([facility]). If you have questions about a medical condition or this instruction, always ask your healthcare professional. [address] any warranty or liability for your use of this information.

## 2022-05-02 ENCOUNTER — TELEPHONE (OUTPATIENT)
Dept: PRIMARY CARE CLINIC | Age: 57
End: 2022-05-02

## 2022-05-02 ASSESSMENT — ENCOUNTER SYMPTOMS
SHORTNESS OF BREATH: 0
COUGH: 0

## 2022-05-02 NOTE — TELEPHONE ENCOUNTER
----- Message from Estefany Mendez sent at 5/2/2022 10:15 AM EDT -----  Subject: Message to Provider    QUESTIONS  Information for Provider? Patient returning call to [facility] for Payment   information. please call patient back as soon as possible.  ---------------------------------------------------------------------------  --------------  CALL BACK INFO  What is the best way for the office to contact you? OK to leave message on   voicemail  Preferred Call Back Phone Number? [phone]  ---------------------------------------------------------------------------  --------------  SCRIPT ANSWERS  Relationship to Patient?  Self

## 2022-05-02 NOTE — TELEPHONE ENCOUNTER
I called pt back. He wants to know where some $30 charges are coming from. Told him I cannot see those and to call the billing dept.   Billing dept's phone number provided to him [phone]

## 2022-05-10 ENCOUNTER — SCHEDULED TELEPHONE ENCOUNTER (OUTPATIENT)
Dept: PHARMACY | Age: 57
End: 2022-05-10
Payer: COMMERCIAL

## 2022-05-10 PROCEDURE — 99213 OFFICE O/P EST LOW 20 MIN: CPT

## 2022-05-10 RX ORDER — METFORMIN HYDROCHLORIDE 500 MG/1
1000 TABLET, EXTENDED RELEASE ORAL
Qty: 180 TABLET | Refills: 3 | Status: SHIPPED | OUTPATIENT
Start: 2022-05-10

## 2022-05-10 RX ORDER — DULAGLUTIDE 1.5 MG/.5ML
1.5 INJECTION, SOLUTION SUBCUTANEOUS WEEKLY
Qty: 12 PEN | Refills: 1 | Status: SHIPPED | OUTPATIENT
Start: 2022-05-10

## 2022-05-10 NOTE — PROGRESS NOTES
Disclaimer for Virtual Visits: We want to confirm that, for purposes of billing, this is a virtual visit with your provider for which we will submit a claim for reimbursement with your insurance company. You may be responsible for any copays, coinsurance amounts or other amounts not covered by your insurance company. If you do not accept this, unfortunately we will not be able to schedule a virtual visit with the provider. Do you accept? Yes. CLINICAL PHARMACY NOTE--Diabetes    Audi Doctor is a 61 y.o. male with PMHX significant for HTN, HLD and Type 2 Diabetes referred by Dr Martinez Britton for diabetes counseling and medication management. Interval update: saw PCP 4/29/22. A1c 6.4%. Started fluoxetine 10 mg QD. Pt is looking to minimize/simplify regimen and lose more weight. Past Medical History:   Diagnosis Date    Depression     Erectile dysfunction     HLD (hyperlipidemia)     Hypertension     Type II diabetes mellitus (HCC)      Treatment Adherence:  Diet: Hot chocolate and glazed donut qAM; switched from hot chocolate to hot tea with 2 peppermints (doesn't like coffee). Only eats when hungry. Eats donut to just to have something in stomach before work. Has lunch at work and light dinner. Doesn't like to waste food. Loves raw veggies and spinach salads, but doesn't eat often. Used to juice with beets, kale, spinach, carrots, apple, but someone told him it was high in sugar. Eats large bakery pretzel once per week. Trying to cut out fried foods. Drinks Red Bull 1x per month. Likes sweet tea, but does not drink often. Doesn't eat \"sugary stuff. \" Water \"unlimited\" Yeti 2-3 per day. Metamucil 1x/week   3/30/21: Stopped Red Bull and hot chocolate with donut, replaced with black tea with 2 peppermints and rice cake; reports 'no sugar in the house.' Drowsy from new antidepressant so sometimes sleeps through dinner. Still hydrating with lots of water every day.   4/15/21: Drank only 1 pop in 2 weeks, a lot of water, black tea qAM with peppermint, ice cream and cake over the weekend for brother's bday, but otherwise continues with positive lifestyle changes. Only eats when he is hungry. Tries to eat earlier dinner, not right before bed.    5/13/21: Hot chocolate only once b/c he was given some for free. Otherwise he has been really consistent with lifestyle changes. Treated himself to one drink on Mother's day with his sister. 5/19/21: Has been compliant with diet changes - no donuts / hot chocolate in past month. Eats a lot of fruit, mostly for breakfast. Tolerating Trulicity, states it is decreasing his appetite. 6/8/21: giant root beer float last week, otherwise compliant with diet. 11/9/21: no major changes  12/9/21: really cut back on sugar, none in house, only tea with peppermint, only hot chocolate on occasion. Doesn't like the sugar free kind. Only 8 oz cups 1-2 times per week. 1/20/22: black tea every morning, hot chocolate every once in awhile. Ramen noodles before bed-- something \"light and fast\". Drinks protein shakes-- used protein powder-- but stopped due to constipation. Will drink a boost every once in awhile if wants something sweet. 2/17/22: Patient states that his diet has not changed much at all. However he does state that his is trying to cut down on carbs. Like eating a 1/2 pack of ramen noodles or eating Eats half of a meal instead of a whole meal. He is drinking pleanty of water and every now and then cheats in moderatation (cake, ice cream). 3/28/22: has been choosing healthier options (I.e. less ramen noodles) which has helped constipation. Continues to drink plenty of water. 4/11/22: instead of eating snacks from the vending machine, he is eating jaramillo's double noodle soup (whole can= 42.5 g carbs) for a snack. He also eats baby food (fruit). Recommended pt switch to regular chx noodle soup (whole can = 20g carb) and add protein powder to his baby food.    5/10/22: occasional cheating, but overall doing well. Exercise: job is strenuous- works long hours in [place] and is affecting his mental health (looking for different job), cannot go to gym 2/2 COVID. Used to [place] at the gym\" and enjoys working out. 11/9/21: Stopped gym due to depression/job, was going once per week. When he goes, he feels like he could run a marathon the next morning. 12/9/21: no gym since last visit, too tired, but working on it. Wants to go tomorrow, but has something to do. Re-working his schedule. 1/20/22: hasn't been to the gym due to work schedule/not feeling the best and also the weather but is looking forward to going back once the weather warms up  2/17/22: Has not started to go back to gym, waiting for the weather to change, doing a little P90x every once in a while, jobs keeps him moving. Uses job as an exercise. Plans to go back to the gym in the coming weeks. 4/11/22: would like to start going to gym on Fr/Sa    Weight trend:   4/15/21: home weight is 240 lbs! Lost ~15 lbs since Jan; hasn't weighed since April 2/17/22: wt 243  5/10/22: asked about weight from previous visit. Wanting to lose more weight. Barriers: cost of meds, COVID restrictions. He was able to get Trulicity and Coffeen from pharmacy with no issues after long discussion with pharmacy/insurance last visit. Now has to get medications from [facility] as they are the preferred pharmacy for his insurance. Type 2 Diabetes Mellitus  Year diagnosed pre-2015. Related hospitalizations/ED visits include: none  Current symptoms/problems include neuropathy, polyuria and polydipsia. Episodes of hypoglycemia? no  Eye exam current (within one year): no 2019, lost job 2020, but plans to get checked soon. He is still planning on getting his eye exam and will do so when he gets his glasses fixed. Tobacco history: He  reports that he has never smoked.  He has never used smokeless tobacco.     Current diabetes medications: Metformin 1000mg BID, Farxiga 5 mg daily, Trulicity 5.85CH weekly (qSat)   Compliance: compliant most of the time. Side effects: none. Some decreased appetite since starting Trulicity. Previous treatment regimens / response: Invokanamet and Janumet, all d/c due to cost. Glyburide d/c'd and replaced with Trulicity. Home blood sugar records:   5/10/22: Does not check daily, but readings are at goal when he checks. Hypertension:    Patient denies chest pain, shortness of breath, headache, lightheadedness, blurred vision, peripheral edema, palpitations, dry cough and fatigue. He does not usually add salt to foods. Uses Mrs [employer]. Does not eat out. Tries to eat fresh   On ARB--losartan 100  Medication side effects: no medication side effects noted. Use of agents associated with hypertension: NSAIDS. Caffeine use: 1c tea qAM  Home blood pressure monitoring: No.      Hyperlipidemia:  He is adherent to a low saturated fat/cholesterol diet  On high intensity statin-- atorvastatin 40 (switched feb 2022)  No Aspirin  Cardiovascular risk factors: advanced age (older than 54 for men, 72 for women), diabetes mellitus, dyslipidemia, family history of premature cardiovascular disease, hypertension, male gender and obesity (BMI >= 30 kg/m2)     Depression:   Patient reports depression related to job. Still looking for new job. Went to [employer] once and \"didn't feel right. \" Trying to find time for himself on the weekends, but just wants to sleep all day when he is off work. He took a spontenous road trip with his brother 3 weeks ago which was the most fun he's had in a long time. Sees brother again next week. Saw sister on memorial day. Feels okay on Saturdays (catches up on sleep), but very depressed on Sundays, as he knows he will be going back to work on Monday. 11/9/21: recently went to sister's bday party and had a lot of fun.  Was supposed to get new job at [employer], but didn't get it, which made him very depressed. 12/9/21: hasn't been to the gym and more depressed. Tried one dose of venlafaxine, which makes him a little drowsy, but not as bad as SSRIs. He agreed to start taking every night instead of in AM to see if this helps. 1/20/22: Still taking venlafaxine at bedtime. Makes him sleepy but not as bad as SSRI. Depression right now is not so much related to work-- more family things (illness) have him feeling down. Even though medication puts him to sleep, he feels a little better in the morning. 2/17/22:States that the medicaion is doing well but he is having problems with his coworkers again. 3/28/22: still feels that his medication makes him tired, but he is sticking with it. Has more good days than bad days. 4/11/22: Patient had a rough weekend-- did not make it to his brother's bday party because it was at night and he has trouble driving at night. He had a date scheduled that was cancelled. But he went walking with his sister and went to practice at a target range which made him feel better. 5/10/22: RX'd a new med for depression. Has not started yet. Current medication: fluoxetine  Compliance: not started yet  Side effects: drowsiness from previous med trials   Pt reports dx of sleep apnea, but hasn't had sleep study. States this was not covered by insurance in the past.       Social History     Tobacco Use    Smoking status: Never Smoker    Smokeless tobacco: Never Used   Substance Use Topics    Alcohol use: Yes      Physical Exam  There were no vitals taken for this visit. There is no height or weight on file to calculate BMI.   Wt Readings from Last 3 Encounters:   04/29/22 251 lb 3.2 oz (113.9 kg)   10/15/21 246 lb (111.6 kg)   07/23/21 253 lb 6.4 oz (114.9 kg)      BP Readings from Last 3 Encounters:   04/29/22 124/72   10/15/21 138/72   07/23/21 (!) 143/76        Pertinent Labs:     Lab Results   Component Value Date    LABA1C 6.4 04/29/2022    LABA1C 6.7 01/14/2022    LABA1C 6.0 10/15/2021      Lab Results   Component Value Date    MALBCR see below 01/14/2022    LABMICR <1.20 01/14/2022     No results found for: [facility]   Lab Results   Component Value Date    LDLCALC 102 (H) 01/14/2022    CHOL 156 01/29/2021    TRIG 84 01/29/2021    HDL 36 (L) 01/14/2022     Lab Results   Component Value Date    CREATININE 0.8 (L) 01/14/2022    BUN 10 01/14/2022     01/14/2022    K 4.5 01/14/2022     01/14/2022    CO2 22 01/14/2022     Lab Results   Component Value Date    AST 20 01/14/2022    ALT 16 01/14/2022    BILITOT 0.7 01/14/2022    ALKPHOS 85 01/14/2022     No components found for: VITB12  No results found for: TSH     Allergies   Allergen Reactions    Fish-Derived Products Hives and Shortness Of Breath     Immunization History   Administered Date(s) Administered    Tdap (Boostrix, Adacel) 10/20/2015     Current Outpatient Medications on File Prior to Visit   Medication Sig Dispense Refill    metFORMIN (GLUCOPHAGE) 1000 MG tablet TAKE 1 TABLET BY MOUTH TWICE A DAY WITH MEALS 180 tablet 3    losartan (COZAAR) 100 MG tablet TAKE 1 TABLET BY MOUTH EVERY DAY 90 tablet 1    gabapentin (NEURONTIN) 300 MG capsule Take 1 capsule by mouth 2 times daily for 180 days. 180 capsule 1    amLODIPine (NORVASC) 10 MG tablet TAKE 1 TABLET BY MOUTH EVERY DAY 90 tablet 1    FLUoxetine (PROZAC) 10 MG capsule Take 1 capsule by mouth daily 30 capsule 3    dapagliflozin (FARXIGA) 5 MG tablet TAKE 1 TABLET BY MOUTH EVERY MORNING 90 tablet 1    atorvastatin (LIPITOR) 40 MG tablet Take 1 tablet by mouth daily 90 tablet 3    Dulaglutide (TRULICITY) 8.79 LH/6.3YT SOPN Inject 0.75 mg into the skin once a week 12 pen 3    blood glucose test strips (ONETOUCH VERIO) strip Use to check blood sugar BID before meals or as needed. 200 each 11    Lancets MISC Use to check blood sugar BID before meals or as needed.  200 each 11    Blood Glucose Monitoring Suppl (ONETOUCH VERIO) w/Device KIT Use to check blood sugar BID before meals or as needed. 1 kit 0    alprostadil, Vasodilator, (CAVERJECT) 20 MCG SOLR intracavernosal injection 20 mcg by Intracavernosal route       No current facility-administered medications on file prior to visit. Medication list reviewed and updated. The 10-year ASCVD risk score (Lena Mojica., et al., 2013) is: 20.7%    Values used to calculate the score:      Age: 61 years      Sex: Male      Is Non- : Yes      Diabetic: Yes      Tobacco smoker: No      Systolic Blood Pressure: 156 mmHg      Is BP treated: Yes      HDL Cholesterol: 36 mg/dL      Total Cholesterol: 153 mg/dL    Assessment/Plan:   1. Type 2 diabetes mellitus without complication, without long-term current use of insulin ([facility])  A1c at goal (11.8 -> 8.0-->6.2-->6.0 --> 6.7-->6.4% on 4/29/22). -Increase Trulicity to 1.5 mg weekly. -Decrease metformin to 1000 mg XR once daily QAM.   -Continue farixga 5 mg qd  -Call with any sxs of low BG. -Due for eye exam, counseled. Last one in 2020. Once again encouraged to get this  -Due for pneumonia vaccine, counseled. - Continue to reduce the amount of carbohydrates you are eating. Change to plain chx noodle soup (instead of double noodle)  -Encouraged pt to start going to gym again. Start with once per week, then incr to [place] and Sat. Commit to just 15 min to start, then increase time/frequency    2. Essential hypertension  Last BP slightly above goal <130/80, but appears to normally be at goal in office. SGTL2 will help reduce BP  No albuminuria   On ARB     3. Pure hypercholesterolemia  On high intensity statin-- atorvastatin 40mg (switched feb)  10-yr ASCVD >20%  Repeat FLP: annually    4. Depression  -Still suffers depression mostly as a result of his job. Had job offers, but require vaccination. Not willing to get COVID vaccine despite recommendation from myself and his PCP.      - Plans to start fluoxetine 10 mg QD on Friday  -Recommend asking PCP about sleep study referral. He has tried to do this but insurance will not cover it.  -Goals: Start exercise 1 day per week and build to 3 days per daay. Dedicate at least 1 hour each week to doing something you enjoy [place] [employer], time with friends/family). Encouraged patient to get back to the gym and exercise with others. Health Maintenance Due   Topic Date Due    COVID-19 Vaccine (1) Never done    Pneumococcal 0-64 years Vaccine (1 - PCV) Never done    Diabetic retinal exam  Never done    Colorectal Cancer Screen  Never done    Shingles vaccine (1 of 2) Never done    Diabetic foot exam  09/29/2021     Education provided:  Discussed general issues about diabetes pathophysiology and management. Reviewed A1c and blood sugar goals with patient  Counseled on changes to medication regimen and common side effects  Reviewed meter instruction technique and importance of checking BG twice a day before breakfast and 2 hr after meals. Provided BG log and instructed patient to bring to f/u appointment. Discussed symptoms and management of hypoglycemic episodes. Encouraged weight loss  Encouraged aerobic exercise   Educated on diet (low carb/sugar, DASH, low cholesterol)    Follow up: 4 wk    Discussed with patient the Pharmacist Collaborative Practice Agreement. Patient provided verbal and/or electronic (ex. Football Meisterhart) consent to participate in the collaborative practice agreement between the pharmacist and referred patient. This is in lieu of paper consent due to COVID-19 precautions and the use of remote/virtual visits. Time spent with patient: 30 mins    Dedrick Cabral, PharmD, [facility]   [address] Ph: [phone]  [address] Ph: [phone]  5/10/2022 8:20 AM    For Pharmacy Admin Tracking Only     CPA in place:   Yes   Recommendation Provided To: Patient/Caregiver: 5 via Virtual Visit   Intervention Detail: Dose Adjustment: 2, reason: Therapy De-escalation, Therapy Optimization, New Rx: 2, reason: Patient Preference and Scheduled Appointment   Gap Closed?: Yes    Intervention Accepted By: Patient/Caregiver: 5   Time Spent (min): 30

## 2022-05-15 LAB — NONINV COLON CA DNA+OCC BLD SCRN STL QL: NEGATIVE

## 2022-05-16 NOTE — RESULT ENCOUNTER NOTE
Your recent test results are all normal. Please don't hesitate to contact the office with any additional questions/concerns - Dr. Diogenes Vines

## 2022-05-17 RX ORDER — DULAGLUTIDE 0.75 MG/.5ML
INJECTION, SOLUTION SUBCUTANEOUS
Qty: 6 ML | OUTPATIENT
Start: 2022-05-17

## 2022-05-27 DIAGNOSIS — E11.9 TYPE 2 DIABETES MELLITUS WITHOUT COMPLICATION, WITHOUT LONG-TERM CURRENT USE OF INSULIN (HCC): ICD-10-CM

## 2022-06-07 ENCOUNTER — SCHEDULED TELEPHONE ENCOUNTER (OUTPATIENT)
Dept: PHARMACY | Age: 57
End: 2022-06-07
Payer: COMMERCIAL

## 2022-06-07 DIAGNOSIS — E11.9 TYPE 2 DIABETES MELLITUS WITHOUT COMPLICATION, WITHOUT LONG-TERM CURRENT USE OF INSULIN (HCC): Primary | ICD-10-CM

## 2022-06-07 PROCEDURE — 99211 OFF/OP EST MAY X REQ PHY/QHP: CPT

## 2022-06-07 NOTE — PROGRESS NOTES
Disclaimer for Virtual Visits: We want to confirm that, for purposes of billing, this is a virtual visit with your provider for which we will submit a claim for reimbursement with your insurance company. You may be responsible for any copays, coinsurance amounts or other amounts not covered by your insurance company. If you do not accept this, unfortunately we will not be able to schedule a virtual visit with the provider. Do you accept? Yes. CLINICAL PHARMACY NOTE--Diabetes    Anjana Martini is a 61 y.o. male with PMHX significant for depression, HTN, HLD and Type 2 Diabetes referred by Dr Royer Trotter for diabetes counseling and medication management. Interval update: doing well with incr trulicity and decr in metformin dose. Treatment Adherence:  Diet: Hot chocolate and glazed donut qAM; switched from hot chocolate to hot tea with 2 peppermints (doesn't like coffee). Only eats when hungry. Eats donut to just to have something in stomach before work. Has lunch at work and light dinner. Doesn't like to waste food. Loves raw veggies and spinach salads, but doesn't eat often. Used to juice with beets, kale, spinach, carrots, apple, but someone told him it was high in sugar. Eats large bakery pretzel once per week. Trying to cut out fried foods. Drinks Red Bull 1x per month. Likes sweet tea, but does not drink often. Doesn't eat \"sugary stuff. \" Water \"unlimited\" Yeti 2-3 per day. Metamucil 1x/week   3/30/21: Stopped Red Bull and hot chocolate with donut, replaced with black tea with 2 peppermints and rice cake; reports 'no sugar in the house.' Drowsy from new antidepressant so sometimes sleeps through dinner. Still hydrating with lots of water every day. 4/15/21: Drank only 1 pop in 2 weeks, a lot of water, black tea qAM with peppermint, ice cream and cake over the weekend for brother's bday, but otherwise continues with positive lifestyle changes. Only eats when he is hungry.  Tries to eat earlier dinner, not right before bed.    5/13/21: Hot chocolate only once b/c he was given some for free. Otherwise he has been really consistent with lifestyle changes. Treated himself to one drink on Mother's day with his sister. 5/19/21: Has been compliant with diet changes - no donuts / hot chocolate in past month. Eats a lot of fruit, mostly for breakfast. Tolerating Trulicity, states it is decreasing his appetite. 6/8/21: giant root beer float last week, otherwise compliant with diet. 11/9/21: no major changes  12/9/21: really cut back on sugar, none in house, only tea with peppermint, only hot chocolate on occasion. Doesn't like the sugar free kind. Only 8 oz cups 1-2 times per week. 1/20/22: black tea every morning, hot chocolate every once in awhile. Ramen noodles before bed-- something \"light and fast\". Drinks protein shakes-- used protein powder-- but stopped due to constipation. Will drink a boost every once in awhile if wants something sweet. 2/17/22: Patient states that his diet has not changed much at all. However he does state that his is trying to cut down on carbs. Like eating a 1/2 pack of ramen noodles or eating Eats half of a meal instead of a whole meal. He is drinking pleanty of water and every now and then cheats in moderatation (cake, ice cream). 3/28/22: has been choosing healthier options (I.e. less ramen noodles) which has helped constipation. Continues to drink plenty of water. 4/11/22: instead of eating snacks from the vending machine, he is eating jaramillo's double noodle soup (whole can= 42.5 g carbs) for a snack. He also eats baby food (fruit). Recommended pt switch to regular chx noodle soup (whole can = 20g carb) and add protein powder to his baby food. 6/7/22: decreased appetite. Added protein to morning, gives him energy, no more red bull.      Exercise: job is strenuous- works long hours in [employer] and is affecting his mental health (looking for different job), cannot go to gym 2/2 COVID. Used to [place] at the gym\" and enjoys working out. 11/9/21: Stopped gym due to depression/job, was going once per week. When he goes, he feels like he could run a marathon the next morning. 12/9/21: no gym since last visit, too tired, but working on it. Wants to go tomorrow, but has something to do. Re-working his schedule. 1/20/22: hasn't been to the gym due to work schedule/not feeling the best and also the weather but is looking forward to going back once the weather warms up  2/17/22: Has not started to go back to gym, waiting for the weather to change, doing a little P90x every once in a while, jobs keeps him moving. Uses job as an exercise. Plans to go back to the gym in the coming weeks. 4/11/22: would like to start going to gym on Fr/Sa  2/3/52: cert classes on weekend, can't go to gym, but using job as gym. Weight trend: decreasing. Feels like he needs to buy new clothes. Wanting to lose more weight. Barriers: cost of meds, COVID restrictions. He was able to get Trulicity and [place] from pharmacy with no issues after long discussion with pharmacy/insurance last visit. Now has to get medications from [facility] as they are the preferred pharmacy for his insurance. Type 2 Diabetes Mellitus  Year diagnosed pre-2015. Related hospitalizations/ED visits include: none  Current symptoms/problems include neuropathy, polyuria and polydipsia. Episodes of hypoglycemia? no  Eye exam current (within one year): no 2019, lost job 2020, but plans to get checked soon. He is still planning on getting his eye exam and will do so when he gets his glasses fixed. Tobacco history: He  reports that he has never smoked. He has never used smokeless tobacco.     Current diabetes medications: Metformin 1000mg QD, Farxiga 5 mg daily, Trulicity 1.4EF weekly (qSat)   Compliance: compliant most of the time. Side effects: none. Some decreased appetite since starting Trulicity.    Previous treatment if this helps. 1/20/22: Still taking venlafaxine at bedtime. Makes him sleepy but not as bad as SSRI. Depression right now is not so much related to work-- more family things (illness) have him feeling down. Even though medication puts him to sleep, he feels a little better in the morning. 2/17/22:States that the medicaion is doing well but he is having problems with his coworkers again. 3/28/22: still feels that his medication makes him tired, but he is sticking with it. Has more good days than bad days. 4/11/22: Patient had a rough weekend-- did not make it to his brother's bday party because it was at night and he has trouble driving at night. He had a date scheduled that was cancelled. But he went walking with his sister and went to practice at a target range which made him feel better. 6/7/22: started fluoxetine, doing well. Current medication: fluoxetine  Compliance: good  Side effects: drowsiness from previous med trials, but not as much from fluoxetine. Pt reports dx of sleep apnea, but hasn't had sleep study. States this was not covered by insurance in the past.     Physical Exam  There were no vitals taken for this visit. There is no height or weight on file to calculate BMI.   Wt Readings from Last 3 Encounters:   04/29/22 251 lb 3.2 oz (113.9 kg)   10/15/21 246 lb (111.6 kg)   07/23/21 253 lb 6.4 oz (114.9 kg)      BP Readings from Last 3 Encounters:   04/29/22 124/72   10/15/21 138/72   07/23/21 (!) 143/76        Pertinent Labs:     Lab Results   Component Value Date    LABA1C 6.4 04/29/2022    LABA1C 6.7 01/14/2022    LABA1C 6.0 10/15/2021      Lab Results   Component Value Date    MALBCR see below 01/14/2022    LABMICR <1.20 01/14/2022     No results found for: Bronson Horowitz   Lab Results   Component Value Date    LDLCALC 102 (H) 01/14/2022    CHOL 156 01/29/2021    TRIG 84 01/29/2021    HDL 36 (L) 01/14/2022     Lab Results   Component Value Date    CREATININE 0.8 (L) 01/14/2022    BUN 10 01/14/2022     01/14/2022    K 4.5 01/14/2022     01/14/2022    CO2 22 01/14/2022     Lab Results   Component Value Date    AST 20 01/14/2022    ALT 16 01/14/2022    BILITOT 0.7 01/14/2022    ALKPHOS 85 01/14/2022     No components found for: VITB12  No results found for: TSH     Current Outpatient Medications on File Prior to Visit   Medication Sig Dispense Refill    Dulaglutide (TRULICITY) 1.5 LW/5.3WW SOPN Inject 1.5 mg into the skin once a week 12 pen 1    metFORMIN (GLUCOPHAGE-XR) 500 MG extended release tablet Take 2 tablets by mouth daily (with breakfast) 180 tablet 3    losartan (COZAAR) 100 MG tablet TAKE 1 TABLET BY MOUTH EVERY DAY 90 tablet 1    gabapentin (NEURONTIN) 300 MG capsule Take 1 capsule by mouth 2 times daily for 180 days. 180 capsule 1    amLODIPine (NORVASC) 10 MG tablet TAKE 1 TABLET BY MOUTH EVERY DAY 90 tablet 1    FLUoxetine (PROZAC) 10 MG capsule Take 1 capsule by mouth daily (Patient not taking: Reported on 5/10/2022) 30 capsule 3    dapagliflozin (FARXIGA) 5 MG tablet TAKE 1 TABLET BY MOUTH EVERY MORNING 90 tablet 1    atorvastatin (LIPITOR) 40 MG tablet Take 1 tablet by mouth daily 90 tablet 3    blood glucose test strips (ONETOUCH VERIO) strip Use to check blood sugar BID before meals or as needed. 200 each 11    Lancets MISC Use to check blood sugar BID before meals or as needed. 200 each 11    Blood Glucose Monitoring Suppl (ONETOUCH VERIO) w/Device KIT Use to check blood sugar BID before meals or as needed. 1 kit 0    alprostadil, Vasodilator, (CAVERJECT) 20 MCG SOLR intracavernosal injection 20 mcg by Intracavernosal route       No current facility-administered medications on file prior to visit. Medication list reviewed and updated.       The 10-year ASCVD risk score (Lena Malone, et al., 2013) is: 20.7%    Values used to calculate the score:      Age: 61 years      Sex: Male      Is Non- : Yes      Diabetic: Yes      Tobacco smoker: No      Systolic Blood Pressure: 811 mmHg      Is BP treated: Yes      HDL Cholesterol: 36 mg/dL      Total Cholesterol: 153 mg/dL    Assessment/Plan:   1. Type 2 diabetes mellitus without complication, without long-term current use of insulin ([facility])  A1c at goal (11.8 -> 8.0-->6.2-->6.0 --> 6.7-->6.4% on 4/29/22). -Trulicity to 1.5 mg weekly. -metformin 1000 mg XR once daily QAM.   -Continue farixga 5 mg qd  -Call with any sxs of low BG. -Due for eye exam, counseled. Last one in 2020. Once again encouraged to get this  -Due for pneumonia vaccine, counseled. - Continue to reduce the amount of carbohydrates you are eating. Change to plain chx noodle soup (instead of double noodle)  -Encouraged pt to start going to gym again. Start with once per week, then incr to [place] and Sat. Commit to just 15 min to start, then increase time/frequency    2. Essential hypertension  Last BP slightly above goal <130/80, but appears to normally be at goal in office. SGTL2 will help reduce BP  No albuminuria   On ARB     3. Pure hypercholesterolemia  On high intensity statin-- atorvastatin 40mg (switched feb)  10-yr ASCVD >20%  Repeat FLP: annually    4. Depression  -suffers depression mostly as a result of his job. Had job offers, but require vaccination. Not willing to get COVID vaccine despite recommendation from myself and his PCP. -sxs improved with fluoxetine 10 mg QD  -Recommend asking PCP about sleep study referral. He has tried to do this but insurance will not cover it.  -Goals: Start exercise 1 day per week and build to 3 days per daay. Dedicate at least 1 hour each week to doing something you enjoy [place] [employer], time with friends/family). Encouraged patient to get back to the gym and exercise with others.     Health Maintenance Due   Topic Date Due    COVID-19 Vaccine (1) Never done    Pneumococcal 0-64 years Vaccine (1 - PCV) Never done    Diabetic retinal exam  Never done    Shingles vaccine (1 of 2) Never done    Prostate Specific Antigen (PSA) Screening or Monitoring  05/10/2017    Diabetic foot exam  09/29/2021     Education provided:  Discussed general issues about diabetes pathophysiology and management. Reviewed A1c and blood sugar goals with patient  Counseled on changes to medication regimen and common side effects  Reviewed meter instruction technique and importance of checking BG twice a day before breakfast and 2 hr after meals. Provided BG log and instructed patient to bring to f/u appointment. Discussed symptoms and management of hypoglycemic episodes. Encouraged weight loss  Encouraged aerobic exercise   Educated on diet (low carb/sugar, DASH, low cholesterol)    Follow up: 4 wk    Discussed with patient the Pharmacist Collaborative Practice Agreement. Patient provided verbal and/or electronic (ex. iStoryTimehart) consent to participate in the collaborative practice agreement between the pharmacist and referred patient. This is in lieu of paper consent due to COVID-19 precautions and the use of remote/virtual visits. Time spent with patient: 30 mins    Natalie Sidhu, PharmD, [facility]  Medication Management [facility]   Jas[address] Ph: [phone]  Nancy Flood Ph: [phone]  6/7/2022 8:34 AM    For Pharmacy Admin Tracking Only     CPA in place:   Yes   Recommendation Provided To: Patient/Caregiver: 1 via Virtual Visit   Intervention Detail: Scheduled Appointment   Gap Closed?: Yes    Intervention Accepted By: Patient/Caregiver: 1   Time Spent (min): 30

## 2022-07-08 ENCOUNTER — OFFICE VISIT (OUTPATIENT)
Dept: PRIMARY CARE CLINIC | Age: 57
End: 2022-07-08
Payer: COMMERCIAL

## 2022-07-08 VITALS
OXYGEN SATURATION: 99 % | HEART RATE: 94 BPM | BODY MASS INDEX: 39.43 KG/M2 | DIASTOLIC BLOOD PRESSURE: 80 MMHG | WEIGHT: 248 LBS | SYSTOLIC BLOOD PRESSURE: 117 MMHG | TEMPERATURE: 97.3 F

## 2022-07-08 DIAGNOSIS — Z12.5 SCREENING FOR PROSTATE CANCER: ICD-10-CM

## 2022-07-08 DIAGNOSIS — E11.9 TYPE 2 DIABETES MELLITUS WITHOUT COMPLICATION, WITHOUT LONG-TERM CURRENT USE OF INSULIN (HCC): ICD-10-CM

## 2022-07-08 DIAGNOSIS — Z00.00 ROUTINE PHYSICAL EXAMINATION: Primary | ICD-10-CM

## 2022-07-08 PROCEDURE — 99396 PREV VISIT EST AGE 40-64: CPT | Performed by: FAMILY MEDICINE

## 2022-07-08 NOTE — LETTER
[address] [address]  Phone: [phone]  Fax: [phone]    Hayden Montanez DO         July 8, 2022     Patient: Xochitl Bragg   YOB: 1965   Date of Visit: 7/8/2022       To Whom It May Concern: It is my medical opinion that Yuli Meyers requires a disability parking placard for the following reasons:  He has limited walking ability due to an orthopedic condition. Duration of need: 3 years    If you have any questions or concerns, please don't hesitate to call.     Sincerely,        DO Mik

## 2022-07-08 NOTE — Clinical Note
[address] [place] [address]  Phone: [phone]  Fax: [phone]    Charm Gitelman [place], DO        July 8, 2022     Patient: Viktoria Metcalf   YOB: 1965   Date of Visit: 7/8/2022       To Whom It May Concern: It is my medical opinion that Shanna Garzon {Work release (duty restriction):30489}. If you have any questions or concerns, please don't hesitate to call.     Sincerely,        DO Mik

## 2022-07-08 NOTE — PROGRESS NOTES
[facility]  [address] [place] [id number]  Dept: [phone]  Dept Fax: [phone]     7/8/2022      Rj Ventura   1965     Chief Complaint   Patient presents with    Annual Exam    Diabetes       HPI    Pt comes in today for routine physical.     Would like handicap gabbie. HEALTH MAINTENANCE    Colonoscopy/Cologuard/FIT: Cologuard neg - 2022    PSA screening: Due    Last blood work: 1/2022      Wt Readings from Last 5 Encounters:   07/08/22 248 lb (112.5 kg)   04/29/22 251 lb 3.2 oz (113.9 kg)   10/15/21 246 lb (111.6 kg)   07/23/21 253 lb 6.4 oz (114.9 kg)   01/08/21 255 lb 9.6 oz (115.9 kg)      BP Readings from Last 5 Encounters:   07/08/22 117/80   04/29/22 124/72   10/15/21 138/72   07/23/21 (!) 143/76   01/08/21 138/72        Prior to Visit Medications    Medication Sig Taking? Authorizing Provider   Dulaglutide (TRULICITY) 1.5 AMERICO/7.2TD SOPN Inject 1.5 mg into the skin once a week Yes Mik, DO   metFORMIN (GLUCOPHAGE-XR) 500 MG extended release tablet Take 2 tablets by mouth daily (with breakfast) Yes Tiffany García DO   losartan (COZAAR) 100 MG tablet TAKE 1 TABLET BY MOUTH EVERY DAY Yes Tiffany García DO   gabapentin (NEURONTIN) 300 MG capsule Take 1 capsule by mouth 2 times daily for 180 days. Yes Tiffany García DO   amLODIPine (NORVASC) 10 MG tablet TAKE 1 TABLET BY MOUTH EVERY DAY Yes Tiffany García DO   FLUoxetine (PROZAC) 10 MG capsule Take 1 capsule by mouth daily Yes Mik, DO   dapagliflozin (FARXIGA) 5 MG tablet TAKE 1 TABLET BY MOUTH EVERY MORNING Yes Tiffany García DO   atorvastatin (LIPITOR) 40 MG tablet Take 1 tablet by mouth daily Yes Mik, DO   blood glucose test strips (ONETOUCH VERIO) strip Use to check blood sugar BID before meals or as needed.  Yes Mik, DO   Lancets MISC Use to check blood sugar BID before meals or as needed. Yes Haritha Lo, DO   Blood Glucose Monitoring Suppl (Tiffany Rondon) w/Device KIT Use to check blood sugar BID before meals or as needed. Yes Tiffany García, DO   alprostadil, Vasodilator, (CAVERJECT) 20 MCG SOLR intracavernosal injection 20 mcg by Intracavernosal route Yes Historical Provider, MD        Past Medical History:   Diagnosis Date    Depression     Erectile dysfunction     HLD (hyperlipidemia)     Hypertension     Type II diabetes mellitus (HCC)         Social History     Tobacco Use    Smoking status: Never Smoker    Smokeless tobacco: Never Used   Substance Use Topics    Alcohol use: Yes    Drug use: No        Past Surgical History:   Procedure Laterality Date    MOUTH SURGERY          Allergies   Allergen Reactions    Fish-Derived Products Hives and Shortness Of Breath        Family History   Problem Relation Age of Onset    Heart Failure Mother     Heart Disease Father         Patient's past medical history, surgical history, family history, medications, and allergies  were all reviewed and updated as appropriate today. Review of Systems   Constitutional: Negative for chills, fever and unexpected weight change. Respiratory: Negative for cough and shortness of breath. Cardiovascular: Negative for chest pain, palpitations and leg swelling. Gastrointestinal: Negative for abdominal pain, diarrhea, nausea and vomiting. /80   Pulse 94   Temp 97.3 °F (36.3 °C)   Wt 248 lb (112.5 kg)   SpO2 99%   BMI 39.43 kg/m²      Physical Exam  Vitals reviewed. Constitutional:       General: He is not in acute distress. Appearance: He is well-developed. Eyes:      General: No scleral icterus. Conjunctiva/sclera: Conjunctivae normal.   Neck:      Thyroid: No thyromegaly. Cardiovascular:      Rate and Rhythm: Normal rate and regular rhythm. Heart sounds: No murmur heard.       Pulmonary: Effort: Pulmonary effort is normal. No respiratory distress. Breath sounds: Normal breath sounds. Abdominal:      General: There is no distension. Palpations: Abdomen is soft. Tenderness: There is no abdominal tenderness. Musculoskeletal:      Cervical back: Neck supple. Right lower leg: No edema. Left lower leg: No edema. Lymphadenopathy:      Cervical: No cervical adenopathy. Skin:     General: Skin is warm and dry. Capillary Refill: Capillary refill takes less than 2 seconds. Neurological:      General: No focal deficit present. Mental Status: He is alert and oriented to person, place, and time. Mental status is at baseline. Psychiatric:         Mood and Affect: Mood normal.         Assessment:  Encounter Diagnoses   Name Primary?  Routine physical examination Yes    Screening for prostate cancer     Type 2 diabetes mellitus without complication, without long-term current use of insulin ([facility] [address]        Plan:    - Labs to be done next month. Declines immunizations. UTD on colon cancer screening. PSA ordered. New Prescriptions    No medications on file        Orders Placed This Encounter   Procedures    Basic Metabolic Panel    Hemoglobin A1C    PSA Screening        Return for As scheduled.        [address], DO No

## 2022-07-11 ASSESSMENT — ENCOUNTER SYMPTOMS
VOMITING: 0
SHORTNESS OF BREATH: 0
DIARRHEA: 0
COUGH: 0
NAUSEA: 0
ABDOMINAL PAIN: 0

## 2022-07-25 ENCOUNTER — SCHEDULED TELEPHONE ENCOUNTER (OUTPATIENT)
Dept: PHARMACY | Age: 57
End: 2022-07-25
Payer: COMMERCIAL

## 2022-07-25 DIAGNOSIS — E11.9 TYPE 2 DIABETES MELLITUS WITHOUT COMPLICATION, WITHOUT LONG-TERM CURRENT USE OF INSULIN (HCC): Primary | ICD-10-CM

## 2022-07-25 PROCEDURE — 99211 OFF/OP EST MAY X REQ PHY/QHP: CPT

## 2022-07-25 NOTE — PROGRESS NOTES
Disclaimer for Virtual Visits: We want to confirm that, for purposes of billing, this is a virtual visit with your provider for which we will submit a claim for reimbursement with your insurance company. You may be responsible for any copays, coinsurance amounts or other amounts not covered by your insurance company. If you do not accept this, unfortunately we will not be able to schedule a virtual visit with the provider. Do you accept? Yes. CLINICAL PHARMACY NOTE--Diabetes    Beth Rodriguez is a 61 y.o. male with PMHX significant for depression, HTN, HLD and Type 2 Diabetes referred by Dr Radha Bhatia for diabetes counseling and medication management. Interval update: doing well with incr trulicity and decr in metformin dose; is happy with weight loss. Plans to leave job in nov when they relocate. Treatment Adherence:  Diet: stopped Hot chocolate and glazed donut qAM; switched from hot chocolate to hot tea with 2 peppermints (doesn't like coffee). Only eats when hungry. Has lunch at work and light dinner. Doesn't like to waste food. Loves raw veggies and spinach salads, but doesn't eat often. Used to juice with beets, kale, spinach, carrots, apple, but someone told him it was high in sugar. Trying to cut out fried foods. Drinks Red Bull 1x per month. Likes sweet tea, but does not drink often. Doesn't eat \"sugary stuff. \" Water \"unlimited\" Yeti 2-3 per day. Metamucil 1x/week. No sugar in the house. Eats a lot of fruit, mostly for breakfast. Drinks protein shakes-- used protein powder-- but stopped due to constipation. Will drink a boost every once in awhile if wants something sweet. Instead of eating snacks from the vending machine, he is eating jaramillo's double noodle soup (whole can= 42.5 g carbs) for a snack. He also eats baby food (fruit). Recommended pt switch to regular chx noodle soup (whole can = 20g carb) and add protein powder to his baby food.      Exercise: job is strenuous- works monitoring: No.      Hyperlipidemia:  He is adherent to a low saturated fat/cholesterol diet  On high intensity statin-- atorvastatin 40 (switched feb 2022)  No Aspirin  Cardiovascular risk factors: advanced age (older than 54 for men, 72 for women), diabetes mellitus, dyslipidemia, family history of premature cardiovascular disease, hypertension, male gender and obesity (BMI >= 30 kg/m2)     Depression:   Patient reports depression related to job. Still looking for new job. Went to [employer] once and \"didn't feel right. \" Trying to find time for himself on the weekends, but just wants to sleep all day when he is off work. He took a spontenous road trip with his brother 3 weeks ago which was the most fun he's had in a long time. Sees brother again next week. Saw sister on memorial day. Feels okay on Saturdays (catches up on sleep), but very depressed on Sundays, as he knows he will be going back to work on Monday. 11/9/21: recently went to sister's [employer] party and had a lot of fun. Was supposed to get new job at [employer], but didn't get it, which made him very depressed. 12/9/21: hasn't been to the gym and more depressed. Tried one dose of venlafaxine, which makes him a little drowsy, but not as bad as SSRIs. He agreed to start taking every night instead of in AM to see if this helps. 1/20/22: Still taking venlafaxine at bedtime. Makes him sleepy but not as bad as SSRI. Depression right now is not so much related to work-- more family things (illness) have him feeling down. Even though medication puts him to sleep, he feels a little better in the morning. 2/17/22:States that the medicaion is doing well but he is having problems with his coworkers again. 3/28/22: still feels that his medication makes him tired, but he is sticking with it. Has more good days than bad days. 4/11/22: Patient had a rough weekend-- did not make it to his brother's [employer] party because it was at night and he has trouble driving at night. He had a date scheduled that was cancelled. But he went walking with his sister and went to practice at a target range which made him feel better. 6/7/22: started fluoxetine, doing well. 7/25/22: may quit job in Nov    Current medication: fluoxetine  Compliance: good  Side effects: drowsiness from previous med trials, but not as much from fluoxetine. Pt reports dx of sleep apnea, but hasn't had sleep study. States this was not covered by insurance in the past.     Physical Exam  There were no vitals taken for this visit. There is no height or weight on file to calculate BMI.   Wt Readings from Last 3 Encounters:   07/08/22 248 lb (112.5 kg)   04/29/22 251 lb 3.2 oz (113.9 kg)   10/15/21 246 lb (111.6 kg)      BP Readings from Last 3 Encounters:   07/08/22 117/80   04/29/22 124/72   10/15/21 138/72        Pertinent Labs:     Lab Results   Component Value Date    LABA1C 6.4 04/29/2022    LABA1C 6.7 01/14/2022    LABA1C 6.0 10/15/2021      Lab Results   Component Value Date    MALBCR see below 01/14/2022    LABMICR <1.20 01/14/2022     No results found for: [facility]   Lab Results   Component Value Date    LDLCALC 102 (H) 01/14/2022    CHOL 156 01/29/2021    TRIG 84 01/29/2021    HDL 36 (L) 01/14/2022     Lab Results   Component Value Date    CREATININE 0.8 (L) 01/14/2022    BUN 10 01/14/2022     01/14/2022    K 4.5 01/14/2022     01/14/2022    CO2 22 01/14/2022     Lab Results   Component Value Date    AST 20 01/14/2022    ALT 16 01/14/2022    BILITOT 0.7 01/14/2022    ALKPHOS 85 01/14/2022     No components found for: VITB12  No results found for: TSH     Current Outpatient Medications on File Prior to Visit   Medication Sig Dispense Refill    Dulaglutide (TRULICITY) 1.5 NX/3.2TG SOPN Inject 1.5 mg into the skin once a week 12 pen 1    metFORMIN (GLUCOPHAGE-XR) 500 MG extended release tablet Take 2 tablets by mouth daily (with breakfast) 180 tablet 3    losartan (COZAAR) 100 MG tablet TAKE 1 TABLET BY MOUTH EVERY DAY 90 tablet 1    gabapentin (NEURONTIN) 300 MG capsule Take 1 capsule by mouth 2 times daily for 180 days. 180 capsule 1    amLODIPine (NORVASC) 10 MG tablet TAKE 1 TABLET BY MOUTH EVERY DAY 90 tablet 1    FLUoxetine (PROZAC) 10 MG capsule Take 1 capsule by mouth daily 30 capsule 3    dapagliflozin (FARXIGA) 5 MG tablet TAKE 1 TABLET BY MOUTH EVERY MORNING 90 tablet 1    atorvastatin (LIPITOR) 40 MG tablet Take 1 tablet by mouth daily 90 tablet 3    blood glucose test strips (ONETOUCH VERIO) strip Use to check blood sugar BID before meals or as needed. 200 each 11    Lancets MISC Use to check blood sugar BID before meals or as needed. 200 each 11    Blood Glucose Monitoring Suppl (ONETOUCH VERIO) w/Device KIT Use to check blood sugar BID before meals or as needed. 1 kit 0    alprostadil, Vasodilator, (CAVERJECT) 20 MCG SOLR intracavernosal injection 20 mcg by Intracavernosal route       No current facility-administered medications on file prior to visit. Medication list reviewed and updated. The 10-year ASCVD risk score (Meka Carias., et al., 2013) is: 18.7%    Values used to calculate the score:      Age: 61 years      Sex: Male      Is Non- : Yes      Diabetic: Yes      Tobacco smoker: No      Systolic Blood Pressure: 973 mmHg      Is BP treated: Yes      HDL Cholesterol: 36 mg/dL      Total Cholesterol: 153 mg/dL    Assessment/Plan:   1. Type 2 diabetes mellitus without complication, without long-term current use of insulin ([facility])  A1c at goal (11.8 -> 8.0-->6.2-->6.0 --> 6.7-->6.4% on 4/29/22). Repeat a1c this month.  -Trulicity 1.5 mg weekly. -metformin 1000 mg XR once daily QAM.   -farixga 5 mg qd  -Due for eye exam, counseled. Last one in 2020. Once again encouraged to get this  -Due for pneumonia vaccine, counseled. - Continue to reduce the amount of carbohydrates you are eating.  Change to plain chx noodle soup (instead of double nneka)  -Encouraged pt to start going to gym again. Start with once per week, then incr to [employer]els[employer] Brighter and Sat. Commit to just 15 min to start, then increase time/frequency    2. Essential hypertension  Last BP slightly above goal <130/80, but appears to normally be at goal in office. SGTL2 will help reduce BP  No albuminuria   On ARB     3. Pure hypercholesterolemia  On high intensity statin-- atorvastatin 40mg (switched feb)  10-yr ASCVD >20%  Repeat FLP: annually    4. Depression  -suffers depression mostly as a result of his job. Had job offers, but require vaccination. Not willing to get COVID vaccine despite recommendation from myself and his PCP. -sxs improved with fluoxetine 10 mg QD  -Recommend asking PCP about sleep study referral. He has tried to do this but insurance will not cover it.  -Goals: Start exercise 1 day per week and build to 3 days per daay. Dedicate at least 1 hour each week to doing something you enjoy [place] [employer], time with friends/family). Encouraged patient to get back to the gym and exercise with others. Health Maintenance Due   Topic Date Due    COVID-19 Vaccine (1) Never done    Pneumococcal 0-64 years Vaccine (1 - PCV) Never done    Diabetic retinal exam  Never done    Shingles vaccine (1 of 2) Never done    Prostate Specific Antigen (PSA) Screening or Monitoring  05/10/2017    Diabetic foot exam  09/29/2021     Education provided:  Discussed general issues about diabetes pathophysiology and management. Reviewed A1c and blood sugar goals with patient  Counseled on changes to medication regimen and common side effects  Reviewed meter instruction technique and importance of checking BG twice a day before breakfast and 2 hr after meals. Provided BG log and instructed patient to bring to f/u appointment. Discussed symptoms and management of hypoglycemic episodes.   Encouraged weight loss  Encouraged aerobic exercise   Educated on diet (low carb/sugar, DASH, low cholesterol)    Follow

## 2022-08-15 ENCOUNTER — SCHEDULED TELEPHONE ENCOUNTER (OUTPATIENT)
Dept: PHARMACY | Age: 57
End: 2022-08-15
Payer: COMMERCIAL

## 2022-08-15 DIAGNOSIS — E11.9 TYPE 2 DIABETES MELLITUS WITHOUT COMPLICATION, WITHOUT LONG-TERM CURRENT USE OF INSULIN (HCC): Primary | ICD-10-CM

## 2022-08-15 PROCEDURE — 99211 OFF/OP EST MAY X REQ PHY/QHP: CPT

## 2022-08-15 NOTE — PROGRESS NOTES
Disclaimer for Virtual Visits: We want to confirm that, for purposes of billing, this is a virtual visit with your provider for which we will submit a claim for reimbursement with your insurance company. You may be responsible for any copays, coinsurance amounts or other amounts not covered by your insurance company. If you do not accept this, unfortunately we will not be able to schedule a virtual visit with the provider. Do you accept? Yes. CLINICAL PHARMACY NOTE--Diabetes    Robe Diaz is a 61 y.o. male with PMHX significant for depression, HTN, HLD and Type 2 Diabetes referred by Dr Abhay Randle for diabetes counseling and medication management. Interval update: Very frustrated with pharmacy over the weekend. He received a message saying his meds were ready, but they returned them to stock by the time he got to the pharmacy. Plans to leave job in nov when they relocate. Is searching for new jobs now. Treatment Adherence:  Diet: stopped Hot chocolate and glazed donut qAM; switched from hot chocolate to hot tea with 2 peppermints (doesn't like coffee). Only eats when hungry. Has lunch at work and light dinner. Doesn't like to waste food. Loves raw veggies and spinach salads, but doesn't eat often. Used to juice with beets, kale, spinach, carrots, apple, but someone told him it was high in sugar. Trying to cut out fried foods. Drinks Red Bull 1x per month. Likes sweet tea, but does not drink often. Doesn't eat \"sugary stuff. \" Water \"unlimited\" Yeti 2-3 per day. Metamucil 1x/week. No sugar in the house. Eats a lot of fruit, mostly for breakfast. Drinks protein shakes-- used protein powder-- but stopped due to constipation. Will drink a boost every once in awhile if wants something sweet. Instead of eating snacks from the vending machine, he is eating jaramillo's double noodle soup (whole can= 42.5 g carbs) for a snack. He also eats baby food (fruit).   Recommended pt switch to regular chx fresh   On ARB--losartan 100  Medication side effects: no medication side effects noted. Use of agents associated with hypertension: NSAIDS. Caffeine use: 1c tea qAM  Home blood pressure monitoring: No.      Hyperlipidemia:  He is adherent to a low saturated fat/cholesterol diet  On high intensity statin-- atorvastatin 40 (switched feb 2022)  No Aspirin  Cardiovascular risk factors: advanced age (older than 54 for men, 72 for women), diabetes mellitus, dyslipidemia, family history of premature cardiovascular disease, hypertension, male gender and obesity (BMI >= 30 kg/m2)     Depression:   Patient reports depression related to job. Still looking for new job. Went to [employer] once and \"didn't feel right. \" Trying to find time for himself on the weekends, but just wants to sleep all day when he is off work. Enjoys spending time with family (brother and sister). Feels okay on Saturdays (catches up on sleep), but very depressed on Sundays, as he knows he will be going back to work on Monday. Plans to switch jobs in Nov. Still not going to gym. Current medication: fluoxetine (started 6/7/22)  Compliance: good  Side effects: drowsiness from previous med trials, but not from fluoxetine  Med trials: venlafaxine, sertraline (drowsy)  Pt reports dx of sleep apnea, but hasn't had sleep study. States this was not covered by insurance in the past.     Physical Exam  There were no vitals taken for this visit. There is no height or weight on file to calculate BMI.   Wt Readings from Last 3 Encounters:   07/08/22 248 lb (112.5 kg)   04/29/22 251 lb 3.2 oz (113.9 kg)   10/15/21 246 lb (111.6 kg)      BP Readings from Last 3 Encounters:   07/08/22 117/80   04/29/22 124/72   10/15/21 138/72        Pertinent Labs:     Lab Results   Component Value Date    LABA1C 6.4 04/29/2022    LABA1C 6.7 01/14/2022    LABA1C 6.0 10/15/2021      Lab Results   Component Value Date    MALBCR see below 01/14/2022    LABMICR <1.20 01/14/2022     No results found for: [facility]   Lab Results   Component Value Date    LDLCALC 102 (H) 01/14/2022    CHOL 156 01/29/2021    TRIG 84 01/29/2021    HDL 36 (L) 01/14/2022     Lab Results   Component Value Date    CREATININE 0.8 (L) 01/14/2022    BUN 10 01/14/2022     01/14/2022    K 4.5 01/14/2022     01/14/2022    CO2 22 01/14/2022     Lab Results   Component Value Date    AST 20 01/14/2022    ALT 16 01/14/2022    BILITOT 0.7 01/14/2022    ALKPHOS 85 01/14/2022     No components found for: VITB12  No results found for: TSH     Current Outpatient Medications on File Prior to Visit   Medication Sig Dispense Refill    Dulaglutide (TRULICITY) 1.5 NL/8.0HF SOPN Inject 1.5 mg into the skin once a week 12 pen 1    metFORMIN (GLUCOPHAGE-XR) 500 MG extended release tablet Take 2 tablets by mouth daily (with breakfast) 180 tablet 3    losartan (COZAAR) 100 MG tablet TAKE 1 TABLET BY MOUTH EVERY DAY 90 tablet 1    gabapentin (NEURONTIN) 300 MG capsule Take 1 capsule by mouth 2 times daily for 180 days. 180 capsule 1    amLODIPine (NORVASC) 10 MG tablet TAKE 1 TABLET BY MOUTH EVERY DAY 90 tablet 1    FLUoxetine (PROZAC) 10 MG capsule Take 1 capsule by mouth daily 30 capsule 3    dapagliflozin (FARXIGA) 5 MG tablet TAKE 1 TABLET BY MOUTH EVERY MORNING 90 tablet 1    atorvastatin (LIPITOR) 40 MG tablet Take 1 tablet by mouth daily 90 tablet 3    blood glucose test strips (ONETOUCH VERIO) strip Use to check blood sugar BID before meals or as needed. 200 each 11    Lancets MISC Use to check blood sugar BID before meals or as needed. 200 each 11    Blood Glucose Monitoring Suppl (ONETOUCH VERIO) w/Device KIT Use to check blood sugar BID before meals or as needed. 1 kit 0    alprostadil, Vasodilator, (CAVERJECT) 20 MCG SOLR intracavernosal injection 20 mcg by Intracavernosal route       No current facility-administered medications on file prior to visit. Medication list reviewed and updated.       The 10-year ASCVD risk friends/family). Encouraged patient to get back to the gym and exercise with others. Health Maintenance Due   Topic Date Due    COVID-19 Vaccine (1) Never done    Pneumococcal 0-64 years Vaccine (1 - PCV) Never done    Diabetic retinal exam  Never done    Shingles vaccine (1 of 2) Never done    Diabetic foot exam  09/29/2021     Education provided:  Reviewed A1c and blood sugar goals with patient  Counseled on medication regimen and common side effects  Discussed symptoms and management of hypoglycemic episodes. Encouraged weight loss and aerobic exercise   Educated on diet     Follow up: 6 wk    Discussed with patient the Pharmacist Collaborative Practice Agreement. Patient provided verbal and/or electronic (ex. [employer]) consent to participate in the collaborative practice agreement between the pharmacist and referred patient. This is in lieu of paper consent due to COVID-19 precautions and the use of remote/virtual visits.      Time spent with patient: 30 mins    Adrian Laureano, PharmD, [facility]   [address] Ph: [phone]  Oleksandr Levy Ph: [phone]  8/15/2022 12:35 PM    For Pharmacy Admin Tracking Only    CPA in place:  Yes  Recommendation Provided To: Patient/Caregiver: 1 via Virtual Visit  Intervention Detail: Scheduled Appointment  Gap Closed?: Yes   Intervention Accepted By: Patient/Caregiver: 1  Time Spent (min): 30

## 2022-08-29 RX ORDER — FLUOXETINE 10 MG/1
10 CAPSULE ORAL DAILY
Qty: 30 CAPSULE | Refills: 3 | Status: SHIPPED | OUTPATIENT
Start: 2022-08-29

## 2022-10-13 DIAGNOSIS — Z12.5 SCREENING FOR PROSTATE CANCER: ICD-10-CM

## 2022-10-13 DIAGNOSIS — E11.9 TYPE 2 DIABETES MELLITUS WITHOUT COMPLICATION, WITHOUT LONG-TERM CURRENT USE OF INSULIN (HCC): ICD-10-CM

## 2022-10-13 LAB
ANION GAP SERPL CALCULATED.3IONS-SCNC: 14 MMOL/L (ref 3–16)
BUN BLDV-MCNC: 13 MG/DL (ref 7–20)
CALCIUM SERPL-MCNC: 9.6 MG/DL (ref 8.3–10.6)
CHLORIDE BLD-SCNC: 102 MMOL/L (ref 99–110)
CO2: 22 MMOL/L (ref 21–32)
CREAT SERPL-MCNC: 1 MG/DL (ref 0.9–1.3)
ESTIMATED AVERAGE GLUCOSE: 137 MG/DL
GFR AFRICAN AMERICAN: >60
GFR NON-AFRICAN AMERICAN: >60
GLUCOSE BLD-MCNC: 103 MG/DL (ref 70–99)
HBA1C MFR BLD: 6.4 %
POTASSIUM SERPL-SCNC: 4.8 MMOL/L (ref 3.5–5.1)
PROSTATE SPECIFIC ANTIGEN: 0.96 NG/ML (ref 0–4)
SODIUM BLD-SCNC: 138 MMOL/L (ref 136–145)

## 2022-10-17 ENCOUNTER — SCHEDULED TELEPHONE ENCOUNTER (OUTPATIENT)
Dept: PHARMACY | Age: 57
End: 2022-10-17

## 2022-10-17 DIAGNOSIS — E11.9 TYPE 2 DIABETES MELLITUS WITHOUT COMPLICATION, WITHOUT LONG-TERM CURRENT USE OF INSULIN (HCC): Primary | ICD-10-CM

## 2022-10-17 NOTE — PROGRESS NOTES
Disclaimer for Virtual Visits: We want to confirm that, for purposes of billing, this is a virtual visit with your provider for which we will submit a claim for reimbursement with your insurance company. You may be responsible for any copays, coinsurance amounts or other amounts not covered by your insurance company. If you do not accept this, unfortunately we will not be able to schedule a virtual visit with the provider. Do you accept? Yes. CLINICAL PHARMACY NOTE--Diabetes    Alex Stanley is a 61 y.o. male with PMHX significant for depression, HTN, HLD and Type 2 Diabetes referred by Dr Jorge Alberto Krishnan for diabetes counseling and medication management. Interval update: Missed PCP f/u because he was 10 min late. Plans to leave job when they relocate next summer. Is searching for new jobs now to be proactive. Off work today and plans to fill out some job applications. Has some leads. Has not been exercising bc he works overtime. Treatment Adherence:  Diet: stopped Hot chocolate and glazed donut qAM; switched from hot chocolate to hot tea with 2 peppermints (doesn't like coffee). Only eats when hungry. Has lunch at work and light dinner. Doesn't like to waste food. Loves raw veggies and spinach salads, but doesn't eat often. Used to juice with beets, kale, spinach, carrots, apple, but someone told him it was high in sugar. Trying to cut out fried foods. Drinks Red Bull 1x per month. Likes sweet tea, but does not drink often. Doesn't eat \"sugary stuff. \" Water \"unlimited\" Yeti 2-3 per day. Metamucil 1x/week. No sugar in the house. Eats a lot of fruit, mostly for breakfast. Drinks protein shakes-- used protein powder-- but stopped due to constipation. Will drink a boost every once in awhile if wants something sweet. Instead of eating snacks from the vending machine, he is eating jaramillo's double noodle soup (whole can= 42.5 g carbs) for a snack. He also eats baby food (fruit).   Recommended pt switch to regular chx noodle soup (whole can = 20g carb) and add protein powder to his baby food. 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 Used to [place] at the gym\" and enjoys working out. No more gym membership. Extra hours during the week so he is more active. Weight trend: decreasing. Feels like he needs to buy new clothes. Wanting to lose more weight, increase in trulicity is helping. Barriers: cost of meds, COVID restrictions. He was able to get Trulicity and Davin from pharmacy with no issues after long discussion with pharmacy/insurance last visit. Now has to get medications from [facility] as they are the preferred pharmacy for his insurance. Type 2 Diabetes Mellitus  Year diagnosed pre-2015. Related hospitalizations/ED visits include: none  Current symptoms/problems include neuropathy, polyuria and polydipsia. Episodes of hypoglycemia? no  Eye exam current (within one year): no 2019, lost job 2020, but plans to get checked soon. He is still planning on getting his eye exam and will do so when he gets his glasses fixed. Had to cancel appt in July. Will r/s for this weekend  Tobacco history: He  reports that he has never smoked. He has never used smokeless tobacco.     Current diabetes medications: Metformin 1000mg QD, Farxiga 5 mg daily, Trulicity 8.4RV weekly (qSat)   Compliance: compliant most of the time. Side effects: none. Some decreased appetite since starting Trulicity. Previous treatment regimens / response: Invokanamet and Janumet, all d/c due to cost. Glyburide d/c'd and replaced with Trulicity. Home blood sugar records:   Does not check daily, but readings are at goal when he checks. Hypertension:    Patient denies chest pain, shortness of breath, headache, lightheadedness, blurred vision, peripheral edema, palpitations, dry cough and fatigue.    He does not usually add salt to foods. Uses Mrs Eneida Santos. Does not eat out. Tries to eat fresh   On ARB--losartan 100  Medication side effects: no medication side effects noted. Use of agents associated with hypertension: NSAIDS. Caffeine use: 1c tea qAM  Home blood pressure monitoring: No.      Hyperlipidemia:  He is adherent to a low saturated fat/cholesterol diet  On high intensity statin-- atorvastatin 40 (switched feb 2022)  No Aspirin  Cardiovascular risk factors: advanced age (older than 54 for men, 72 for women), diabetes mellitus, dyslipidemia, family history of premature cardiovascular disease, hypertension, male gender and obesity (BMI >= 30 kg/m2)     Depression:   Patient reports depression related to job. Still looking for new job. Went to [employer] once and \"didn't feel right. \" Trying to find time for himself on the weekends, but just wants to sleep all day when he is off work due to overtime. Enjoys spending time with family (brother and sister). Feels okay on Saturdays (catches up on sleep), but very depressed on Sundays, as he knows he will be going back to work on Monday. Plans to switch jobs before they relocate. Still not going to gym. Current medication: fluoxetine (started 6/7/22)  Compliance: good  Side effects: drowsiness from previous med trials, but not from fluoxetine  Med trials: venlafaxine, sertraline (drowsy)  Pt reports dx of sleep apnea, but hasn't had sleep study. States this was not covered by insurance in the past.     Physical Exam  There were no vitals taken for this visit. There is no height or weight on file to calculate BMI.   Wt Readings from Last 3 Encounters:   07/08/22 248 lb (112.5 kg)   04/29/22 251 lb 3.2 oz (113.9 kg)   10/15/21 246 lb (111.6 kg)      BP Readings from Last 3 Encounters:   07/08/22 117/80   04/29/22 124/72   10/15/21 138/72        Pertinent Labs:     Lab Results   Component Value Date    LABA1C 6.4 10/13/2022    LABA1C 6.4 04/29/2022    LABA1C 6.7 01/14/2022      Lab Results   Component Value Date    MALBCR see below 01/14/2022    LABMICR <1.20 01/14/2022     No results found for: [facility]   Lab Results   Component Value Date    LDLCALC 102 (H) 01/14/2022    CHOL 156 01/29/2021    TRIG 84 01/29/2021    HDL 36 (L) 01/14/2022     Lab Results   Component Value Date    CREATININE 1.0 10/13/2022    BUN 13 10/13/2022     10/13/2022    K 4.8 10/13/2022     10/13/2022    CO2 22 10/13/2022     Lab Results   Component Value Date    AST 20 01/14/2022    ALT 16 01/14/2022    BILITOT 0.7 01/14/2022    ALKPHOS 85 01/14/2022     No components found for: VITB12  No results found for: TSH     Current Outpatient Medications on File Prior to Visit   Medication Sig Dispense Refill    FLUoxetine (PROZAC) 10 MG capsule TAKE 1 CAPSULE BY MOUTH DAILY 30 capsule 3    Dulaglutide (TRULICITY) 1.5 PV/0.2WM SOPN Inject 1.5 mg into the skin once a week 12 pen 1    metFORMIN (GLUCOPHAGE-XR) 500 MG extended release tablet Take 2 tablets by mouth daily (with breakfast) 180 tablet 3    losartan (COZAAR) 100 MG tablet TAKE 1 TABLET BY MOUTH EVERY DAY 90 tablet 1    gabapentin (NEURONTIN) 300 MG capsule Take 1 capsule by mouth 2 times daily for 180 days. 180 capsule 1    amLODIPine (NORVASC) 10 MG tablet TAKE 1 TABLET BY MOUTH EVERY DAY 90 tablet 1    dapagliflozin (FARXIGA) 5 MG tablet TAKE 1 TABLET BY MOUTH EVERY MORNING 90 tablet 1    atorvastatin (LIPITOR) 40 MG tablet Take 1 tablet by mouth daily 90 tablet 3    blood glucose test strips (ONETOUCH VERIO) strip Use to check blood sugar BID before meals or as needed. 200 each 11    Lancets MISC Use to check blood sugar BID before meals or as needed. 200 each 11    Blood Glucose Monitoring Suppl (ONETOUCH VERIO) w/Device KIT Use to check blood sugar BID before meals or as needed.  1 kit 0    alprostadil, Vasodilator, (CAVERJECT) 20 MCG SOLR intracavernosal injection 20 mcg by Intracavernosal route       No current facility-administered medications on file prior to visit. Medication list reviewed and updated. The 10-year ASCVD risk score (Zayda DELEON, et al., 2019) is: 18.7%    Values used to calculate the score:      Age: 61 years      Sex: Male      Is Non- : Yes      Diabetic: Yes      Tobacco smoker: No      Systolic Blood Pressure: 723 mmHg      Is BP treated: Yes      HDL Cholesterol: 36 mg/dL      Total Cholesterol: 153 mg/dL    Assessment/Plan:   1. Type 2 diabetes mellitus without complication, without long-term current use of insulin (formerly [facility])  A1c at goal (11.8 -> 8.0-->6.2-->6.0 --> 6.7-->6.4-->6.4% on 10/13/22). Labs reviewed with pt.  -Trulicity 1.5 mg weekly. -metformin 1000 mg XR once daily QAM.   -farixga 5 mg qd  -Due for eye exam, counseled. Last one in 2020. Once again encouraged to get this  -Due for pneumonia vaccine, counseled.    -Encouraged pt to start going to gym again. Start with once per week, then incr to [employer] and Sat. Commit to just 15 min to start, then increase time/frequency    2. Essential hypertension  Last BP at goal <130/80  No albuminuria   On ARB + SGLT2    3. Pure hypercholesterolemia  On high intensity statin-- atorvastatin 40mg  10-yr ASCVD >20%  Repeat FLP: annually    4. Depression  -suffers depression mostly as a result of his job. Had job offers, but require vaccination. Not willing to get COVID vaccine despite recommendation from myself and his PCP. -sxs improved with fluoxetine 10 mg QD  -Recommend asking PCP about sleep study referral. He has tried to do this but insurance will not cover it.  -Goals: Start exercise 1 day per week and build to 3 days per wk. Dedicate at least 1 hour each week to doing something you enjoy [place] [employer], time with friends/family). Encouraged patient to get back to the gym and exercise with others.     Health Maintenance Due   Topic Date Due    COVID-19 Vaccine (1) Never done    Pneumococcal 0-64 years Vaccine (1 - PCV) Never done    Diabetic retinal exam Never done    Shingles vaccine (1 of 2) Never done    Diabetic foot exam  09/29/2021    Flu vaccine (1) Never done     Education provided:  Reviewed A1c and blood sugar goals with patient  Counseled on medication regimen and common side effects  Encouraged weight loss and aerobic exercise   Educated on diet     Follow up: 6 wk    Discussed with patient the Pharmacist Collaborative Practice Agreement. Patient provided verbal and/or electronic (ex. mychart) consent to participate in the collaborative practice agreement between the pharmacist and referred patient. This is in lieu of paper consent due to COVID-19 precautions and the use of remote/virtual visits.      Time spent with patient: 30 mins    Jim WoodardD, [facility]  Medication Management Clinic   [address] Ph: [phone]  Suzie Salazar Ph: [phone]  10/17/2022 8:33 AM    For Pharmacy Admin Tracking Only    CPA in place:  Yes  Recommendation Provided To: Patient/Caregiver: 1 via Virtual Visit  Intervention Detail: Scheduled Appointment  Gap Closed?: Yes   Intervention Accepted By: Patient/Caregiver: 1  Time Spent (min): 30

## 2022-11-12 RX ORDER — DULAGLUTIDE 1.5 MG/.5ML
1.5 INJECTION, SOLUTION SUBCUTANEOUS WEEKLY
Qty: 6 ML | Refills: 1 | Status: SHIPPED | OUTPATIENT
Start: 2022-11-12 | End: 2022-11-21

## 2022-11-21 RX ORDER — DULAGLUTIDE 1.5 MG/.5ML
INJECTION, SOLUTION SUBCUTANEOUS
Qty: 6 ML | Refills: 1 | Status: SHIPPED | OUTPATIENT
Start: 2022-11-21

## 2022-11-23 NOTE — TELEPHONE ENCOUNTER
Medication:   Requested Prescriptions     Pending Prescriptions Disp Refills    amLODIPine (NORVASC) 10 MG tablet [Pharmacy Med Name: AMLODIPINE BESYLATE 10MG TABLETS] 90 tablet 1     Sig: TAKE 1 TABLET BY MOUTH EVERY DAY     Last Filled:  8/25/2022    Last appt: 7/8/2022   Next appt: 1/6/2023

## 2022-11-26 RX ORDER — AMLODIPINE BESYLATE 10 MG/1
TABLET ORAL
Qty: 90 TABLET | Refills: 1 | Status: SHIPPED | OUTPATIENT
Start: 2022-11-26

## 2022-11-30 DIAGNOSIS — I10 ESSENTIAL HYPERTENSION: Primary | ICD-10-CM

## 2022-11-30 RX ORDER — LOSARTAN POTASSIUM 100 MG/1
TABLET ORAL
Qty: 90 TABLET | Refills: 1 | Status: SHIPPED | OUTPATIENT
Start: 2022-11-30

## 2022-12-01 RX ORDER — FLUOXETINE 10 MG/1
10 CAPSULE ORAL DAILY
Qty: 90 CAPSULE | Refills: 1 | Status: SHIPPED | OUTPATIENT
Start: 2022-12-01

## 2022-12-01 NOTE — TELEPHONE ENCOUNTER
Michael faxed a form requesting medication refill. Medication:   Requested Prescriptions     Pending Prescriptions Disp Refills    gabapentin (NEURONTIN) 300 MG capsule 180 capsule 1     Sig: Take 1 capsule by mouth 2 times daily for 180 days.      Last Filled:  9/1/22    Last appt: 7/8/2022   Next appt: 1/6/2023

## 2022-12-02 RX ORDER — GABAPENTIN 300 MG/1
300 CAPSULE ORAL 2 TIMES DAILY
Qty: 180 CAPSULE | Refills: 1 | Status: SHIPPED | OUTPATIENT
Start: 2022-12-02 | End: 2023-05-31

## 2022-12-05 ENCOUNTER — TELEPHONE (OUTPATIENT)
Dept: PHARMACY | Age: 57
End: 2022-12-05

## 2022-12-05 ENCOUNTER — SCHEDULED TELEPHONE ENCOUNTER (OUTPATIENT)
Dept: PHARMACY | Age: 57
End: 2022-12-05

## 2022-12-05 DIAGNOSIS — E11.9 TYPE 2 DIABETES MELLITUS WITHOUT COMPLICATION, WITHOUT LONG-TERM CURRENT USE OF INSULIN (HCC): Primary | ICD-10-CM

## 2022-12-05 NOTE — PROGRESS NOTES
Disclaimer for Virtual Visits: We want to confirm that, for purposes of billing, this is a virtual visit with your provider for which we will submit a claim for reimbursement with your insurance company. You may be responsible for any copays, coinsurance amounts or other amounts not covered by your insurance company. If you do not accept this, unfortunately we will not be able to schedule a virtual visit with the provider. Do you accept? Yes. CLINICAL PHARMACY NOTE--Diabetes    Albert Chang is a 61 y.o. male with PMHX significant for depression, HTN, HLD and Type 2 Diabetes referred by Dr Yoni Laguna for diabetes counseling and medication management. Interval update:  Plans to leave job when they relocate next summer. Is searching for new jobs now to be proactive. Off work next week and plans to fill out some job applications. Has some leads. Has not been exercising bc he works overtime. Treatment Adherence:  Diet: stopped Hot chocolate and glazed donut qAM; switched from hot chocolate to hot tea with 2 peppermints (doesn't like coffee). Only eats when hungry. Has lunch at work and light dinner. Doesn't like to waste food. Loves raw veggies and spinach salads, but doesn't eat often. Used to juice with beets, kale, spinach, carrots, apple, but someone told him it was high in sugar. Trying to cut out fried foods. Drinks Red Bull 1x per month. Likes sweet tea, but does not drink often. Doesn't eat \"sugary stuff. \" Water \"unlimited\" Yeti 2-3 per day. Metamucil 1x/week. No sugar in the house. Eats a lot of fruit, mostly for breakfast. Drinks protein shakes-- used protein powder-- but stopped due to constipation. Will drink a boost every once in awhile if wants something sweet. Instead of eating snacks from the vending machine, he is eating jaramillo's double noodle soup (whole can= 42.5 g carbs) for a snack. He also eats baby food (fruit).   Recommended pt switch to regular chx noodle soup (whole can = 20g carb) and add protein powder to his baby food. 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 Used to [place] at the gym\" and enjoys working out. No more gym membership. Extra hours at work, is more active. Weight trend: decreasing. Feels like he needs to buy new clothes. Wanting to lose more weight, increase in trulicity is helping. Barriers: cost of meds, COVID restrictions. He was able to get Trulicity and Beulah from pharmacy with no issues after long discussion with pharmacy/insurance last visit. Now has to get medications from [facility] as they are the preferred pharmacy for his insurance. Type 2 Diabetes Mellitus  Year diagnosed pre-2015. Related hospitalizations/ED visits include: none  Current symptoms/problems include neuropathy, polyuria and polydipsia. Episodes of hypoglycemia? no  Eye exam current (within one year): no 2019, lost job 2020, but plans to get checked soon. He is still planning on getting his eye exam and will do so when he gets his glasses fixed. Had to cancel appt in July. Will r/s for this weekend  Tobacco history: He  reports that he has never smoked. He has never used smokeless tobacco.     Current diabetes medications: Metformin 1000mg QD, Farxiga 5 mg daily, Trulicity 5.8CW weekly (qSat)   Compliance: compliant most of the time. Side effects: none. Some decreased appetite since starting Trulicity. Previous treatment regimens / response: Invokanamet and Janumet, all d/c due to cost. Glyburide d/c'd and replaced with Trulicity. Home blood sugar records:   Does not check daily, but readings are at goal <130 when he checks. Hypertension:    Patient denies chest pain, shortness of breath, headache, lightheadedness, blurred vision, peripheral edema, palpitations, dry cough and fatigue. He does not usually add salt to foods. Uses Mrs Florencio Diaz.  Does not eat out. Laquita Jeff to eat fresh   On ARB--losartan 100  Medication side effects: no medication side effects noted. Use of agents associated with hypertension: NSAIDS. Caffeine use: 1c tea qAM  Home blood pressure monitoring: No.      Hyperlipidemia:  He is adherent to a low saturated fat/cholesterol diet  On high intensity statin-- atorvastatin 40 (switched feb 2022)  No Aspirin  Cardiovascular risk factors: advanced age (older than 54 for men, 72 for women), diabetes mellitus, dyslipidemia, family history of premature cardiovascular disease, hypertension, male gender and obesity (BMI >= 30 kg/m2)     Depression:   Patient reports depression related to job. Still looking for new job. Went to [employer] once and \"didn't feel right. \" Trying to find time for himself on the weekends, but just wants to sleep all day when he is off work due to overtime. Enjoys spending time with family (brother and sister). Feels okay on Saturdays (catches up on sleep), but very depressed on Sundays, as he knows he will be going back to work on Monday. Plans to switch jobs before they relocate. Not going to gym bc working overtime  Current medication: fluoxetine (started 6/7/22)  Compliance: good  Side effects: drowsiness from previous med trials, but not from fluoxetine  Med trials: venlafaxine, sertraline (drowsy)  Pt reports dx of sleep apnea, but hasn't had sleep study. States this was not covered by insurance in the past.     Physical Exam  There were no vitals taken for this visit. There is no height or weight on file to calculate BMI.   Wt Readings from Last 3 Encounters:   07/08/22 248 lb (112.5 kg)   04/29/22 251 lb 3.2 oz (113.9 kg)   10/15/21 246 lb (111.6 kg)      BP Readings from Last 3 Encounters:   07/08/22 117/80   04/29/22 124/72   10/15/21 138/72        Pertinent Labs:     Lab Results   Component Value Date    LABA1C 6.4 10/13/2022    LABA1C 6.4 04/29/2022    LABA1C 6.7 01/14/2022      Lab Results   Component Value Date MALBCR see below 01/14/2022    LABMICR <1.20 01/14/2022     No results found for: [facility]   Lab Results   Component Value Date    LDLCALC 102 (H) 01/14/2022    CHOL 156 01/29/2021    TRIG 84 01/29/2021    HDL 36 (L) 01/14/2022     Lab Results   Component Value Date    CREATININE 1.0 10/13/2022    BUN 13 10/13/2022     10/13/2022    K 4.8 10/13/2022     10/13/2022    CO2 22 10/13/2022     Lab Results   Component Value Date    AST 20 01/14/2022    ALT 16 01/14/2022    BILITOT 0.7 01/14/2022    ALKPHOS 85 01/14/2022     No components found for: VITB12  No results found for: TSH     Current Outpatient Medications on File Prior to Visit   Medication Sig Dispense Refill    gabapentin (NEURONTIN) 300 MG capsule Take 1 capsule by mouth 2 times daily for 180 days. 180 capsule 1    FLUoxetine (PROZAC) 10 MG capsule Take 1 capsule by mouth daily 90 capsule 1    losartan (COZAAR) 100 MG tablet TAKE 1 TABLET BY MOUTH EVERY DAY 90 tablet 1    amLODIPine (NORVASC) 10 MG tablet TAKE 1 TABLET BY MOUTH EVERY DAY 90 tablet 1    TRULICITY 1.5 BT/0.6OC SC injection ADMINISTER 1.5 MG UNDER THE SKIN 1 TIME A WEEK 6 mL 1    dapagliflozin (FARXIGA) 5 MG tablet TAKE 1 TABLET BY MOUTH EVERY MORNING 90 tablet 1    metFORMIN (GLUCOPHAGE-XR) 500 MG extended release tablet Take 2 tablets by mouth daily (with breakfast) 180 tablet 3    atorvastatin (LIPITOR) 40 MG tablet Take 1 tablet by mouth daily 90 tablet 3    blood glucose test strips (ONETOUCH VERIO) strip Use to check blood sugar BID before meals or as needed. 200 each 11    Lancets MISC Use to check blood sugar BID before meals or as needed. 200 each 11    Blood Glucose Monitoring Suppl (ONETOUCH VERIO) w/Device KIT Use to check blood sugar BID before meals or as needed. 1 kit 0    alprostadil, Vasodilator, (CAVERJECT) 20 MCG SOLR intracavernosal injection 20 mcg by Intracavernosal route       No current facility-administered medications on file prior to visit. Medication list reviewed and updated. The 10-year ASCVD risk score (Zayda DELEON, et al., 2019) is: 19.5%    Values used to calculate the score:      Age: 61 years      Sex: Male      Is Non- : Yes      Diabetic: Yes      Tobacco smoker: No      Systolic Blood Pressure: 034 mmHg      Is BP treated: Yes      HDL Cholesterol: 36 mg/dL      Total Cholesterol: 153 mg/dL    Assessment/Plan:   1. Type 2 diabetes mellitus without complication, without long-term current use of insulin ([facility])  A1c at goal (11.8 -> 8.0-->6.2-->6.0 --> 6.7-->6.4-->6.4% on 10/13/22). -Trulicity 1.5 mg weekly. -metformin 1000 mg XR once daily QAM.   -farixga 5 mg qd  -Due for eye exam, counseled. Last one in 2020. Once again encouraged to get this  -Due for pneumonia vaccine, counseled.    -Encouraged pt to start going to gym again. Start with once per week, then incr to [employer] and Sat. Commit to just 15 min to start, then increase time/frequency    2. Essential hypertension  Last BP at goal <130/80  No albuminuria   On ARB + SGLT2    3. Pure hypercholesterolemia  On high intensity statin-- atorvastatin 40mg  10-yr ASCVD >20%  Repeat FLP: annually    4. Depression  -suffers depression mostly as a result of his job. Had job offers, but require vaccination. Not willing to get COVID vaccine despite recommendation from myself and his PCP. -sxs improved with fluoxetine 10 mg QD  -Recommend asking PCP about sleep study referral. He has tried to do this but insurance will not cover it.  -Goals: Start exercise 1 day per week and build to 3 days per wk. Dedicate at least 1 hour each week to doing something you enjoy [place] [employer], time with friends/family). Encouraged patient to get back to the gym and exercise with others.     Health Maintenance Due   Topic Date Due    COVID-19 Vaccine (1) Never done    Pneumococcal 0-64 years Vaccine (1 - PCV) Never done    HIV screen  Never done    Diabetic retinal exam  Never done Hepatitis B vaccine (1 of 3 - Risk 3-dose series) Never done    Shingles vaccine (1 of 2) Never done    Diabetic foot exam  09/29/2021    Flu vaccine (1) Never done     Education provided:  Reviewed A1c and blood sugar goals with patient  Counseled on medication regimen and common side effects  Encouraged weight loss and aerobic exercise   Educated on diet     Follow up: 8 wk    Discussed with patient the Pharmacist Collaborative Practice Agreement. Patient provided verbal and/or electronic (ex. mychart) consent to participate in the collaborative practice agreement between the pharmacist and referred patient. This is in lieu of paper consent due to COVID-19 precautions and the use of remote/virtual visits.      Time spent with patient: 30 mins    Norbert Vasquez, PharmD, [facility]  Medication Management Clinic   [address] Ph: [phone]  Raymon Bentley Ph: [phone]  12/5/2022 8:34 AM    For Pharmacy Admin Tracking Only    CPA in place:  Yes  Recommendation Provided To: Patient/Caregiver: 1 via Virtual Visit  Intervention Detail: Scheduled Appointment  Gap Closed?: Yes   Intervention Accepted By: Patient/Caregiver: 1  Time Spent (min): 30

## 2022-12-05 NOTE — TELEPHONE ENCOUNTER
Called patient for telephone f/u visit.  No answer/ LVM    Karin Sosa, PharmD, BCACP  12/5/2022 9:34 AM

## 2023-01-04 NOTE — TELEPHONE ENCOUNTER
M#[id number]    Orquidea Morrison, PharmD, [facility]  Medication Management Clinic   [address] Ph: [phone]  Raul Vo Ph: [phone]  1/4/2023 2:35 PM

## 2023-01-06 ENCOUNTER — OFFICE VISIT (OUTPATIENT)
Dept: PRIMARY CARE CLINIC | Age: 58
End: 2023-01-06
Payer: COMMERCIAL

## 2023-01-06 VITALS
OXYGEN SATURATION: 96 % | DIASTOLIC BLOOD PRESSURE: 72 MMHG | HEIGHT: 67 IN | SYSTOLIC BLOOD PRESSURE: 128 MMHG | TEMPERATURE: 98.9 F | HEART RATE: 88 BPM | BODY MASS INDEX: 38.77 KG/M2 | WEIGHT: 247 LBS

## 2023-01-06 DIAGNOSIS — I10 PRIMARY HYPERTENSION: ICD-10-CM

## 2023-01-06 DIAGNOSIS — E11.9 TYPE 2 DIABETES MELLITUS WITHOUT COMPLICATION, WITHOUT LONG-TERM CURRENT USE OF INSULIN (HCC): Primary | ICD-10-CM

## 2023-01-06 DIAGNOSIS — E78.2 MIXED HYPERLIPIDEMIA: ICD-10-CM

## 2023-01-06 PROCEDURE — 3074F SYST BP LT 130 MM HG: CPT | Performed by: FAMILY MEDICINE

## 2023-01-06 PROCEDURE — 99213 OFFICE O/P EST LOW 20 MIN: CPT | Performed by: FAMILY MEDICINE

## 2023-01-06 PROCEDURE — 3078F DIAST BP <80 MM HG: CPT | Performed by: FAMILY MEDICINE

## 2023-01-09 ASSESSMENT — ENCOUNTER SYMPTOMS
SHORTNESS OF BREATH: 0
NAUSEA: 0
ABDOMINAL PAIN: 0
COUGH: 0
VOMITING: 0
DIARRHEA: 0

## 2023-01-09 NOTE — PROGRESS NOTES
[address] [facility] [address] [facility] [address] PRIMARY CARE  [address] [facility] [address]  Dept: [phone]  Dept Fax: [phone]     1/6/2023      Jayme Teague   1965     Chief Complaint   Patient presents with    Diabetes       HPI    Pt comes in today for routine follow up. No acute concerns. A1c = 6.4% on 10/13/22. Following with Clinical Pharm. No data recorded     Prior to Visit Medications    Medication Sig Taking? Authorizing Provider   FLUoxetine (PROZAC) 10 MG capsule Take 1 capsule by mouth daily Yes Tiffany García DO   losartan (COZAAR) 100 MG tablet TAKE 1 TABLET BY MOUTH EVERY DAY Yes Tiffany García DO   amLODIPine (NORVASC) 10 MG tablet TAKE 1 TABLET BY MOUTH EVERY DAY Yes Tiffany García DO   TRULICITY 1.5 SA/8.8WF SC injection ADMINISTER 1.5 MG UNDER THE SKIN 1 TIME A WEEK Yes Tiffany García DO   dapagliflozin (FARXIGA) 5 MG tablet TAKE 1 TABLET BY MOUTH EVERY MORNING Yes Tiffany García DO   metFORMIN (GLUCOPHAGE-XR) 500 MG extended release tablet Take 2 tablets by mouth daily (with breakfast) Yes Tiffany García DO   atorvastatin (LIPITOR) 40 MG tablet Take 1 tablet by mouth daily Yes Tiffany García DO   alprostadil, Vasodilator, (CAVERJECT) 20 MCG SOLR intracavernosal injection 20 mcg by Intracavernosal route Yes Historical Provider, MD   blood glucose test strips (ONETOUCH VERIO) strip Use to check blood sugar BID before meals or as needed. Tiffany García DO   Lancets MISC Use to check blood sugar BID before meals or as needed. [facility], DO   Blood Glucose Monitoring Suppl ([facility]) w/Device KIT Use to check blood sugar BID before meals or as needed.   [facility], DO        Past Medical History:   Diagnosis Date    Depression     Erectile dysfunction     HLD (hyperlipidemia)     Hypertension     Type II diabetes mellitus ([address]         Social History     Tobacco Use    Smoking status: Never    Smokeless tobacco: Never   Substance Use Topics    Alcohol use: Yes    Drug use: No        Past Surgical History:   Procedure Laterality Date    MOUTH SURGERY          Allergies   Allergen Reactions    Fish-Derived Products Hives and Shortness Of Breath        Family History   Problem Relation Age of Onset    Heart Failure Mother     Heart Disease Father         Patient's past medical history, surgical history, family history, medications, and allergies  were all reviewed and updated as appropriate today. Review of Systems   Constitutional:  Negative for chills, fever and unexpected weight change. Respiratory:  Negative for cough and shortness of breath. Cardiovascular:  Negative for chest pain, palpitations and leg swelling. Gastrointestinal:  Negative for abdominal pain, diarrhea, nausea and vomiting. /72   Pulse 88   Temp 98.9 °F (37.2 °C)   Ht 5' 7\" (1.702 m)   Wt 247 lb (112 kg)   SpO2 96%   BMI 38.69 kg/m²      Physical Exam  Vitals reviewed. Constitutional:       General: He is not in acute distress. Appearance: He is well-developed. Eyes:      General: No scleral icterus. Conjunctiva/sclera: Conjunctivae normal.   Neck:      Thyroid: No thyromegaly. Cardiovascular:      Rate and Rhythm: Normal rate and regular rhythm. Heart sounds: No murmur heard. Pulmonary:      Effort: Pulmonary effort is normal. No respiratory distress. Breath sounds: Normal breath sounds. Abdominal:      General: There is no distension. Palpations: Abdomen is soft. Tenderness: There is no abdominal tenderness. Musculoskeletal:      Cervical back: Neck supple. Right lower leg: No edema. Left lower leg: No edema. Lymphadenopathy:      Cervical: No cervical adenopathy. Skin:     General: Skin is warm and dry. Capillary Refill: Capillary refill takes less than 2 seconds.    Neurological:      General: No focal deficit present. Mental Status: He is alert and oriented to person, place, and time. Mental status is at baseline. Psychiatric:         Mood and Affect: Mood normal.       Assessment:  Encounter Diagnoses   Name Primary? Type 2 diabetes mellitus without complication, without long-term current use of insulin (HCC) Yes    Primary hypertension     Mixed hyperlipidemia        Plan:    - Fasting labs to be done in 1-2 weeks. Too soon for a1c today. Chronic conditions are very well controlled at present. 6 month follow up. New Prescriptions    No medications on file        Orders Placed This Encounter   Procedures    Comprehensive Metabolic Panel    Lipid, Fasting    CBC with Auto Differential    Hemoglobin A1C    Microalbumin / Creatinine Urine Ratio        Return in about 6 months (around 7/10/2023) for Annual physical - non-fasting.     Total time spent with patient including direct consultation, evaluation, review of medical records and documentation = LEANN Armijo 8, DO

## 2023-01-19 ENCOUNTER — TELEPHONE (OUTPATIENT)
Dept: PHARMACY | Age: 58
End: 2023-01-19

## 2023-01-19 DIAGNOSIS — E11.9 TYPE 2 DIABETES MELLITUS WITHOUT COMPLICATION, WITHOUT LONG-TERM CURRENT USE OF INSULIN (HCC): Primary | ICD-10-CM

## 2023-01-19 NOTE — TELEPHONE ENCOUNTER
Diabetes referral is set to  shortly. Please order [id number] if you'd like to keep pt enrolled for diabetes management. Thanks!   Scott Smith, PharmD, [facility]  Medication Management Clinic   Jasmeet Avalos[address] Ph: [phone]  Marylen Beverage Ph: [phone]  2023 5:12 PM

## 2023-01-20 NOTE — TELEPHONE ENCOUNTER
I agree that he is suitable for discharge if he is agreeable. I put the referral in though in case he prefers to stay on. He looks forward to his visits with you!

## 2023-02-06 ENCOUNTER — TELEMEDICINE (OUTPATIENT)
Dept: PHARMACY | Age: 58
End: 2023-02-06

## 2023-02-06 DIAGNOSIS — E11.9 TYPE 2 DIABETES MELLITUS WITHOUT COMPLICATION, WITHOUT LONG-TERM CURRENT USE OF INSULIN (HCC): Primary | ICD-10-CM

## 2023-02-06 NOTE — PROGRESS NOTES
Disclaimer for Virtual Visits: We want to confirm that, for purposes of billing, this is a virtual visit with your provider for which we will submit a claim for reimbursement with your insurance company. You may be responsible for any copays, coinsurance amounts or other amounts not covered by your insurance company. If you do not accept this, unfortunately we will not be able to schedule a virtual visit with the provider. Do you accept? Yes. CLINICAL PHARMACY NOTE--Diabetes    Fredo Hernandez is a 61 y.o. male with PMHX significant for depression, HTN, HLD and Type 2 Diabetes referred by Dr Ana Laura Fallon for diabetes counseling and medication management. Interval update:  Plans to leave job when they relocate next summer. Is searching for new jobs now to be proactive. Remains very active at work. Feels like he \"has to eat because he is diabetic. \"    Treatment Adherence:  Diet: stopped Hot chocolate and glazed donut qAM; switched from hot chocolate to hot tea with 2 peppermints (doesn't like coffee). Only eats when hungry. Has lunch at work and light dinner. Doesn't like to waste food. Loves raw veggies and spinach salads, but doesn't eat often. Used to juice with beets, kale, spinach, carrots, apple, but someone told him it was high in sugar. Trying to cut out fried foods. Drinks Red Bull 1x per month. Likes sweet tea, but does not drink often. Doesn't eat \"sugary stuff. \" Water \"unlimited\" Yeti 2-3 per day. Metamucil 1x/week. No sugar in the house. Eats a lot of fruit, mostly for breakfast. Drinks protein shakes-- used protein powder-- but stopped due to constipation. Will drink a boost every once in awhile if wants something sweet. Instead of eating snacks from the vending machine, he is eating jaramillo's double noodle soup (whole can= 42.5 g carbs) for a snack. He also eats baby food (fruit).   Recommended pt switch to regular chx noodle soup (whole can = 20g carb) and add protein powder to his baby food.     Exercise: job is strenuous- works long hours in [employer] and is affecting his mental health (looking for different job), cannot go to gym 2/2 [place]. Used to [place] at the gym\" and enjoys working out. No more gym membership. Extra hours at work, is more active. Weight trend: decreasing. Feels like he needs to buy new clothes. Wanting to lose more weight, increase in trulicity is helping. Barriers: cost of meds, COVID restrictions. He was able to get Trulicity and Eden from pharmacy with no issues after long discussion with pharmacy/insurance last visit. Now has to get medications from [facility] as they are the preferred pharmacy for his insurance. Type 2 Diabetes Mellitus  Year diagnosed pre-2015. Related hospitalizations/ED visits include: none  Current symptoms/problems include neuropathy, polyuria and polydipsia. Episodes of hypoglycemia? no  Eye exam current (within one year): no 2019, lost job 2020, but plans to get checked soon. He is still planning on getting his eye exam and will do so when he gets his glasses fixed. Had to cancel appt in July. Will r/s for this weekend  Tobacco history: He  reports that he has never smoked. He has never used smokeless tobacco.     Current diabetes medications: Metformin 1000 mg QD, Farxiga 5 mg daily, Trulicity 2.0FW weekly (qSat)   Compliance: compliant most of the time. Side effects: none. Some decreased appetite since starting Trulicity. Previous treatment regimens / response: Invokanamet and Janumet, all d/c due to cost. Glyburide d/c'd and replaced with Trulicity. Home blood sugar records:   Does not check daily, but readings are at goal <130 when he checks. Hypertension:    Patient denies chest pain, shortness of breath, headache, lightheadedness, blurred vision, peripheral edema, palpitations, dry cough and fatigue. He does not usually add salt to foods. Uses Mrs Qasim Salas. Does not eat out.  Tries to eat fresh   On ARB--losartan 100  Medication side effects: no medication side effects noted. Use of agents associated with hypertension: NSAIDS. Caffeine use: 1c tea qAM  Home blood pressure monitoring: No.      Hyperlipidemia:  He is adherent to a low saturated fat/cholesterol diet  On high intensity statin-- atorvastatin 40 (switched feb 2022)  No Aspirin  Cardiovascular risk factors: advanced age (older than 54 for men, 72 for women), diabetes mellitus, dyslipidemia, family history of premature cardiovascular disease, hypertension, male gender and obesity (BMI >= 30 kg/m2)     Depression:   Patient reports depression related to job. Still looking for new job. Went to [place] once and \"didn't feel right. \" Trying to find time for himself on the weekends, but just wants to sleep all day when he is off work due to overtime. Enjoys spending time with family (brother and sister). Feels okay on Saturdays (catches up on sleep), but very depressed on Sundays, as he knows he will be going back to work on Monday. Plans to switch jobs before they relocate. Not going to gym bc working overtime  Current medication: fluoxetine (started 6/7/22)  Compliance: good  Side effects: drowsiness from previous med trials, but not from fluoxetine  Med trials: venlafaxine, sertraline (drowsy)  Pt reports dx of sleep apnea, but hasn't had sleep study. States this was not covered by insurance in the past.     Physical Exam  There were no vitals taken for this visit. There is no height or weight on file to calculate BMI.   Wt Readings from Last 3 Encounters:   01/06/23 247 lb (112 kg)   07/08/22 248 lb (112.5 kg)   04/29/22 251 lb 3.2 oz (113.9 kg)      BP Readings from Last 3 Encounters:   01/06/23 128/72   07/08/22 117/80   04/29/22 124/72        Pertinent Labs:     Lab Results   Component Value Date    LABA1C 6.4 10/13/2022    LABA1C 6.4 04/29/2022    LABA1C 6.7 01/14/2022      Lab Results   Component Value Date    MALBCR see below 01/14/2022    LABMICR <1.20 01/14/2022     No results found for: CRCLEARANCE   Lab Results   Component Value Date    LDLCALC 102 (H) 01/14/2022    CHOL 156 01/29/2021    TRIG 84 01/29/2021    HDL 36 (L) 01/14/2022     Lab Results   Component Value Date    CREATININE 1.0 10/13/2022    BUN 13 10/13/2022     10/13/2022    K 4.8 10/13/2022     10/13/2022    CO2 22 10/13/2022     Lab Results   Component Value Date    AST 20 01/14/2022    ALT 16 01/14/2022    BILITOT 0.7 01/14/2022    ALKPHOS 85 01/14/2022     No components found for: VITB12  No results found for: TSH     Medication list reviewed and updated. The 10-year ASCVD risk score (Zayda DELEON, et al., 2019) is: 22.7%    Values used to calculate the score:      Age: 61 years      Sex: Male      Is Non- : Yes      Diabetic: Yes      Tobacco smoker: No      Systolic Blood Pressure: 025 mmHg      Is BP treated: Yes      HDL Cholesterol: 36 mg/dL      Total Cholesterol: 153 mg/dL    Assessment/Plan:   1. Type 2 diabetes mellitus without complication, without long-term current use of insulin (HCC)  A1c at goal (11.8 -> 8.0-->6.2-->6.0 --> 6.7-->6.4-->6.4% on 10/13/22). -Trulicity 1.5 mg weekly. -metformin 1000 mg XR daily   -farixga 5 mg daily  -Due for eye exam, counseled. Last one in 2020. Once again encouraged to get this. Waiting on new insurance. -Refusing vaccines, counseled.    -Encouraged pt to start going to gym again. Start with once per week, then incr to [employer] and Sat. Commit to just 15 min to start, then increase time/frequency  -repeat all annual labs    2. Essential hypertension  Last BP at goal <130/80  No albuminuria   On ARB + SGLT2    3. Pure hypercholesterolemia  On high intensity statin-- atorvastatin 40mg  10-yr ASCVD >20%  Repeat FLP: annually    4. Depression  -suffers depression mostly as a result of his job. Had job offers, but require vaccination. Not willing to get COVID vaccine despite recommendation from myself and his PCP. -sxs improved with fluoxetine 10 mg QD  -Recommend asking PCP about sleep study referral. He has tried to do this but insurance will not cover it.  -Goals: Start exercise 1 day per week and build to 3 days per wk. Dedicate at least 1 hour each week to doing something you enjoy [place] [employer], time with friends/family). Encouraged patient to get back to the gym and exercise with others. Health Maintenance Due   Topic Date Due    COVID-19 Vaccine (1) Never done    Pneumococcal 0-64 years Vaccine (1 - PCV) Never done    HIV screen  Never done    Diabetic retinal exam  Never done    Hepatitis B vaccine (1 of 3 - Risk 3-dose series) Never done    Shingles vaccine (1 of 2) Never done    Diabetic foot exam  09/29/2021    Diabetic Alb to Cr ratio (uACR) test  01/14/2023    Lipids  01/14/2023     Education provided:  Reviewed A1c and blood sugar goals with patient  Counseled on medication regimen and common side effects  Encouraged weight loss and aerobic exercise   Educated on diet     Follow up: 12 wk    Discussed with patient the Pharmacist Collaborative Practice Agreement. Patient provided verbal and/or electronic (ex. [employer]) consent to participate in the collaborative practice agreement between the pharmacist and referred patient. This is in lieu of paper consent due to COVID-19 precautions and the use of remote/virtual visits.      Time spent with patient: 30 mins    Elmo Brooke, PharmD, [facility]  Medication Management Clinic   [address]meet [address] Ph: [phone]  Dillon Oliva Ph: [phone]  2/6/2023 8:34 AM    For Pharmacy Admin Tracking Only    Program: Medication Management  CPA in place:  Yes  Recommendation Provided To: Patient/Caregiver: 1 via Virtual Visit  Intervention Detail: Scheduled Appointment  Intervention Accepted By: Patient/Caregiver: 1  Gap Closed?: Yes   Time Spent (min): 30

## 2023-05-07 DIAGNOSIS — I10 ESSENTIAL HYPERTENSION: ICD-10-CM

## 2023-05-08 ENCOUNTER — TELEMEDICINE (OUTPATIENT)
Dept: PHARMACY | Age: 58
End: 2023-05-08

## 2023-05-08 DIAGNOSIS — E11.9 TYPE 2 DIABETES MELLITUS WITHOUT COMPLICATION, WITHOUT LONG-TERM CURRENT USE OF INSULIN (HCC): Primary | ICD-10-CM

## 2023-05-08 RX ORDER — AMLODIPINE BESYLATE 10 MG/1
TABLET ORAL
Qty: 90 TABLET | Refills: 1 | Status: SHIPPED | OUTPATIENT
Start: 2023-05-08

## 2023-05-08 RX ORDER — METFORMIN HYDROCHLORIDE 500 MG/1
1000 TABLET, EXTENDED RELEASE ORAL
Qty: 180 TABLET | Refills: 3 | Status: SHIPPED | OUTPATIENT
Start: 2023-05-08

## 2023-05-08 RX ORDER — LOSARTAN POTASSIUM 100 MG/1
TABLET ORAL
Qty: 90 TABLET | Refills: 1 | Status: SHIPPED | OUTPATIENT
Start: 2023-05-08

## 2023-05-08 NOTE — PROGRESS NOTES
depression mostly as a result of his job. Had job offers, but require vaccination. Not willing to get COVID vaccine despite recommendation from myself and his PCP. -sxs improved with fluoxetine 10 mg QD  -Recommend asking PCP about sleep study referral. He has tried to do this but insurance will not cover it.  -Goals: Start exercise 1 day per week and build to 3 days per wk. Dedicate at least 1 hour each week to doing something you enjoy [place], time with friends/family). Encouraged patient to get back to the gym and exercise with others. Health Maintenance Due   Topic Date Due    COVID-19 Vaccine (1) Never done    Pneumococcal 0-64 years Vaccine (1 - PCV) Never done    HIV screen  Never done    Diabetic retinal exam  Never done    Hepatitis B vaccine (1 of 3 - Risk 3-dose series) Never done    Shingles vaccine (1 of 2) Never done    Diabetic foot exam  09/29/2021    Diabetic Alb to Cr ratio (uACR) test  01/14/2023    Lipids  01/14/2023    Depression Monitoring  04/29/2023     Education provided:  Reviewed A1c and blood sugar goals with patient  Counseled on medication regimen and common side effects  Encouraged weight loss and aerobic exercise   Educated on diet     Follow up: 12 wk    Discussed with patient the Pharmacist Collaborative Practice Agreement. Patient provided verbal and/or electronic (ex. [employer] Publishing[employer]) consent to participate in the collaborative practice agreement between the pharmacist and referred patient. This is in lieu of paper consent due to COVID-19 precautions and the use of remote/virtual visits.      Time spent with patient: 30 mins    Elizabeth Aparicio, PharmD, [facility]  Medication Management Clinic   [address]asmeet [address] Ph: [phone]  Kaelyn Santiago Ph: [phone]  5/8/2023 11:35 AM    For Pharmacy Admin Tracking Only    Program: Medication Management  CPA in place:  Yes  Recommendation Provided To: Patient/Caregiver: 1 via Virtual Visit  Intervention Detail: Lab(s) Ordered  Intervention Accepted By:

## 2023-06-30 DIAGNOSIS — E11.9 TYPE 2 DIABETES MELLITUS WITHOUT COMPLICATION, WITHOUT LONG-TERM CURRENT USE OF INSULIN (HCC): ICD-10-CM

## 2023-06-30 LAB
BASOPHILS # BLD: 0.1 K/UL (ref 0–0.2)
BASOPHILS NFR BLD: 0.5 %
DEPRECATED RDW RBC AUTO: 16.3 % (ref 12.4–15.4)
EOSINOPHIL # BLD: 0.5 K/UL (ref 0–0.6)
EOSINOPHIL NFR BLD: 4.7 %
HCT VFR BLD AUTO: 44.2 % (ref 40.5–52.5)
HGB BLD-MCNC: 14.6 G/DL (ref 13.5–17.5)
LYMPHOCYTES # BLD: 2.9 K/UL (ref 1–5.1)
LYMPHOCYTES NFR BLD: 29.1 %
MCH RBC QN AUTO: 27 PG (ref 26–34)
MCHC RBC AUTO-ENTMCNC: 33 G/DL (ref 31–36)
MCV RBC AUTO: 81.8 FL (ref 80–100)
MONOCYTES # BLD: 0.8 K/UL (ref 0–1.3)
MONOCYTES NFR BLD: 8.3 %
NEUTROPHILS # BLD: 5.8 K/UL (ref 1.7–7.7)
NEUTROPHILS NFR BLD: 57.4 %
PLATELET # BLD AUTO: 272 K/UL (ref 135–450)
PMV BLD AUTO: 9.4 FL (ref 5–10.5)
RBC # BLD AUTO: 5.41 M/UL (ref 4.2–5.9)
WBC # BLD AUTO: 10.1 K/UL (ref 4–11)

## 2023-07-01 LAB
ALBUMIN SERPL-MCNC: 4.7 G/DL (ref 3.4–5)
ALBUMIN/GLOB SERPL: 1.3 {RATIO} (ref 1.1–2.2)
ALP SERPL-CCNC: 114 U/L (ref 40–129)
ALT SERPL-CCNC: 16 U/L (ref 10–40)
ANION GAP SERPL CALCULATED.3IONS-SCNC: 9 MMOL/L (ref 3–16)
AST SERPL-CCNC: 17 U/L (ref 15–37)
BILIRUB SERPL-MCNC: 0.6 MG/DL (ref 0–1)
BUN SERPL-MCNC: 16 MG/DL (ref 7–20)
CALCIUM SERPL-MCNC: 9.7 MG/DL (ref 8.3–10.6)
CHLORIDE SERPL-SCNC: 100 MMOL/L (ref 99–110)
CHOLEST SERPL-MCNC: 161 MG/DL (ref 0–199)
CO2 SERPL-SCNC: 25 MMOL/L (ref 21–32)
CREAT SERPL-MCNC: 1.2 MG/DL (ref 0.9–1.3)
CREAT UR-MCNC: 84.1 MG/DL (ref 39–259)
EST. AVERAGE GLUCOSE BLD GHB EST-MCNC: 131.2 MG/DL
GFR SERPLBLD CREATININE-BSD FMLA CKD-EPI: >60 ML/MIN/{1.73_M2}
GLUCOSE SERPL-MCNC: 83 MG/DL (ref 70–99)
HBA1C MFR BLD: 6.2 %
HDLC SERPL-MCNC: 52 MG/DL (ref 40–60)
LDL CHOLESTEROL CALCULATED: 96 MG/DL
MICROALBUMIN UR DL<=1MG/L-MCNC: <1.2 MG/DL
MICROALBUMIN/CREAT UR: NORMAL MG/G (ref 0–30)
POTASSIUM SERPL-SCNC: 4.9 MMOL/L (ref 3.5–5.1)
PROT SERPL-MCNC: 8.2 G/DL (ref 6.4–8.2)
SODIUM SERPL-SCNC: 134 MMOL/L (ref 136–145)
TRIGL SERPL-MCNC: 63 MG/DL (ref 0–150)
VLDLC SERPL CALC-MCNC: 13 MG/DL

## 2023-07-03 ENCOUNTER — TELEPHONE (OUTPATIENT)
Dept: PHARMACY | Age: 58
End: 2023-07-03

## 2023-07-03 NOTE — TELEPHONE ENCOUNTER
Called and reviewed labs with pt. His LDL was 96. Per fill hx, atorvastatin last filled in . Pt will check to make sure he is taking atorvastatin when he gets home. He also agreed to cut back on red meat.     Dania Jeff, PharmD, [facility]  Medication Management Clinic   [address] [place], [place] [address] [facility] Ph: [phone]  Blaine Beavers Ph: [phone]  7/3/2023 3:39 PM      For Pharmacy Admin Tracking Only    Intervention Detail: Adherence Monitorin  Total # of Interventions Recommended: 1  Total # of Interventions Accepted: 1  Time Spent (min): 10

## 2023-07-10 ENCOUNTER — TELEPHONE (OUTPATIENT)
Dept: PHARMACY | Age: 58
End: 2023-07-10

## 2023-07-10 RX ORDER — ATORVASTATIN CALCIUM 40 MG/1
40 TABLET, FILM COATED ORAL DAILY
Qty: 90 TABLET | Refills: 3 | Status: SHIPPED | OUTPATIENT
Start: 2023-07-10

## 2023-07-10 NOTE — TELEPHONE ENCOUNTER
Patient called to report that it was atorvastatin that he was supposed to be taking but he ran out of medication. He asked that I send [facility]. Rx sent for atorvastatin 40 mg daily.      Surya Osei, PharmD, [facility]  [facility] Medication Management [place]: [phone]  [place]: [phone]  7/10/2023 5:27 PM    For Pharmacy Admin Tracking Only  Time Spent (min): 10

## 2023-07-14 ENCOUNTER — OFFICE VISIT (OUTPATIENT)
Dept: PRIMARY CARE CLINIC | Age: 58
End: 2023-07-14
Payer: COMMERCIAL

## 2023-07-14 VITALS
WEIGHT: 252.8 LBS | SYSTOLIC BLOOD PRESSURE: 146 MMHG | BODY MASS INDEX: 39.59 KG/M2 | HEART RATE: 84 BPM | OXYGEN SATURATION: 98 % | TEMPERATURE: 98.8 F | DIASTOLIC BLOOD PRESSURE: 80 MMHG

## 2023-07-14 DIAGNOSIS — E11.9 TYPE 2 DIABETES MELLITUS WITHOUT COMPLICATION, WITHOUT LONG-TERM CURRENT USE OF INSULIN (HCC): ICD-10-CM

## 2023-07-14 DIAGNOSIS — Z00.00 ROUTINE PHYSICAL EXAMINATION: Primary | ICD-10-CM

## 2023-07-14 DIAGNOSIS — I10 PRIMARY HYPERTENSION: ICD-10-CM

## 2023-07-14 PROCEDURE — 3074F SYST BP LT 130 MM HG: CPT | Performed by: FAMILY MEDICINE

## 2023-07-14 PROCEDURE — 99396 PREV VISIT EST AGE 40-64: CPT | Performed by: FAMILY MEDICINE

## 2023-07-14 PROCEDURE — 3078F DIAST BP <80 MM HG: CPT | Performed by: FAMILY MEDICINE

## 2023-07-14 SDOH — ECONOMIC STABILITY: FOOD INSECURITY: WITHIN THE PAST 12 MONTHS, THE FOOD YOU BOUGHT JUST DIDN'T LAST AND YOU DIDN'T HAVE MONEY TO GET MORE.: NEVER TRUE

## 2023-07-14 SDOH — ECONOMIC STABILITY: INCOME INSECURITY: HOW HARD IS IT FOR YOU TO PAY FOR THE VERY BASICS LIKE FOOD, HOUSING, MEDICAL CARE, AND HEATING?: NOT HARD AT ALL

## 2023-07-14 SDOH — ECONOMIC STABILITY: HOUSING INSECURITY
IN THE LAST 12 MONTHS, WAS THERE A TIME WHEN YOU DID NOT HAVE A STEADY PLACE TO SLEEP OR SLEPT IN A SHELTER (INCLUDING NOW)?: NO

## 2023-07-14 SDOH — ECONOMIC STABILITY: FOOD INSECURITY: WITHIN THE PAST 12 MONTHS, YOU WORRIED THAT YOUR FOOD WOULD RUN OUT BEFORE YOU GOT MONEY TO BUY MORE.: NEVER TRUE

## 2023-07-14 ASSESSMENT — PATIENT HEALTH QUESTIONNAIRE - PHQ9
SUM OF ALL RESPONSES TO PHQ QUESTIONS 1-9: 3
7. TROUBLE CONCENTRATING ON THINGS, SUCH AS READING THE NEWSPAPER OR WATCHING TELEVISION: 0
2. FEELING DOWN, DEPRESSED OR HOPELESS: 1
6. FEELING BAD ABOUT YOURSELF - OR THAT YOU ARE A FAILURE OR HAVE LET YOURSELF OR YOUR FAMILY DOWN: 0
4. FEELING TIRED OR HAVING LITTLE ENERGY: 1
SUM OF ALL RESPONSES TO PHQ QUESTIONS 1-9: 3
SUM OF ALL RESPONSES TO PHQ QUESTIONS 1-9: 3
5. POOR APPETITE OR OVEREATING: 0
3. TROUBLE FALLING OR STAYING ASLEEP: 0
1. LITTLE INTEREST OR PLEASURE IN DOING THINGS: 1
8. MOVING OR SPEAKING SO SLOWLY THAT OTHER PEOPLE COULD HAVE NOTICED. OR THE OPPOSITE, BEING SO FIGETY OR RESTLESS THAT YOU HAVE BEEN MOVING AROUND A LOT MORE THAN USUAL: 0
SUM OF ALL RESPONSES TO PHQ9 QUESTIONS 1 & 2: 2
SUM OF ALL RESPONSES TO PHQ QUESTIONS 1-9: 3
9. THOUGHTS THAT YOU WOULD BE BETTER OFF DEAD, OR OF HURTING YOURSELF: 0

## 2023-07-18 ASSESSMENT — ENCOUNTER SYMPTOMS
DIARRHEA: 0
NAUSEA: 0
SHORTNESS OF BREATH: 0
COUGH: 0
VOMITING: 0
ABDOMINAL PAIN: 0

## 2023-08-04 ENCOUNTER — TELEPHONE (OUTPATIENT)
Dept: PHARMACY | Age: 58
End: 2023-08-04

## 2023-08-04 RX ORDER — DULAGLUTIDE 1.5 MG/.5ML
INJECTION, SOLUTION SUBCUTANEOUS
Qty: 6 ML | Refills: 1 | Status: SHIPPED | OUTPATIENT
Start: 2023-08-04

## 2023-08-04 NOTE — TELEPHONE ENCOUNTER
Patient called requesting refill of Trulicity 1.5 mg. Rx approved.      Barbara Saeed, PharmD, BCPS  [facility] Medication Management [place]: [address]: [phone]  8/4/2023 1:26 PM    For Pharmacy Admin Tracking Only    Program: Medication Management  Time Spent (min): 10

## 2023-10-03 ENCOUNTER — TELEMEDICINE (OUTPATIENT)
Dept: PHARMACY | Age: 58
End: 2023-10-03

## 2023-10-03 DIAGNOSIS — E11.9 TYPE 2 DIABETES MELLITUS WITHOUT COMPLICATION, WITHOUT LONG-TERM CURRENT USE OF INSULIN (HCC): Primary | ICD-10-CM

## 2023-10-03 NOTE — PROGRESS NOTES
CLINICAL PHARMACY NOTE--Diabetes    Sunitha Horton is a 61 y.o. male with PMHX significant for depression, HTN, HLD and Type 2 Diabetes referred by Dr Amrita Rubio for diabetes counseling and medication management. Spoke with pt over the phone. Interval update:  Got new job! Much happier. Will have new insurance Nov 3. Not sure of formulary/benefits. Treatment Adherence:  Diet: drinks hot tea with 2 peppermints qAM (doesn't like coffee). Only eats when hungry. Has lunch at work and light dinner. Loves raw veggies and spinach salads, but doesn't eat often. Used to juice with beets, kale, spinach, carrots, apple, but someone told him it was high in sugar. Trying to cut out fried foods. Drinks Red Bull 1x per month. Likes sweet tea, but does not drink often. Doesn't eat \"sugary stuff. \" Water \"unlimited\" Yeti 2-3 per day. Metamucil 1x/week. No sugar in the house. Eats a lot of fruit, mostly for breakfast. Drinks protein shakes-- used protein powder-- but stopped due to constipation. Will drink a boost every once in awhile if wants something sweet. Instead of eating snacks from the vending machine, he is eating jaramillo's double noodle soup (whole can= 42.5 g carbs) for a snack. He also eats baby food (fruit). Recommended pt switch to regular chx noodle soup (whole can = 20g carb) and add protein powder to his baby food. Exercise: job is strenuous- works long hours in [employer] and is affecting his mental health (looking for different job), cannot go to gym 2/2 COVID. Used to [place] at the gym\" and enjoys working out. Extra hours at work, is more active. Weight trend: decreasing. Wanting to lose more weight, increase in trulicity is helping. Has lost a couple pounds. Barriers: cost of meds, COVID restrictions. He was able to get Trulicity and Cuca from pharmacy with no issues after long discussion with pharmacy/insurance last visit.  Now has to get medications from [facility] as they are the

## 2023-10-18 DIAGNOSIS — I10 ESSENTIAL HYPERTENSION: ICD-10-CM

## 2023-10-18 RX ORDER — AMLODIPINE BESYLATE 10 MG/1
TABLET ORAL
Qty: 90 TABLET | Refills: 1 | Status: SHIPPED | OUTPATIENT
Start: 2023-10-18

## 2023-10-18 RX ORDER — LOSARTAN POTASSIUM 100 MG/1
TABLET ORAL
Qty: 90 TABLET | Refills: 1 | Status: SHIPPED | OUTPATIENT
Start: 2023-10-18

## 2023-10-18 NOTE — TELEPHONE ENCOUNTER
Medication:   Requested Prescriptions     Pending Prescriptions Disp Refills    amLODIPine (NORVASC) 10 MG tablet [Pharmacy Med Name: AMLODIPINE BESYLATE 10MG TABLETS] 90 tablet 1     Sig: TAKE 1 TABLET BY MOUTH EVERY DAY    dapagliflozin (FARXIGA) 5 MG tablet [Pharmacy Med Name: FARXIGA 5MG TABLETS] 90 tablet 1     Sig: TAKE 1 TABLET BY MOUTH EVERY MORNING    losartan (COZAAR) 100 MG tablet [Pharmacy Med Name: LOSARTAN 100MG TABLETS] 90 tablet 1     Sig: TAKE 1 TABLET BY MOUTH EVERY DAY     Last Filled:  7/20/2023    Last appt: 7/14/2023   Next appt: 1/19/2024

## 2023-11-02 DIAGNOSIS — I10 ESSENTIAL HYPERTENSION: ICD-10-CM

## 2023-11-02 RX ORDER — LOSARTAN POTASSIUM 100 MG/1
TABLET ORAL
Qty: 90 TABLET | Refills: 1 | Status: SHIPPED | OUTPATIENT
Start: 2023-11-02

## 2023-11-06 ENCOUNTER — TELEPHONE (OUTPATIENT)
Dept: ADMINISTRATIVE | Age: 58
End: 2023-11-06

## 2023-11-06 NOTE — TELEPHONE ENCOUNTER
The medication is APPROVED    Your request was approved based on the initial information provided at the time of the coverage request submission. Please allow additional time for the final decision to be made and added to the patient's account.     Will upload dates once received.

## 2023-11-06 NOTE — TELEPHONE ENCOUNTER
Submitted PA for TRULICITY 1.5MG/0.5ML  Via CMM (Key: [id number]) STATUS: PENDING.    Follow up done daily; if no response in three days we will refax for status check.  If another three days goes by with no response we will call the insurance for status.

## 2023-11-07 ENCOUNTER — TELEMEDICINE (OUTPATIENT)
Dept: PHARMACY | Age: 58
End: 2023-11-07

## 2023-11-07 DIAGNOSIS — E11.9 TYPE 2 DIABETES MELLITUS WITHOUT COMPLICATION, WITHOUT LONG-TERM CURRENT USE OF INSULIN (HCC): Primary | ICD-10-CM

## 2023-11-07 NOTE — PROGRESS NOTES
CLINICAL PHARMACY NOTE--Diabetes    Dasia Thorne is a 60 y.o. male with PMHX significant for depression, HTN, HLD and Type 2 Diabetes referred by Dr Cecilia Jean-Baptiste for diabetes counseling and medication management. Spoke with pt over the phone. Interval update: Got new job! Much happier. Now has new [employer] but struggling with cost of meds. Has $2,600 deductible. Not sure of mail order or preferred pharm. Reports the following costs:  Trulicity $141  Amlo $84   Metformin $24  Vishnu Done ? Atorva $45    Treatment Adherence:  Diet: drinks hot tea with 2 peppermints qAM (doesn't like coffee). Only eats when hungry. Has lunch at work and light dinner. Loves raw veggies and spinach salads, but doesn't eat often. Used to juice with beets, kale, spinach, carrots, apple, but someone told him it was high in sugar. Trying to cut out fried foods. Drinks Red Bull 1x per month. Likes sweet tea, but does not drink often. Doesn't eat \"sugary stuff. \" Water \"unlimited\" Yeti 2-3 per day. Metamucil 1x/week. No sugar in the house. Eats a lot of fruit, mostly for breakfast. Drinks protein shakes-- used protein powder-- but stopped due to constipation. Will drink a boost every once in awhile if wants something sweet. Instead of eating snacks from the vending machine, he is eating jaramillo's double noodle soup (whole can= 42.5 g carbs) for a snack. He also eats baby food (fruit). Recommended pt switch to regular chx noodle soup (whole can = 20g carb) and add protein powder to his baby food. Exercise: job is strenuous- works long hours in [employer] and is affecting his mental health (looking for different job), cannot go to gym 2/2 COVID. Used to [place] at the gym\" and enjoys working out. Extra hours at work, is more active. Weight trend: decreasing. Wanting to lose more weight, increase in trulicity is helping. Has lost a couple pounds. Barriers: cost of meds, COVID restrictions.  He was able to get

## 2023-11-07 NOTE — TELEPHONE ENCOUNTER
The medication is APPROVED FROM 11/06/23 THRU 11/06/24    If this requires a response please respond to the pool ( P MHCX P[facility] MEDICATION PRE-AUTH).      Thank you please advise patient.

## 2023-11-14 ENCOUNTER — TELEPHONE (OUTPATIENT)
Dept: PHARMACY | Age: 58
End: 2023-11-14

## 2023-11-14 DIAGNOSIS — E11.9 TYPE 2 DIABETES MELLITUS WITHOUT COMPLICATION, WITHOUT LONG-TERM CURRENT USE OF INSULIN (HCC): Primary | ICD-10-CM

## 2023-11-14 NOTE — TELEPHONE ENCOUNTER
Pt called.   -Trulicity covered at $52 and losartan at $15.  -Farxiga cost $395.44 for 30 ds even with savings card.      Called pharmacy and insurance company to troubleshoot  -Jardiance submitted w/ savings card and cost $0.     Rossy Rodríguez, PharmD, Shelvia Epley  [facility]   [facility] Ph: [phone]  [place] Ph: [phone]  11/14/2023 3:58 PM    For Pharmacy Admin Tracking Only    Program: Medication Management  CPA in place:  Yes  Recommendation Provided To: Patient/Caregiver: 2 via Telephone and Pharmacy: 2  Intervention Detail: Benefit Assistance, Discontinued Rx: 1, reason: Cost/Formulary Change, New Rx: 1, reason: Cost/Formulary Change, and Patient Access Assistance/Sample Provided  Intervention Accepted By: Patient/Caregiver: 2 and Pharmacy: 2  Gap Closed?: Yes   Time Spent (min): 45

## 2024-01-17 ENCOUNTER — TELEPHONE (OUTPATIENT)
Dept: PHARMACY | Age: 59
End: 2024-01-17

## 2024-01-17 DIAGNOSIS — E11.9 TYPE 2 DIABETES MELLITUS WITHOUT COMPLICATION, WITHOUT LONG-TERM CURRENT USE OF INSULIN (HCC): Primary | ICD-10-CM

## 2024-01-18 RX ORDER — DULAGLUTIDE 0.75 MG/.5ML
1.5 INJECTION, SOLUTION SUBCUTANEOUS WEEKLY
Qty: 4 ML | Refills: 1 | Status: SHIPPED | OUTPATIENT
Start: 2024-01-18 | End: 2024-01-19 | Stop reason: CLARIF

## 2024-01-18 NOTE — TELEPHONE ENCOUNTER
Pt called stating his Jardiance is $616.  He has some Farxiga left, and another 30ds of Jardiance he still has before running out of medication. He has a $2600- medication deductible. Farixga cannot be refilled until 1/21/24 because pharmacy ran RX on 11/14/23 and the RX was never backed out. Trulicity was covered at $25 through savings card ($985 toward deductible); however 1.5 mg is on backorder. Submitted RX for 0.75 mg dose and isntructed to inject 2 doses per week. PA completed. Pt advised to contact the clinic when he is running low on meds so we can resubmit meds with savings card to check coverage.     Insurance phone: [phone]  Member -[phone]    Radha Hopson, JimD, BCACP  [facility]   [facility] [place] Ph: [phone]  [facility] [place] Ph: [phone]  1/18/2024 3:31 PM    For Pharmacy Admin Tracking Only    Program: Medication Management  CPA in place:  Yes  Recommendation Provided To: Patient/Caregiver: 1 via In person and Other: 2  Intervention Detail: Benefit Assistance, Dose Adjustment: 1, reason: Therapy De-escalation, and Patient Access Assistance/Sample Provided  Intervention Accepted By: Patient/Caregiver: 1 and Other: 2  Gap Closed?: Yes   Time Spent (min): 60

## 2024-01-19 ENCOUNTER — OFFICE VISIT (OUTPATIENT)
Dept: PRIMARY CARE CLINIC | Age: 59
End: 2024-01-19
Payer: COMMERCIAL

## 2024-01-19 VITALS
WEIGHT: 252.6 LBS | OXYGEN SATURATION: 99 % | DIASTOLIC BLOOD PRESSURE: 78 MMHG | BODY MASS INDEX: 39.56 KG/M2 | SYSTOLIC BLOOD PRESSURE: 133 MMHG | HEART RATE: 97 BPM

## 2024-01-19 DIAGNOSIS — E78.2 MIXED HYPERLIPIDEMIA: ICD-10-CM

## 2024-01-19 DIAGNOSIS — E11.9 TYPE 2 DIABETES MELLITUS WITHOUT COMPLICATION, WITHOUT LONG-TERM CURRENT USE OF INSULIN (HCC): Primary | ICD-10-CM

## 2024-01-19 DIAGNOSIS — I10 PRIMARY HYPERTENSION: ICD-10-CM

## 2024-01-19 LAB — HBA1C MFR BLD: 6.9 %

## 2024-01-19 PROCEDURE — 83036 HEMOGLOBIN GLYCOSYLATED A1C: CPT | Performed by: FAMILY MEDICINE

## 2024-01-19 PROCEDURE — 3078F DIAST BP <80 MM HG: CPT | Performed by: FAMILY MEDICINE

## 2024-01-19 PROCEDURE — 3044F HG A1C LEVEL LT 7.0%: CPT | Performed by: FAMILY MEDICINE

## 2024-01-19 PROCEDURE — 3075F SYST BP GE 130 - 139MM HG: CPT | Performed by: FAMILY MEDICINE

## 2024-01-19 PROCEDURE — 99214 OFFICE O/P EST MOD 30 MIN: CPT | Performed by: FAMILY MEDICINE

## 2024-01-19 NOTE — PROGRESS NOTES
unexpected weight change.   Respiratory:  Negative for cough and shortness of breath.    Cardiovascular:  Negative for chest pain, palpitations and leg swelling.   Gastrointestinal:  Negative for abdominal pain, diarrhea, nausea and vomiting.       /78   Pulse 97   Wt 114.6 kg (252 lb 9.6 oz)   SpO2 99%   BMI 39.56 kg/m²      Physical Exam  Vitals reviewed.   Constitutional:       General: He is not in acute distress.     Appearance: He is well-developed.   Eyes:      General: No scleral icterus.     Conjunctiva/sclera: Conjunctivae normal.   Neck:      Thyroid: No thyromegaly.   Cardiovascular:      Rate and Rhythm: Normal rate and regular rhythm.      Heart sounds: No murmur heard.  Pulmonary:      Effort: Pulmonary effort is normal. No respiratory distress.      Breath sounds: Normal breath sounds.   Abdominal:      General: There is no distension.      Palpations: Abdomen is soft.      Tenderness: There is no abdominal tenderness.   Musculoskeletal:      Cervical back: Neck supple.      Right lower leg: No edema.      Left lower leg: No edema.   Lymphadenopathy:      Cervical: No cervical adenopathy.   Skin:     General: Skin is warm and dry.      Capillary Refill: Capillary refill takes less than 2 seconds.   Neurological:      General: No focal deficit present.      Mental Status: He is alert and oriented to person, place, and time. Mental status is at baseline.   Psychiatric:         Mood and Affect: Mood normal.         Assessment:  Encounter Diagnoses   Name Primary?    Type 2 diabetes mellitus without complication, without long-term current use of insulin (HCC) Yes    Primary hypertension     Mixed hyperlipidemia        Plan:    - Chronic conditions stable. Will continue working on diet. Samples of Farxiga provided. Will start Jardiance once completed. Following with Clinical Pharm.   - HTN/HLD stable. Fasting labs this summer.     New Prescriptions    No medications on file        Orders Placed

## 2024-01-23 RX ORDER — DAPAGLIFLOZIN 5 MG/1
TABLET, FILM COATED ORAL
COMMUNITY
Start: 2023-11-14

## 2024-01-23 ASSESSMENT — ENCOUNTER SYMPTOMS
VOMITING: 0
NAUSEA: 0
COUGH: 0
SHORTNESS OF BREATH: 0
ABDOMINAL PAIN: 0
DIARRHEA: 0

## 2024-02-13 ENCOUNTER — TELEMEDICINE (OUTPATIENT)
Dept: PHARMACY | Age: 59
End: 2024-02-13

## 2024-02-13 DIAGNOSIS — E11.9 TYPE 2 DIABETES MELLITUS WITHOUT COMPLICATION, WITHOUT LONG-TERM CURRENT USE OF INSULIN (HCC): Primary | ICD-10-CM

## 2024-02-13 RX ORDER — TIRZEPATIDE 2.5 MG/.5ML
2.5 INJECTION, SOLUTION SUBCUTANEOUS WEEKLY
Qty: 2 ML | Refills: 0 | Status: SHIPPED | OUTPATIENT
Start: 2024-02-13 | End: 2024-02-14

## 2024-02-13 RX ORDER — DULAGLUTIDE 1.5 MG/.5ML
1.5 INJECTION, SOLUTION SUBCUTANEOUS WEEKLY
Qty: 2 ML | Refills: 5 | Status: SHIPPED | OUTPATIENT
Start: 2024-02-13 | End: 2024-02-14 | Stop reason: ALTCHOICE

## 2024-02-13 NOTE — PROGRESS NOTES
with friends/family). Encouraged patient to get back to the gym and exercise with others.    Health Maintenance Due   Topic Date Due    Hepatitis B vaccine (1 of 3 - 3-dose series) Never done    COVID-19 Vaccine (1) Never done    Pneumococcal 0-64 years Vaccine (1 - PCV) Never done    HIV screen  Never done    Diabetic retinal exam  Never done    Shingles vaccine (1 of 2) Never done    Diabetic foot exam  09/29/2021    Flu vaccine (1) Never done     Education provided:  Reviewed A1c and blood sugar goals with patient  Counseled on medication regimen and common side effects  Encouraged weight loss and aerobic exercise   Educated on diet     Follow up: 4 wk      Discussed with patient the Pharmacist Collaborative Practice Agreement.  Patient provided verbal and/or electronic (ex. [employer]) consent to participate in the collaborative practice agreement between the pharmacist and referred patient. This is in lieu of paper consent due to COVID-19 precautions and the use of remote/virtual visits.     Time spent with patient: 30 mins    Radha Hopson, PharmD, BCACP  [facility]   [facility] Ph: [phone]  [facility] Ph: [phone]  2/13/2024 8:41 AM    For Pharmacy Admin Tracking Only    Program: Medication Management  CPA in place:  Yes  Recommendation Provided To: Patient/Caregiver: 4 via Telephone  Intervention Detail: Discontinued Rx: 1, reason: Cost/Formulary Change, Lab(s) Ordered, New Rx: 1, reason: Cost/Formulary Change, and Patient Access Assistance/Sample Provided  Intervention Accepted By: Patient/Caregiver: 4  Gap Closed?: Yes   Time Spent (min): 60

## 2024-02-14 RX ORDER — TIRZEPATIDE 2.5 MG/.5ML
2.5 INJECTION, SOLUTION SUBCUTANEOUS WEEKLY
Qty: 2 ML | Refills: 2 | Status: SHIPPED | OUTPATIENT
Start: 2024-02-14

## 2024-03-19 ENCOUNTER — TELEPHONE (OUTPATIENT)
Dept: PHARMACY | Age: 59
End: 2024-03-19

## 2024-03-19 DIAGNOSIS — E11.9 TYPE 2 DIABETES MELLITUS WITHOUT COMPLICATION, WITHOUT LONG-TERM CURRENT USE OF INSULIN (HCC): Primary | ICD-10-CM

## 2024-03-19 RX ORDER — TIRZEPATIDE 5 MG/.5ML
5 INJECTION, SOLUTION SUBCUTANEOUS WEEKLY
Qty: 2 ML | Refills: 2 | Status: SHIPPED | OUTPATIENT
Start: 2024-03-19

## 2024-03-19 NOTE — TELEPHONE ENCOUNTER
Pt called- he is out of refills on mounjaro 2.5 mg. He is tolerating well. Would like to increase to 5 mg weekly. Counseled on possible side effects. Will f/u 1 month and cut back on oral therapy if warranted.     Radha Hopson, PharmD, BCACP  [facility]   [facility] [place] Ph: [phone]  [facility] [place] Ph: [phone]  3/19/2024 11:35 AM    For Pharmacy Admin Tracking Only    Intervention Detail: Dose Adjustment: 1, reason: Therapy Optimization  Total # of Interventions Recommended: 1  Total # of Interventions Accepted: 1  Time Spent (min): 10

## 2024-04-22 ENCOUNTER — TELEMEDICINE (OUTPATIENT)
Dept: PHARMACY | Age: 59
End: 2024-04-22

## 2024-04-22 DIAGNOSIS — E11.9 TYPE 2 DIABETES MELLITUS WITHOUT COMPLICATION, WITHOUT LONG-TERM CURRENT USE OF INSULIN (HCC): Primary | ICD-10-CM

## 2024-04-22 NOTE — PROGRESS NOTES
CLINICAL PHARMACY NOTE--Diabetes    Chandler Beltre is a 58 y.o. male with PMHX significant for depression, HTN, HLD and Type 2 Diabetes referred by Dr COLLEEN García for diabetes counseling and medication management. Spoke with pt over the phone.     Interval update: Patient feels the fluoxetine isn't working well for him. Mental health has been stable, states he does well when isolated and away from others. He doesn't check his glucose levels regularly. Last check was ~1 month ago, reports it was a normal level. Doing well on the Mounjaro.     Treatment Adherence:  Diet: drinks hot tea with 2 peppermints qAM (doesn't like coffee).  Only eats when hungry. Has lunch at work and light dinner.  Loves raw veggies and spinach salads, but doesn't eat often. Used to juice with beets, kale, spinach, carrots, apple, but someone told him it was high in sugar.  Trying to cut out fried foods. Drinks Red Bull 1x per month. Likes sweet tea, but does not drink often. Doesn't eat \"sugary stuff.\" Water \"unlimited\" Yeti 2-3 per day.  Metamucil 1x/week. No sugar in the house. Eats a lot of fruit, mostly for breakfast. Drinks protein shakes-- used protein powder-- but stopped due to constipation. Will drink a boost every once in awhile if wants something sweet. Instead of eating snacks from the vending machine, he is eating jaramillo's double noodle soup (whole can= 42.5 g carbs) for a snack. He also eats baby food (fruit).  Recommended pt switch to regular chx noodle soup (whole can = 20g carb) and add protein powder to his baby food. Protein veggie shake every morning.    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 Used to \"live at the gym\" and enjoys working out. Extra hours at work, is more active.     Weight trend: decreasing. Wanting to lose more weight    Barriers: cost of meds, COVID restrictions. He was able to get

## 2024-05-20 ENCOUNTER — TELEPHONE (OUTPATIENT)
Dept: PHARMACY | Age: 59
End: 2024-05-20

## 2024-05-20 DIAGNOSIS — E11.9 TYPE 2 DIABETES MELLITUS WITHOUT COMPLICATION, WITHOUT LONG-TERM CURRENT USE OF INSULIN (HCC): Primary | ICD-10-CM

## 2024-05-20 RX ORDER — BLOOD SUGAR DIAGNOSTIC
STRIP MISCELLANEOUS
Qty: 200 EACH | Refills: 11 | Status: SHIPPED | OUTPATIENT
Start: 2024-05-20

## 2024-05-20 RX ORDER — METFORMIN HYDROCHLORIDE 500 MG/1
1000 TABLET, EXTENDED RELEASE ORAL
Qty: 180 TABLET | Refills: 3 | Status: SHIPPED | OUTPATIENT
Start: 2024-05-20

## 2024-05-20 RX ORDER — LANCETS 30 GAUGE
EACH MISCELLANEOUS
Qty: 200 EACH | Refills: 11 | Status: SHIPPED | OUTPATIENT
Start: 2024-05-20

## 2024-05-20 NOTE — TELEPHONE ENCOUNTER
Medication:   Requested Prescriptions     Pending Prescriptions Disp Refills    metFORMIN (GLUCOPHAGE-XR) 500 MG extended release tablet [Pharmacy Med Name: METFORMIN ER 500MG 24HR TABS] 180 tablet 3     Sig: TAKE 2 TABLETS BY MOUTH DAILY WITH BREAKFAST     Last Filled:  2/13/24    Last appt: 1/19/2024   Next appt: 7/19/2024

## 2024-05-20 NOTE — TELEPHONE ENCOUNTER
Pt LVM- needs refill on One touch verio strips and lancets.    Radha Hopson, PharmD, BCACP  [facility]   [facility] [place] Ph: [phone]  [facility] [place] Ph: [phone]  5/20/2024 8:24 AM    For Pharmacy Admin Tracking Only    Intervention Detail: Refill(s) Provided  Total # of Interventions Recommended: 1  Total # of Interventions Accepted: 1  Time Spent (min): 5

## 2024-05-21 DIAGNOSIS — E78.2 MIXED HYPERLIPIDEMIA: Primary | ICD-10-CM

## 2024-05-21 RX ORDER — ATORVASTATIN CALCIUM 80 MG/1
80 TABLET, FILM COATED ORAL DAILY
Qty: 90 TABLET | Refills: 3 | Status: SHIPPED | OUTPATIENT
Start: 2024-05-21

## 2024-05-21 NOTE — PROGRESS NOTES
Spoke with. He needs refill on statin. LDL has been consistently above 70 on atorvastatin 40 mg. Will increase to 80 mg daily. Counseled on change in dose and possible side effects.     Radha Hopson, PharmD, BCACP  [facility]   [facility] [place] Ph: [phone]  [facility] [place] Ph: [phone]  5/21/2024 9:04 AM    For Pharmacy Admin Tracking Only    Intervention Detail: Dose Adjustment: 1, reason: Therapy Optimization  Total # of Interventions Recommended: 1  Total # of Interventions Accepted: 1  Time Spent (min): 15

## 2024-06-04 DIAGNOSIS — E11.9 TYPE 2 DIABETES MELLITUS WITHOUT COMPLICATION, WITHOUT LONG-TERM CURRENT USE OF INSULIN (HCC): ICD-10-CM

## 2024-06-05 LAB
EST. AVERAGE GLUCOSE BLD GHB EST-MCNC: 134.1 MG/DL
HBA1C MFR BLD: 6.3 %

## 2024-06-26 DIAGNOSIS — I10 ESSENTIAL HYPERTENSION: ICD-10-CM

## 2024-06-26 RX ORDER — LOSARTAN POTASSIUM 100 MG/1
100 TABLET ORAL DAILY
Qty: 90 TABLET | Refills: 1 | Status: SHIPPED | OUTPATIENT
Start: 2024-06-26

## 2024-06-26 NOTE — PROGRESS NOTES
For Pharmacy Admin Tracking Only    Intervention Detail: Refill(s) Provided  Total # of Interventions Recommended: 1  Total # of Interventions Accepted: 1  Time Spent (min): 5

## 2024-07-16 ASSESSMENT — PATIENT HEALTH QUESTIONNAIRE - PHQ9
1. LITTLE INTEREST OR PLEASURE IN DOING THINGS: SEVERAL DAYS
SUM OF ALL RESPONSES TO PHQ9 QUESTIONS 1 & 2: 2
2. FEELING DOWN, DEPRESSED OR HOPELESS: SEVERAL DAYS

## 2024-07-19 ENCOUNTER — OFFICE VISIT (OUTPATIENT)
Dept: PRIMARY CARE CLINIC | Age: 59
End: 2024-07-19
Payer: COMMERCIAL

## 2024-07-19 VITALS
OXYGEN SATURATION: 98 % | BODY MASS INDEX: 37.12 KG/M2 | HEART RATE: 90 BPM | WEIGHT: 237 LBS | DIASTOLIC BLOOD PRESSURE: 72 MMHG | SYSTOLIC BLOOD PRESSURE: 133 MMHG

## 2024-07-19 DIAGNOSIS — Z00.00 ROUTINE PHYSICAL EXAMINATION: Primary | ICD-10-CM

## 2024-07-19 DIAGNOSIS — N52.9 ERECTILE DYSFUNCTION, UNSPECIFIED ERECTILE DYSFUNCTION TYPE: ICD-10-CM

## 2024-07-19 DIAGNOSIS — Z12.5 SCREENING FOR PROSTATE CANCER: ICD-10-CM

## 2024-07-19 DIAGNOSIS — E11.9 TYPE 2 DIABETES MELLITUS WITHOUT COMPLICATION, WITHOUT LONG-TERM CURRENT USE OF INSULIN (HCC): ICD-10-CM

## 2024-07-19 PROCEDURE — 3078F DIAST BP <80 MM HG: CPT | Performed by: FAMILY MEDICINE

## 2024-07-19 PROCEDURE — 99396 PREV VISIT EST AGE 40-64: CPT | Performed by: FAMILY MEDICINE

## 2024-07-19 PROCEDURE — 3075F SYST BP GE 130 - 139MM HG: CPT | Performed by: FAMILY MEDICINE

## 2024-07-22 ENCOUNTER — TELEMEDICINE (OUTPATIENT)
Dept: PHARMACY | Age: 59
End: 2024-07-22

## 2024-07-22 DIAGNOSIS — E11.9 TYPE 2 DIABETES MELLITUS WITHOUT COMPLICATION, WITHOUT LONG-TERM CURRENT USE OF INSULIN (HCC): ICD-10-CM

## 2024-07-22 RX ORDER — TIRZEPATIDE 5 MG/.5ML
5 INJECTION, SOLUTION SUBCUTANEOUS WEEKLY
Qty: 2 ML | Refills: 5 | Status: SHIPPED | OUTPATIENT
Start: 2024-07-22

## 2024-07-22 RX ORDER — DAPAGLIFLOZIN 5 MG/1
5 TABLET, FILM COATED ORAL EVERY MORNING
Qty: 90 TABLET | Refills: 1 | Status: SHIPPED | OUTPATIENT
Start: 2024-07-22

## 2024-07-22 ASSESSMENT — ENCOUNTER SYMPTOMS
COUGH: 0
NAUSEA: 0
VOMITING: 0
SHORTNESS OF BREATH: 0
DIARRHEA: 0
ABDOMINAL PAIN: 0

## 2024-07-22 NOTE — PROGRESS NOTES
CLINICAL PHARMACY NOTE--Diabetes    Chandler Beltre is a 58 y.o. male with PMHX significant for depression, HTN, HLD and Type 2 Diabetes referred by Dr COLLEEN García for diabetes counseling and medication management. Spoke with pt over the phone.     Interval update: Patient has lost 15 # in 6 months. His goal weight is 200#.. Mental health has been stable. He doesn't check his glucose levels regularly.  Doing well on the Mounjaro.     Treatment Adherence:  Diet: drinks hot tea with 2 peppermints qAM (doesn't like coffee).  Only eats when hungry. Has lunch at work and light dinner.  Loves raw veggies and spinach salads, but doesn't eat often. Used to juice with beets, kale, spinach, carrots, apple, but someone told him it was high in sugar.  Trying to cut out fried foods. Drinks Red Bull 1x per month. Likes sweet tea, but does not drink often. Doesn't eat \"sugary stuff.\" Water \"unlimited\" Yeti 2-3 per day.  Metamucil 1x/week. No sugar in the house. Eats a lot of fruit, mostly for breakfast. Drinks protein shakes-- used protein powder-- but stopped due to constipation. Will drink a boost every once in awhile if wants something sweet. Instead of eating snacks from the vending machine, he is eating jaramillo's double noodle soup (whole can= 42.5 g carbs) for a snack. He also eats baby food (fruit).  Recommended pt switch to regular chx noodle soup (whole can = 20g carb) and add protein powder to his baby food. Protein veggie shake every morning.    Exercise: job is strenuous- works long hours in [employer] and is affecting his mental health (looking for different job), cannot go to gym 2/2 COVID. Used to \"live at the gym\" and enjoys working out. Extra hours at work, is more active.     Weight trend: decreasing. Wanting to lose more weight    Barriers: cost of meds, COVID restrictions. He was able to get Trulicity and Farxiga from pharmacy with no issues after long discussion with pharmacy/insurance last visit. Now has

## 2024-07-22 NOTE — PROGRESS NOTES
normal.      Left Ear: Tympanic membrane normal.   Eyes:      General: No scleral icterus.     Conjunctiva/sclera: Conjunctivae normal.   Neck:      Thyroid: No thyromegaly.   Cardiovascular:      Rate and Rhythm: Normal rate and regular rhythm.      Heart sounds: No murmur heard.  Pulmonary:      Effort: Pulmonary effort is normal. No respiratory distress.      Breath sounds: Normal breath sounds.   Abdominal:      General: There is no distension.      Palpations: Abdomen is soft.      Tenderness: There is no abdominal tenderness.   Musculoskeletal:      Cervical back: Neck supple.      Right lower leg: No edema.      Left lower leg: No edema.   Lymphadenopathy:      Cervical: No cervical adenopathy.   Skin:     General: Skin is warm and dry.      Capillary Refill: Capillary refill takes less than 2 seconds.   Neurological:      General: No focal deficit present.      Mental Status: He is alert and oriented to person, place, and time. Mental status is at baseline.   Psychiatric:         Mood and Affect: Mood normal.         Assessment:  Encounter Diagnoses   Name Primary?    Routine physical examination Yes    Type 2 diabetes mellitus without complication, without long-term current use of insulin ([facility])     Screening for prostate cancer     Erectile dysfunction, unspecified erectile dysfunction type        Plan:    - Chronic conditions stable.   - Declines immunizations.     New Prescriptions    No medications on file        Orders Placed This Encounter   Procedures    Comprehensive Metabolic Panel    Lipid, Fasting    CBC with Auto Differential    Microalbumin / Creatinine Urine Ratio    PSA Screening    Vitamin B12 & Folate    Testosterone, free, total        Return in about 6 months (around 1/19/2025) for Follow up DM.         Tiffany García DO

## 2024-08-05 ENCOUNTER — TELEPHONE (OUTPATIENT)
Dept: PHARMACY | Age: 59
End: 2024-08-05

## 2024-08-05 ENCOUNTER — SCHEDULED TELEPHONE ENCOUNTER (OUTPATIENT)
Dept: PHARMACY | Age: 59
End: 2024-08-05

## 2024-08-05 DIAGNOSIS — E11.9 TYPE 2 DIABETES MELLITUS WITHOUT COMPLICATION, WITHOUT LONG-TERM CURRENT USE OF INSULIN (HCC): Primary | ICD-10-CM

## 2024-08-05 RX ORDER — TIRZEPATIDE 7.5 MG/.5ML
7.5 INJECTION, SOLUTION SUBCUTANEOUS WEEKLY
Qty: 2 ML | Refills: 1 | Status: SHIPPED | OUTPATIENT
Start: 2024-08-05

## 2024-08-05 NOTE — PROGRESS NOTES
CLINICAL PHARMACY NOTE--Diabetes    Chandler Beltre is a 58 y.o. male with PMHX significant for depression, HTN, HLD and Type 2 Diabetes referred by Dr COLLEEN García for diabetes counseling and medication management. Spoke with pt over the phone.     Interval update: Patient has lost 15 # in 6 months. His goal weight is 200#.. Mental health has been stable. He doesn't check his glucose levels regularly.  Doing well on the Mounjaro.  Wants to prepare for upcoming open enrollment: $150 per pay or $20 per pay for high deductible plan ($2600).    Treatment Adherence:  Diet: drinks hot tea with 2 peppermints qAM (doesn't like coffee).  Only eats when hungry. Has lunch at work and light dinner.  Loves raw veggies and spinach salads, but doesn't eat often. Used to juice with beets, kale, spinach, carrots, apple, but someone told him it was high in sugar.  Trying to cut out fried foods. Drinks Red Bull 1x per month. Likes sweet tea, but does not drink often. Doesn't eat \"sugary stuff.\" Water \"unlimited\" Yeti 2-3 per day.  Metamucil 1x/week. No sugar in the house. Eats a lot of fruit, mostly for breakfast. Drinks protein shakes-- used protein powder-- but stopped due to constipation. Will drink a boost every once in awhile if wants something sweet. Instead of eating snacks from the vending machine, he is eating jaramillo's double noodle soup (whole can= 42.5 g carbs) for a snack. He also eats baby food (fruit).  Recommended pt switch to regular chx noodle soup (whole can = 20g carb) and add protein powder to his baby food. Protein veggie shake every morning.    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 Used to \"live at the gym\" and enjoys working out. Extra hours at work, is more active.     Weight trend: decreasing. Wanting to lose more weight    Barriers: cost of meds, COVID restrictions. He was able to get Trulicity

## 2024-08-05 NOTE — TELEPHONE ENCOUNTER
Pt called to ask about open enrollment - scheduled  telephone appt for today    Radha Hopson, PharmD, BCACP  [facility]   [facility] [place] Ph: [phone]  [facility] [place] Ph: [phone]  8/5/2024 8:48 AM

## 2024-08-21 RX ORDER — AMLODIPINE BESYLATE 10 MG/1
TABLET ORAL
Qty: 90 TABLET | Refills: 1 | Status: SHIPPED | OUTPATIENT
Start: 2024-08-21

## 2024-08-21 NOTE — TELEPHONE ENCOUNTER
Medication:   Requested Prescriptions     Pending Prescriptions Disp Refills    amLODIPine (NORVASC) 10 MG tablet 90 tablet 1     Sig: TAKE 1 TABLET BY MOUTH EVERY DAY     Last Filled:  n/a    Last appt: 7/19/2024   Next appt: 1/17/2025

## 2024-09-04 DIAGNOSIS — Z12.5 SCREENING FOR PROSTATE CANCER: ICD-10-CM

## 2024-09-04 DIAGNOSIS — N52.9 ERECTILE DYSFUNCTION, UNSPECIFIED ERECTILE DYSFUNCTION TYPE: ICD-10-CM

## 2024-09-04 DIAGNOSIS — E11.9 TYPE 2 DIABETES MELLITUS WITHOUT COMPLICATION, WITHOUT LONG-TERM CURRENT USE OF INSULIN (HCC): ICD-10-CM

## 2024-09-04 LAB
25(OH)D3 SERPL-MCNC: 14 NG/ML
ALBUMIN SERPL-MCNC: 4.2 G/DL (ref 3.4–5)
ALBUMIN/GLOB SERPL: 1.3 {RATIO} (ref 1.1–2.2)
ALP SERPL-CCNC: 114 U/L (ref 40–129)
ALT SERPL-CCNC: 20 U/L (ref 10–40)
ANION GAP SERPL CALCULATED.3IONS-SCNC: 15 MMOL/L (ref 3–16)
AST SERPL-CCNC: 22 U/L (ref 15–37)
BASOPHILS # BLD: 0.1 K/UL (ref 0–0.2)
BASOPHILS NFR BLD: 1 %
BILIRUB SERPL-MCNC: 0.6 MG/DL (ref 0–1)
BUN SERPL-MCNC: 15 MG/DL (ref 7–20)
CALCIUM SERPL-MCNC: 9.7 MG/DL (ref 8.3–10.6)
CHLORIDE SERPL-SCNC: 99 MMOL/L (ref 99–110)
CHOLEST SERPL-MCNC: 177 MG/DL (ref 0–199)
CO2 SERPL-SCNC: 22 MMOL/L (ref 21–32)
CREAT SERPL-MCNC: 0.9 MG/DL (ref 0.9–1.3)
CREAT UR-MCNC: 151 MG/DL (ref 39–259)
DEPRECATED RDW RBC AUTO: 15.4 % (ref 12.4–15.4)
EOSINOPHIL # BLD: 0.2 K/UL (ref 0–0.6)
EOSINOPHIL NFR BLD: 1.8 %
EST. AVERAGE GLUCOSE BLD GHB EST-MCNC: 114 MG/DL
FOLATE SERPL-MCNC: 8.16 NG/ML (ref 4.78–24.2)
GFR SERPLBLD CREATININE-BSD FMLA CKD-EPI: >90 ML/MIN/{1.73_M2}
GLUCOSE SERPL-MCNC: 85 MG/DL (ref 70–99)
HBA1C MFR BLD: 5.6 %
HCT VFR BLD AUTO: 39.4 % (ref 40.5–52.5)
HDLC SERPL-MCNC: 46 MG/DL (ref 40–60)
HGB BLD-MCNC: 12.9 G/DL (ref 13.5–17.5)
LDL CHOLESTEROL: 115 MG/DL
LYMPHOCYTES # BLD: 2.4 K/UL (ref 1–5.1)
LYMPHOCYTES NFR BLD: 21.5 %
MCH RBC QN AUTO: 26.5 PG (ref 26–34)
MCHC RBC AUTO-ENTMCNC: 32.7 G/DL (ref 31–36)
MCV RBC AUTO: 81 FL (ref 80–100)
MICROALBUMIN UR DL<=1MG/L-MCNC: <1.2 MG/DL
MICROALBUMIN/CREAT UR: NORMAL MG/G (ref 0–30)
MONOCYTES # BLD: 0.9 K/UL (ref 0–1.3)
MONOCYTES NFR BLD: 7.9 %
NEUTROPHILS # BLD: 7.5 K/UL (ref 1.7–7.7)
NEUTROPHILS NFR BLD: 67.8 %
PLATELET # BLD AUTO: 387 K/UL (ref 135–450)
PMV BLD AUTO: 8.8 FL (ref 5–10.5)
POTASSIUM SERPL-SCNC: 4.5 MMOL/L (ref 3.5–5.1)
PROT SERPL-MCNC: 7.4 G/DL (ref 6.4–8.2)
PSA SERPL DL<=0.01 NG/ML-MCNC: 0.83 NG/ML (ref 0–4)
RBC # BLD AUTO: 4.86 M/UL (ref 4.2–5.9)
SODIUM SERPL-SCNC: 136 MMOL/L (ref 136–145)
TRIGL SERPL-MCNC: 79 MG/DL (ref 0–150)
VIT B12 SERPL-MCNC: 567 PG/ML (ref 211–911)
VLDLC SERPL CALC-MCNC: 16 MG/DL
WBC # BLD AUTO: 11 K/UL (ref 4–11)

## 2024-09-07 LAB
SHBG SERPL-SCNC: 52 NMOL/L (ref 19–76)
TESTOST FREE SERPL-MCNC: 66.7 PG/ML (ref 47–244)
TESTOST SERPL-MCNC: 438 NG/DL (ref 193–740)

## 2024-09-10 ENCOUNTER — SCHEDULED TELEPHONE ENCOUNTER (OUTPATIENT)
Dept: PHARMACY | Age: 59
End: 2024-09-10

## 2024-09-10 DIAGNOSIS — E11.9 TYPE 2 DIABETES MELLITUS WITHOUT COMPLICATION, WITHOUT LONG-TERM CURRENT USE OF INSULIN (HCC): ICD-10-CM

## 2024-09-10 RX ORDER — DAPAGLIFLOZIN 5 MG/1
5 TABLET, FILM COATED ORAL EVERY MORNING
Qty: 90 TABLET | Refills: 1 | Status: SHIPPED | OUTPATIENT
Start: 2024-09-10

## 2024-10-28 ENCOUNTER — TELEPHONE (OUTPATIENT)
Dept: PRIMARY CARE CLINIC | Age: 59
End: 2024-10-28

## 2024-10-28 NOTE — TELEPHONE ENCOUNTER
Called patient regarding clarification for message. Patient states he got a diabetic eye exam almost a week ago. He wanted give  this message as an FYI

## 2024-10-28 NOTE — TELEPHONE ENCOUNTER
----- Message from [employer] CLAIRE sent at 10/24/2024  1:35 PM EDT -----  Regarding: ECC Message to Provider  ECC Message to Provider    Relationship to Patient: Self     Additional Information     Pt have an eye exam last 10/19/24.    [facility] - [phone]    --------------------------------------------------------------------------------------------------------------------------    Call Back Information: OK to leave message on voicemail  Preferred Call Back Number: Phone [phone]

## 2024-11-04 ENCOUNTER — TELEPHONE (OUTPATIENT)
Dept: PHARMACY | Age: 59
End: 2024-11-04

## 2024-11-04 DIAGNOSIS — E11.9 TYPE 2 DIABETES MELLITUS WITHOUT COMPLICATION, WITHOUT LONG-TERM CURRENT USE OF INSULIN (HCC): Primary | ICD-10-CM

## 2024-11-04 RX ORDER — TIRZEPATIDE 7.5 MG/.5ML
7.5 INJECTION, SOLUTION SUBCUTANEOUS WEEKLY
Qty: 2 ML | Refills: 5 | Status: SHIPPED | OUTPATIENT
Start: 2024-11-04

## 2025-01-08 ENCOUNTER — TELEPHONE (OUTPATIENT)
Dept: PHARMACY | Age: 60
End: 2025-01-08

## 2025-01-08 DIAGNOSIS — E11.9 TYPE 2 DIABETES MELLITUS WITHOUT COMPLICATION, WITHOUT LONG-TERM CURRENT USE OF INSULIN (HCC): Primary | ICD-10-CM

## 2025-01-08 NOTE — TELEPHONE ENCOUNTER
Patient's referral to the Med Management clinic is expiring soon. Please order [id number] if you would like patient to remain enrolled in the clinic for diabetes management.     Thank you!   Radha Hopson, PharmD, BCACP  Medication Management Clinic   [facility] [place] Ph: [phone]  [facility] [place] Ph: [phone]  1/8/2025 10:06 AM

## 2025-01-13 DIAGNOSIS — I10 ESSENTIAL HYPERTENSION: ICD-10-CM

## 2025-01-14 RX ORDER — LOSARTAN POTASSIUM 100 MG/1
100 TABLET ORAL DAILY
Qty: 90 TABLET | Refills: 1 | Status: SHIPPED | OUTPATIENT
Start: 2025-01-14

## 2025-01-17 ENCOUNTER — OFFICE VISIT (OUTPATIENT)
Dept: PRIMARY CARE CLINIC | Age: 60
End: 2025-01-17
Payer: COMMERCIAL

## 2025-01-17 VITALS
HEART RATE: 93 BPM | OXYGEN SATURATION: 98 % | BODY MASS INDEX: 35.99 KG/M2 | SYSTOLIC BLOOD PRESSURE: 123 MMHG | WEIGHT: 229.8 LBS | DIASTOLIC BLOOD PRESSURE: 69 MMHG

## 2025-01-17 DIAGNOSIS — E78.2 MIXED HYPERLIPIDEMIA: ICD-10-CM

## 2025-01-17 DIAGNOSIS — M21.371 FOOT DROP, RIGHT: ICD-10-CM

## 2025-01-17 DIAGNOSIS — I10 PRIMARY HYPERTENSION: ICD-10-CM

## 2025-01-17 DIAGNOSIS — E11.9 TYPE 2 DIABETES MELLITUS WITHOUT COMPLICATION, WITHOUT LONG-TERM CURRENT USE OF INSULIN (HCC): Primary | ICD-10-CM

## 2025-01-17 LAB — HBA1C MFR BLD: 5.9 %

## 2025-01-17 PROCEDURE — 99214 OFFICE O/P EST MOD 30 MIN: CPT | Performed by: FAMILY MEDICINE

## 2025-01-17 PROCEDURE — 3078F DIAST BP <80 MM HG: CPT | Performed by: FAMILY MEDICINE

## 2025-01-17 PROCEDURE — 3074F SYST BP LT 130 MM HG: CPT | Performed by: FAMILY MEDICINE

## 2025-01-17 PROCEDURE — 3044F HG A1C LEVEL LT 7.0%: CPT | Performed by: FAMILY MEDICINE

## 2025-01-17 PROCEDURE — 83036 HEMOGLOBIN GLYCOSYLATED A1C: CPT | Performed by: FAMILY MEDICINE

## 2025-01-17 SDOH — ECONOMIC STABILITY: FOOD INSECURITY: WITHIN THE PAST 12 MONTHS, THE FOOD YOU BOUGHT JUST DIDN'T LAST AND YOU DIDN'T HAVE MONEY TO GET MORE.: NEVER TRUE

## 2025-01-17 SDOH — ECONOMIC STABILITY: FOOD INSECURITY: WITHIN THE PAST 12 MONTHS, YOU WORRIED THAT YOUR FOOD WOULD RUN OUT BEFORE YOU GOT MONEY TO BUY MORE.: NEVER TRUE

## 2025-01-17 ASSESSMENT — PATIENT HEALTH QUESTIONNAIRE - PHQ9
SUM OF ALL RESPONSES TO PHQ9 QUESTIONS 1 & 2: 0
SUM OF ALL RESPONSES TO PHQ QUESTIONS 1-9: 0
SUM OF ALL RESPONSES TO PHQ QUESTIONS 1-9: 0
2. FEELING DOWN, DEPRESSED OR HOPELESS: NOT AT ALL
1. LITTLE INTEREST OR PLEASURE IN DOING THINGS: NOT AT ALL
SUM OF ALL RESPONSES TO PHQ QUESTIONS 1-9: 0
SUM OF ALL RESPONSES TO PHQ QUESTIONS 1-9: 0

## 2025-01-17 NOTE — PROGRESS NOTES
6.5%  Increased risk of diabetes (Prediabetes): 5.7-6.4%  Glycemic Control: Nonpregnant Adults: <7.0%                    Pregnant: <6.0%          No data recorded       Prior to Visit Medications    Medication Sig Taking? Authorizing Provider   losartan (COZAAR) 100 MG tablet TAKE 1 TABLET BY MOUTH EVERY DAY  Tiffany García DO   Tirzepatide (MOUNJARO) 7.5 MG/0.5ML SOAJ Inject 7.5 mg into the skin once a week  Tiffany García DO   FARXIGA 5 MG tablet Take 1 tablet by mouth every morning  Tiffany García DO   amLODIPine (NORVASC) 10 MG tablet TAKE 1 TABLET BY MOUTH EVERY DAY  Tiffany García DO   atorvastatin (LIPITOR) 80 MG tablet Take 1 tablet by mouth daily  Tiffany García DO   blood glucose test strips (ONETOUCH VERIO) strip Use to check blood sugar BID before meals or as needed.  Tiffany García DO   Lancets MISC Use to check blood sugar BID before meals or as needed.  Tiffany García DO   FLUoxetine (PROZAC) 10 MG capsule Take 1 capsule by mouth daily  Tiffany García DO   Blood Glucose Monitoring Suppl (ONETOUCH VERIO) w/Device KIT Use to check blood sugar BID before meals or as needed.  Tiffany García DO   alprostadil, Vasodilator, (CAVERJECT) 20 MCG SOLR intracavernosal injection 20 mcg by IntraCAVernosal route  Provider, MD Steve        Past Medical History:   Diagnosis Date    Depression     Erectile dysfunction     HLD (hyperlipidemia)     Hypertension     Type II diabetes mellitus (HCC)         Social History     Tobacco Use    Smoking status: Never    Smokeless tobacco: Never   Substance Use Topics    Alcohol use: Yes    Drug use: No        Past Surgical History:   Procedure Laterality Date    MOUTH SURGERY          Allergies   Allergen Reactions    Fish-Derived Products Hives and Shortness Of Breath        Family History   Problem Relation Age of Onset    Heart Failure Mother     Heart Disease

## 2025-01-20 ENCOUNTER — PHARMACY VISIT (OUTPATIENT)
Dept: PHARMACY | Age: 60
End: 2025-01-20

## 2025-01-20 DIAGNOSIS — E11.9 TYPE 2 DIABETES MELLITUS WITHOUT COMPLICATION, WITHOUT LONG-TERM CURRENT USE OF INSULIN (HCC): Primary | ICD-10-CM

## 2025-01-20 RX ORDER — DAPAGLIFLOZIN 5 MG/1
5 TABLET, FILM COATED ORAL EVERY MORNING
Qty: 90 TABLET | Refills: 3 | Status: SHIPPED | OUTPATIENT
Start: 2025-01-20 | End: 2025-01-20

## 2025-01-20 RX ORDER — METFORMIN HYDROCHLORIDE 500 MG/1
1000 TABLET, EXTENDED RELEASE ORAL
COMMUNITY

## 2025-01-20 RX ORDER — DAPAGLIFLOZIN 5 MG/1
5 TABLET, FILM COATED ORAL EVERY MORNING
Qty: 90 TABLET | Refills: 3 | Status: SHIPPED | OUTPATIENT
Start: 2025-01-20

## 2025-01-20 NOTE — PROGRESS NOTES
Date    AST 22 09/04/2024    ALT 20 09/04/2024    BILITOT 0.6 09/04/2024    ALKPHOS 114 09/04/2024     No components found for: \"VITB12\"  No results found for: \"TSH\"     Medication list reviewed and updated.      The 10-year ASCVD risk score (Zayda DELEON, et al., 2019) is: 22.2%    Values used to calculate the score:      Age: 59 years      Sex: Male      Is Non- : Yes      Diabetic: Yes      Tobacco smoker: No      Systolic Blood Pressure: 123 mmHg      Is BP treated: Yes      HDL Cholesterol: 46 mg/dL      Total Cholesterol: 177 mg/dL    Assessment/Plan:   1. Type 2 diabetes mellitus without complication, without long-term current use of insulin ([facility])  A1c at goal (11.8 -> 8.0-->6.2-->6.0 --> 6.7-->6.4-->6.4-->6.2-->6.9-->6.3-->5.6-->5.9% on 1/17/25)  -continue mounjaro 7.5 mg weekly ($25 with savings card)  -restart farixga 5 mg (apply savings card)   -stop metformin 1000 mg XR daily  -Refusing vaccines.    -Encouraged pt to start going to gym again. Start with once per week, then incr to Fri and Sat. Commit to just 15 min to start, then increase time/frequency  -start vit D 4000 iu daily    2. Essential hypertension  Last BP at goal <130/80  No albuminuria   On ARB + SGLT2    3. Pure hypercholesterolemia  On high intensity statin-- atorvastatin 80mg, ldl 115, consider addition of zetia  10-yr ASCVD >20%  Repeat FLP: annually  -pt thinks he may still be taking 40 mg and will check dose when home.     4. Depression  -suffers depression mostly as a result of his job. Had job offers, but require vaccination. Not willing to get COVID vaccine despite recommendation from myself and his PCP.     -sxs improved with fluoxetine 10 mg QD  -Recommend asking PCP about sleep study referral. He has tried to do this but insurance will not cover it.  -Goals: Start exercise 1 day per week and build to 3 days per wk. Dedicate at least 1 hour each week to doing something you enjoy (Jewish, time with

## 2025-01-22 ASSESSMENT — ENCOUNTER SYMPTOMS: COUGH: 0

## 2025-02-12 RX ORDER — AMLODIPINE BESYLATE 10 MG/1
TABLET ORAL
Qty: 90 TABLET | Refills: 1 | Status: SHIPPED | OUTPATIENT
Start: 2025-02-12

## 2025-02-12 NOTE — TELEPHONE ENCOUNTER
Medication:   Requested Prescriptions     Pending Prescriptions Disp Refills    amLODIPine (NORVASC) 10 MG tablet [Pharmacy Med Name: AMLODIPINE BESYLATE 10MG TABLETS] 90 tablet 1     Sig: TAKE 1 TABLET BY MOUTH EVERY DAY     Last Filled:  11/16/24    Last appt: 1/17/2025   Next appt: 7/25/2025

## 2025-04-14 DIAGNOSIS — E78.2 MIXED HYPERLIPIDEMIA: ICD-10-CM

## 2025-04-15 RX ORDER — ATORVASTATIN CALCIUM 80 MG/1
80 TABLET, FILM COATED ORAL DAILY
Qty: 90 TABLET | Refills: 3 | Status: SHIPPED | OUTPATIENT
Start: 2025-04-15

## 2025-04-28 ENCOUNTER — PHARMACY VISIT (OUTPATIENT)
Dept: PHARMACY | Age: 60
End: 2025-04-28
Payer: COMMERCIAL

## 2025-04-28 VITALS
HEART RATE: 86 BPM | WEIGHT: 234 LBS | SYSTOLIC BLOOD PRESSURE: 144 MMHG | BODY MASS INDEX: 36.65 KG/M2 | DIASTOLIC BLOOD PRESSURE: 78 MMHG

## 2025-04-28 DIAGNOSIS — I10 ESSENTIAL HYPERTENSION: ICD-10-CM

## 2025-04-28 DIAGNOSIS — E11.9 TYPE 2 DIABETES MELLITUS WITHOUT COMPLICATION, WITHOUT LONG-TERM CURRENT USE OF INSULIN (HCC): ICD-10-CM

## 2025-04-28 DIAGNOSIS — E11.9 TYPE 2 DIABETES MELLITUS WITHOUT COMPLICATION, WITHOUT LONG-TERM CURRENT USE OF INSULIN (HCC): Primary | ICD-10-CM

## 2025-04-28 DIAGNOSIS — E78.2 MIXED HYPERLIPIDEMIA: ICD-10-CM

## 2025-04-28 PROCEDURE — 99213 OFFICE O/P EST LOW 20 MIN: CPT

## 2025-04-28 RX ORDER — TIRZEPATIDE 10 MG/.5ML
10 INJECTION, SOLUTION SUBCUTANEOUS WEEKLY
Qty: 2 ML | Refills: 5 | Status: SHIPPED | OUTPATIENT
Start: 2025-04-28

## 2025-04-28 NOTE — PROGRESS NOTES
CLINICAL PHARMACY NOTE--Diabetes    Chandler Beltre is a 59 y.o. male with PMHX significant for depression, HTN, HLD and Type 2 Diabetes referred by Dr COLLEEN García for diabetes counseling and medication management.      Interval update: Patient stopped metformin and has continued Farxiga + Mounjaro.  He changed insurance Jan 1 but meds remaind affordable with savings cards.  Pt continues to stress about work. Plans to semi retire next yr after applying for social security. Does not take prozac- makes him sleepy. BP elevated today in office. 165/78-->144/78. Difficult for him to remain quiet and calm during visit. Appears previous readings have been at goal, and pt did endorse recent stress. He is unable to check BP at home bc his cuff broke.    Farxiga savings card:  BIN# [id number] PCN# [id number] GRP# [id number] ID# [id number]  Mounjaro savings card:  BIN: [id number] PCN: [id number] GRP: [id number] ID: [id number]     Treatment Adherence:  Diet: drinks hot tea with 2 peppermints qAM (doesn't like coffee).  Only eats when hungry. Has lunch at work and light dinner.  Loves raw veggies and spinach salads, but doesn't eat often. Used to juice with beets, kale, spinach, carrots, apple, but someone told him it was high in sugar.  Trying to cut out fried foods. Drinks Red Bull 1x per month. Likes sweet tea, but does not drink often. Doesn't eat \"sugary stuff.\" Water \"unlimited\" Yeti 2-3 per day.  Metamucil 1x/week. No sugar in the house. Eats a lot of fruit, mostly for breakfast. Drinks protein shakes-- used protein powder-- but stopped due to constipation. Will drink a boost every once in awhile if wants something sweet. Instead of eating snacks from the vending machine, he is eating jaramillo's double noodle soup (whole can= 42.5 g carbs) for a snack. He also eats baby food (fruit).  Recommended pt switch to regular chx noodle soup (whole can = 20g carb) and add protein powder to his baby food. Protein veggie shake every  Excision Method: Elliptical

## 2025-04-29 ENCOUNTER — RESULTS FOLLOW-UP (OUTPATIENT)
Dept: PRIMARY CARE CLINIC | Age: 60
End: 2025-04-29

## 2025-04-29 DIAGNOSIS — G47.33 OSA (OBSTRUCTIVE SLEEP APNEA): Primary | ICD-10-CM

## 2025-04-29 LAB
25(OH)D3 SERPL-MCNC: 52 NG/ML
EST. AVERAGE GLUCOSE BLD GHB EST-MCNC: 116.9 MG/DL
HBA1C MFR BLD: 5.7 %

## 2025-04-29 NOTE — TELEPHONE ENCOUNTER
Called and reviewed labs with pt. Vit D and A1c are excellent/stable.  He agreed to call to schedule sleep study, or at least check cost with insurance.     Radha Hopson, PharmD, BCACP  [facility]   [facility] Jarett Ph: [phone]  [facility] Kate Ph: [phone]  4/29/2025 3:25 PM

## 2025-04-30 DIAGNOSIS — I10 ESSENTIAL HYPERTENSION: ICD-10-CM

## 2025-04-30 RX ORDER — LOSARTAN POTASSIUM 100 MG/1
100 TABLET ORAL DAILY
Qty: 90 TABLET | Refills: 1 | Status: SHIPPED | OUTPATIENT
Start: 2025-04-30

## 2025-05-22 DIAGNOSIS — E11.9 TYPE 2 DIABETES MELLITUS WITHOUT COMPLICATION, WITHOUT LONG-TERM CURRENT USE OF INSULIN (HCC): ICD-10-CM

## 2025-05-22 RX ORDER — BLOOD SUGAR DIAGNOSTIC
STRIP MISCELLANEOUS
Qty: 200 STRIP | Refills: 0 | Status: SHIPPED | OUTPATIENT
Start: 2025-05-22

## 2025-06-26 RX ORDER — AMLODIPINE BESYLATE 10 MG/1
10 TABLET ORAL DAILY
Qty: 90 TABLET | Refills: 1 | Status: SHIPPED | OUTPATIENT
Start: 2025-06-26

## 2025-07-25 ENCOUNTER — OFFICE VISIT (OUTPATIENT)
Dept: PRIMARY CARE CLINIC | Age: 60
End: 2025-07-25
Payer: COMMERCIAL

## 2025-07-25 VITALS
DIASTOLIC BLOOD PRESSURE: 68 MMHG | HEART RATE: 91 BPM | SYSTOLIC BLOOD PRESSURE: 110 MMHG | OXYGEN SATURATION: 98 % | WEIGHT: 224.6 LBS | BODY MASS INDEX: 35.18 KG/M2

## 2025-07-25 DIAGNOSIS — Z12.5 SCREENING FOR PROSTATE CANCER: ICD-10-CM

## 2025-07-25 DIAGNOSIS — Z12.11 SCREENING FOR COLON CANCER: ICD-10-CM

## 2025-07-25 DIAGNOSIS — E11.9 TYPE 2 DIABETES MELLITUS WITHOUT COMPLICATION, WITHOUT LONG-TERM CURRENT USE OF INSULIN (HCC): ICD-10-CM

## 2025-07-25 DIAGNOSIS — N52.9 ERECTILE DYSFUNCTION, UNSPECIFIED ERECTILE DYSFUNCTION TYPE: ICD-10-CM

## 2025-07-25 DIAGNOSIS — Z00.00 ROUTINE PHYSICAL EXAMINATION: Primary | ICD-10-CM

## 2025-07-25 DIAGNOSIS — E66.01 MORBID (SEVERE) OBESITY DUE TO EXCESS CALORIES (HCC): ICD-10-CM

## 2025-07-25 PROCEDURE — 3074F SYST BP LT 130 MM HG: CPT | Performed by: FAMILY MEDICINE

## 2025-07-25 PROCEDURE — 99396 PREV VISIT EST AGE 40-64: CPT | Performed by: FAMILY MEDICINE

## 2025-07-25 PROCEDURE — 3078F DIAST BP <80 MM HG: CPT | Performed by: FAMILY MEDICINE

## 2025-07-25 RX ORDER — SILDENAFIL 100 MG/1
100 TABLET, FILM COATED ORAL DAILY PRN
Qty: 10 TABLET | Refills: 3 | Status: SHIPPED | OUTPATIENT
Start: 2025-07-25

## 2025-07-25 NOTE — PROGRESS NOTES
[facility]  [facility]  [address] [place] [id number]  Dept: [phone]  Dept Fax: [phone]     7/25/2025      Chandler Beltre   1965     Chief Complaint   Patient presents with    Annual Exam       HPI    Pt comes in today for annual physical.     No acute concerns.     No colonoscopy on file   Date of last Cologuard: 5/9/2022  No FIT/FOBT on file   No flexible sigmoidoscopy on file     DMII - A1c 5.7%. Working on weight loss efforts.     Wt Readings from Last 5 Encounters:   07/25/25 101.9 kg (224 lb 9.6 oz)   04/28/25 106.1 kg (234 lb)   01/17/25 104.2 kg (229 lb 12.8 oz)   07/19/24 107.5 kg (237 lb)   01/19/24 114.6 kg (252 lb 9.6 oz)      BP Readings from Last 5 Encounters:   07/25/25 110/68   04/28/25 (!) 144/78   01/17/25 123/69   07/19/24 133/72   01/19/24 133/78       Is in a new relationship. Has a longstanding h/o ED. Would like to start back on viagra. Previously saw urology.     No data recorded     Prior to Visit Medications    Medication Sig Taking? Authorizing Provider   amLODIPine (NORVASC) 10 MG tablet TAKE 1 TABLET BY MOUTH EVERY DAY  Tiffany García DO   blood glucose test strips (ONETOUCH VERIO) strip USE TO CHECK BLOOD SUGAR TWICE DAILY BEFORE MEALS OR AS NEEDED  Tiffany García DO   losartan (COZAAR) 100 MG tablet TAKE 1 TABLET BY MOUTH EVERY DAY  Tiffany García DO   Tirzepatide (MOUNJARO) 10 MG/0.5ML SOAJ pen Inject 10 mg into the skin once a week  Tiffany García DO   atorvastatin (LIPITOR) 80 MG tablet TAKE 1 TABLET BY MOUTH DAILY  Tiffany García DO   dapagliflozin (FARXIGA) 5 MG tablet Take 1 tablet by mouth every morning  Tiffany García DO   Lancets MISC Use to check blood sugar BID before meals or as needed.  Ricky, Tiffany Emily, DO   FLUoxetine (PROZAC) 10 MG capsule Take 1 capsule by mouth daily  Patient not taking: Reported on 4/28/2025  Ricky

## 2025-07-29 ASSESSMENT — ENCOUNTER SYMPTOMS
ABDOMINAL PAIN: 0
COUGH: 0
DIARRHEA: 0
VOMITING: 0
SHORTNESS OF BREATH: 0
NAUSEA: 0

## 2025-08-08 DIAGNOSIS — Z12.5 SCREENING FOR PROSTATE CANCER: ICD-10-CM

## 2025-08-08 DIAGNOSIS — E11.9 TYPE 2 DIABETES MELLITUS WITHOUT COMPLICATION, WITHOUT LONG-TERM CURRENT USE OF INSULIN (HCC): ICD-10-CM

## 2025-08-08 LAB
ALBUMIN SERPL-MCNC: 4.2 G/DL (ref 3.4–5)
ALBUMIN/GLOB SERPL: 1.4 {RATIO} (ref 1.1–2.2)
ALP SERPL-CCNC: 109 U/L (ref 40–129)
ALT SERPL-CCNC: 17 U/L (ref 10–40)
ANION GAP SERPL CALCULATED.3IONS-SCNC: 11 MMOL/L (ref 3–16)
AST SERPL-CCNC: 19 U/L (ref 15–37)
BASOPHILS # BLD: 0 K/UL (ref 0–0.2)
BASOPHILS NFR BLD: 0 %
BILIRUB SERPL-MCNC: 0.5 MG/DL (ref 0–1)
BUN SERPL-MCNC: 12 MG/DL (ref 7–20)
CALCIUM SERPL-MCNC: 9.3 MG/DL (ref 8.3–10.6)
CHLORIDE SERPL-SCNC: 102 MMOL/L (ref 99–110)
CHOLEST SERPL-MCNC: 126 MG/DL (ref 0–199)
CO2 SERPL-SCNC: 25 MMOL/L (ref 21–32)
CREAT SERPL-MCNC: 1 MG/DL (ref 0.8–1.3)
CREAT UR-MCNC: 171 MG/DL (ref 39–259)
DEPRECATED RDW RBC AUTO: 15.7 % (ref 12.4–15.4)
EOSINOPHIL # BLD: 0.2 K/UL (ref 0–0.6)
EOSINOPHIL NFR BLD: 2 %
EST. AVERAGE GLUCOSE BLD GHB EST-MCNC: 111.2 MG/DL
GFR SERPLBLD CREATININE-BSD FMLA CKD-EPI: 86 ML/MIN/{1.73_M2}
GLUCOSE SERPL-MCNC: 92 MG/DL (ref 70–99)
HBA1C MFR BLD: 5.5 %
HCT VFR BLD AUTO: 43 % (ref 40.5–52.5)
HDLC SERPL-MCNC: 48 MG/DL (ref 40–60)
HGB BLD-MCNC: 14.2 G/DL (ref 13.5–17.5)
LDL CHOLESTEROL: 69 MG/DL
LYMPHOCYTES # BLD: 2.2 K/UL (ref 1–5.1)
LYMPHOCYTES NFR BLD: 20 %
MCH RBC QN AUTO: 27.1 PG (ref 26–34)
MCHC RBC AUTO-ENTMCNC: 33.2 G/DL (ref 31–36)
MCV RBC AUTO: 81.8 FL (ref 80–100)
MICROALBUMIN UR DL<=1MG/L-MCNC: <1.2 MG/DL
MICROALBUMIN/CREAT UR: NORMAL MG/G (ref 0–30)
MONOCYTES # BLD: 0.7 K/UL (ref 0–1.3)
MONOCYTES NFR BLD: 8 %
NEUTROPHILS # BLD: 5.7 K/UL (ref 1.7–7.7)
NEUTROPHILS NFR BLD: 63 %
NEUTS BAND NFR BLD MANUAL: 2 % (ref 0–7)
NEUTS VAC BLD QL SMEAR: PRESENT
PLATELET # BLD AUTO: 257 K/UL (ref 135–450)
PLATELET BLD QL SMEAR: ADEQUATE
PMV BLD AUTO: 9.7 FL (ref 5–10.5)
POTASSIUM SERPL-SCNC: 4.2 MMOL/L (ref 3.5–5.1)
PROT SERPL-MCNC: 7.3 G/DL (ref 6.4–8.2)
PSA SERPL DL<=0.01 NG/ML-MCNC: 0.75 NG/ML (ref 0–4)
RBC # BLD AUTO: 5.25 M/UL (ref 4.2–5.9)
RBC MORPH BLD: NORMAL
SLIDE REVIEW: ABNORMAL
SODIUM SERPL-SCNC: 138 MMOL/L (ref 136–145)
TRIGL SERPL-MCNC: 46 MG/DL (ref 0–150)
VARIANT LYMPHS NFR BLD MANUAL: 5 % (ref 0–6)
VLDLC SERPL CALC-MCNC: 9 MG/DL
WBC # BLD AUTO: 8.8 K/UL (ref 4–11)

## 2025-08-13 LAB — NONINV COLON CA DNA+OCC BLD SCRN STL QL: NEGATIVE

## 2025-08-21 DIAGNOSIS — E11.9 TYPE 2 DIABETES MELLITUS WITHOUT COMPLICATION, WITHOUT LONG-TERM CURRENT USE OF INSULIN (HCC): ICD-10-CM

## 2025-08-22 RX ORDER — BLOOD SUGAR DIAGNOSTIC
STRIP MISCELLANEOUS
Qty: 200 STRIP | Refills: 0 | Status: SHIPPED | OUTPATIENT
Start: 2025-08-22